# Patient Record
Sex: FEMALE | Race: WHITE | NOT HISPANIC OR LATINO
[De-identification: names, ages, dates, MRNs, and addresses within clinical notes are randomized per-mention and may not be internally consistent; named-entity substitution may affect disease eponyms.]

---

## 2017-03-08 ENCOUNTER — APPOINTMENT (OUTPATIENT)
Dept: INTERNAL MEDICINE | Facility: CLINIC | Age: 82
End: 2017-03-08

## 2017-03-08 VITALS
HEART RATE: 65 BPM | BODY MASS INDEX: 29.41 KG/M2 | HEIGHT: 63 IN | WEIGHT: 166 LBS | SYSTOLIC BLOOD PRESSURE: 120 MMHG | DIASTOLIC BLOOD PRESSURE: 80 MMHG

## 2017-03-09 ENCOUNTER — RESULT REVIEW (OUTPATIENT)
Age: 82
End: 2017-03-09

## 2017-03-09 LAB
ALBUMIN SERPL ELPH-MCNC: 4.1 G/DL
ALP BLD-CCNC: 65 U/L
ALT SERPL-CCNC: 12 U/L
ANION GAP SERPL CALC-SCNC: 13 MMOL/L
AST SERPL-CCNC: 16 U/L
BASOPHILS # BLD AUTO: 0.05 K/UL
BASOPHILS NFR BLD AUTO: 0.7 %
BILIRUB SERPL-MCNC: 0.2 MG/DL
BUN SERPL-MCNC: 16 MG/DL
CALCIUM SERPL-MCNC: 9.8 MG/DL
CHLORIDE SERPL-SCNC: 101 MMOL/L
CHOLEST SERPL-MCNC: 195 MG/DL
CHOLEST/HDLC SERPL: 2.6 RATIO
CO2 SERPL-SCNC: 22 MMOL/L
CREAT SERPL-MCNC: 0.64 MG/DL
EOSINOPHIL # BLD AUTO: 0.09 K/UL
EOSINOPHIL NFR BLD AUTO: 1.2 %
ERYTHROCYTE [SEDIMENTATION RATE] IN BLOOD BY WESTERGREN METHOD: 15 MM/HR
GLUCOSE SERPL-MCNC: 96 MG/DL
HBA1C MFR BLD HPLC: 5.6 %
HCT VFR BLD CALC: 41.3 %
HDLC SERPL-MCNC: 75 MG/DL
HGB BLD-MCNC: 14 G/DL
IMM GRANULOCYTES NFR BLD AUTO: 0.9 %
LDLC SERPL CALC-MCNC: 103 MG/DL
LYMPHOCYTES # BLD AUTO: 1.89 K/UL
LYMPHOCYTES NFR BLD AUTO: 25.3 %
MAN DIFF?: NORMAL
MCHC RBC-ENTMCNC: 30.2 PG
MCHC RBC-ENTMCNC: 33.9 GM/DL
MCV RBC AUTO: 89 FL
MONOCYTES # BLD AUTO: 0.54 K/UL
MONOCYTES NFR BLD AUTO: 7.2 %
NEUTROPHILS # BLD AUTO: 4.83 K/UL
NEUTROPHILS NFR BLD AUTO: 64.7 %
PLATELET # BLD AUTO: 346 K/UL
POTASSIUM SERPL-SCNC: 4.7 MMOL/L
PROT SERPL-MCNC: 7 G/DL
RBC # BLD: 4.64 M/UL
RBC # FLD: 13.6 %
SODIUM SERPL-SCNC: 136 MMOL/L
TRIGL SERPL-MCNC: 85 MG/DL
URATE SERPL-MCNC: 4 MG/DL
WBC # FLD AUTO: 7.47 K/UL

## 2017-04-11 ENCOUNTER — RX RENEWAL (OUTPATIENT)
Age: 82
End: 2017-04-11

## 2017-05-18 ENCOUNTER — RX RENEWAL (OUTPATIENT)
Age: 82
End: 2017-05-18

## 2017-07-21 ENCOUNTER — APPOINTMENT (OUTPATIENT)
Dept: INTERNAL MEDICINE | Facility: CLINIC | Age: 82
End: 2017-07-21

## 2017-07-21 ENCOUNTER — OUTPATIENT (OUTPATIENT)
Dept: OUTPATIENT SERVICES | Facility: HOSPITAL | Age: 82
LOS: 1 days | End: 2017-07-21
Payer: MEDICARE

## 2017-07-21 ENCOUNTER — NON-APPOINTMENT (OUTPATIENT)
Age: 82
End: 2017-07-21

## 2017-07-21 VITALS
HEIGHT: 63 IN | SYSTOLIC BLOOD PRESSURE: 126 MMHG | BODY MASS INDEX: 29.59 KG/M2 | DIASTOLIC BLOOD PRESSURE: 78 MMHG | WEIGHT: 167 LBS | HEART RATE: 68 BPM | RESPIRATION RATE: 14 BRPM

## 2017-07-21 DIAGNOSIS — Z01.818 ENCOUNTER FOR OTHER PREPROCEDURAL EXAMINATION: ICD-10-CM

## 2017-07-21 DIAGNOSIS — C44.90 UNSPECIFIED MALIGNANT NEOPLASM OF SKIN, UNSPECIFIED: ICD-10-CM

## 2017-07-21 PROCEDURE — G0463: CPT

## 2017-07-21 PROCEDURE — 71046 X-RAY EXAM CHEST 2 VIEWS: CPT

## 2017-07-21 PROCEDURE — 71020: CPT | Mod: 26

## 2017-07-23 LAB
ALBUMIN SERPL ELPH-MCNC: 4.3 G/DL
ALP BLD-CCNC: 64 U/L
ALT SERPL-CCNC: 18 U/L
ANION GAP SERPL CALC-SCNC: 15 MMOL/L
APTT BLD: 27.7 SEC
AST SERPL-CCNC: 16 U/L
BASOPHILS # BLD AUTO: 0.02 K/UL
BASOPHILS NFR BLD AUTO: 0.4 %
BILIRUB SERPL-MCNC: 0.3 MG/DL
BUN SERPL-MCNC: 20 MG/DL
CALCIUM SERPL-MCNC: 9.3 MG/DL
CHLORIDE SERPL-SCNC: 100 MMOL/L
CHOLEST SERPL-MCNC: 203 MG/DL
CHOLEST/HDLC SERPL: 2.8 RATIO
CO2 SERPL-SCNC: 24 MMOL/L
CREAT SERPL-MCNC: 0.63 MG/DL
EOSINOPHIL # BLD AUTO: 0.09 K/UL
EOSINOPHIL NFR BLD AUTO: 1.9 %
GLUCOSE SERPL-MCNC: 97 MG/DL
HBA1C MFR BLD HPLC: 5.5 %
HCT VFR BLD CALC: 41.9 %
HDLC SERPL-MCNC: 72 MG/DL
HGB BLD-MCNC: 13.7 G/DL
IMM GRANULOCYTES NFR BLD AUTO: 0.6 %
INR PPP: 0.85 RATIO
LDLC SERPL CALC-MCNC: 112 MG/DL
LYMPHOCYTES # BLD AUTO: 1.33 K/UL
LYMPHOCYTES NFR BLD AUTO: 27.5 %
MAN DIFF?: NORMAL
MCHC RBC-ENTMCNC: 29.8 PG
MCHC RBC-ENTMCNC: 32.7 GM/DL
MCV RBC AUTO: 91.1 FL
MONOCYTES # BLD AUTO: 0.3 K/UL
MONOCYTES NFR BLD AUTO: 6.2 %
NEUTROPHILS # BLD AUTO: 3.06 K/UL
NEUTROPHILS NFR BLD AUTO: 63.4 %
PLATELET # BLD AUTO: 308 K/UL
POTASSIUM SERPL-SCNC: 5 MMOL/L
PROT SERPL-MCNC: 7 G/DL
PT BLD: 9.6 SEC
RBC # BLD: 4.6 M/UL
RBC # FLD: 13.9 %
SODIUM SERPL-SCNC: 139 MMOL/L
TRIGL SERPL-MCNC: 93 MG/DL
WBC # FLD AUTO: 4.83 K/UL

## 2017-07-24 ENCOUNTER — APPOINTMENT (OUTPATIENT)
Dept: PULMONOLOGY | Facility: CLINIC | Age: 82
End: 2017-07-24

## 2017-07-24 VITALS
RESPIRATION RATE: 14 BRPM | SYSTOLIC BLOOD PRESSURE: 116 MMHG | DIASTOLIC BLOOD PRESSURE: 80 MMHG | OXYGEN SATURATION: 96 % | HEIGHT: 63 IN | HEART RATE: 66 BPM | BODY MASS INDEX: 29.58 KG/M2

## 2017-07-24 VITALS — WEIGHT: 167 LBS | BODY MASS INDEX: 29.58 KG/M2

## 2017-07-26 RX ORDER — ALBUTEROL SULFATE 90 UG/1
108 (90 BASE) AEROSOL, METERED RESPIRATORY (INHALATION)
Qty: 3 | Refills: 1 | Status: DISCONTINUED | COMMUNITY
Start: 2017-07-24 | End: 2017-07-26

## 2017-10-10 ENCOUNTER — LABORATORY RESULT (OUTPATIENT)
Age: 82
End: 2017-10-10

## 2017-10-10 ENCOUNTER — APPOINTMENT (OUTPATIENT)
Dept: INTERNAL MEDICINE | Facility: CLINIC | Age: 82
End: 2017-10-10
Payer: MEDICARE

## 2017-10-10 VITALS — DIASTOLIC BLOOD PRESSURE: 80 MMHG | HEART RATE: 69 BPM | SYSTOLIC BLOOD PRESSURE: 130 MMHG | TEMPERATURE: 98.7 F

## 2017-10-10 DIAGNOSIS — Z01.818 ENCOUNTER FOR OTHER PREPROCEDURAL EXAMINATION: ICD-10-CM

## 2017-10-10 PROCEDURE — 99214 OFFICE O/P EST MOD 30 MIN: CPT | Mod: 25

## 2017-10-10 PROCEDURE — 36415 COLL VENOUS BLD VENIPUNCTURE: CPT

## 2017-10-11 ENCOUNTER — RESULT REVIEW (OUTPATIENT)
Age: 82
End: 2017-10-11

## 2017-10-11 LAB
ALBUMIN SERPL ELPH-MCNC: 3.9 G/DL
ALP BLD-CCNC: 79 U/L
ALT SERPL-CCNC: 24 U/L
ANION GAP SERPL CALC-SCNC: 12 MMOL/L
APPEARANCE: CLEAR
AST SERPL-CCNC: 19 U/L
BACTERIA: NEGATIVE
BASOPHILS # BLD AUTO: 0.03 K/UL
BASOPHILS NFR BLD AUTO: 0.5 %
BILIRUB SERPL-MCNC: <0.2 MG/DL
BILIRUBIN URINE: NEGATIVE
BLOOD URINE: NEGATIVE
BUN SERPL-MCNC: 15 MG/DL
CALCIUM SERPL-MCNC: 9.8 MG/DL
CHLORIDE SERPL-SCNC: 101 MMOL/L
CO2 SERPL-SCNC: 24 MMOL/L
COLOR: YELLOW
CREAT SERPL-MCNC: 0.76 MG/DL
EOSINOPHIL # BLD AUTO: 0.08 K/UL
EOSINOPHIL NFR BLD AUTO: 1.2 %
GLUCOSE QUALITATIVE U: NEGATIVE MG/DL
GLUCOSE SERPL-MCNC: 92 MG/DL
HCT VFR BLD CALC: 38 %
HGB BLD-MCNC: 12.6 G/DL
HYALINE CASTS: 0 /LPF
IMM GRANULOCYTES NFR BLD AUTO: 0.5 %
KETONES URINE: NEGATIVE
LEUKOCYTE ESTERASE URINE: NEGATIVE
LYMPHOCYTES # BLD AUTO: 1.35 K/UL
LYMPHOCYTES NFR BLD AUTO: 21 %
MAN DIFF?: NORMAL
MCHC RBC-ENTMCNC: 29.6 PG
MCHC RBC-ENTMCNC: 33.2 GM/DL
MCV RBC AUTO: 89.2 FL
MICROSCOPIC-UA: NORMAL
MONOCYTES # BLD AUTO: 0.43 K/UL
MONOCYTES NFR BLD AUTO: 6.7 %
NEUTROPHILS # BLD AUTO: 4.51 K/UL
NEUTROPHILS NFR BLD AUTO: 70.1 %
NITRITE URINE: NEGATIVE
PH URINE: 7
PLATELET # BLD AUTO: 349 K/UL
POTASSIUM SERPL-SCNC: 4.6 MMOL/L
PROT SERPL-MCNC: 6.8 G/DL
PROTEIN URINE: NEGATIVE MG/DL
RBC # BLD: 4.26 M/UL
RBC # FLD: 13.3 %
RED BLOOD CELLS URINE: 1 /HPF
SODIUM SERPL-SCNC: 137 MMOL/L
SPECIFIC GRAVITY URINE: 1.02
SQUAMOUS EPITHELIAL CELLS: 0 /HPF
UROBILINOGEN URINE: NEGATIVE MG/DL
WBC # FLD AUTO: 6.43 K/UL
WHITE BLOOD CELLS URINE: 0 /HPF

## 2017-10-12 LAB — BACTERIA UR CULT: NORMAL

## 2017-10-27 ENCOUNTER — APPOINTMENT (OUTPATIENT)
Dept: INTERNAL MEDICINE | Facility: CLINIC | Age: 82
End: 2017-10-27
Payer: MEDICARE

## 2017-10-27 VITALS
OXYGEN SATURATION: 96 % | HEART RATE: 60 BPM | HEIGHT: 63 IN | BODY MASS INDEX: 30.48 KG/M2 | SYSTOLIC BLOOD PRESSURE: 125 MMHG | WEIGHT: 172 LBS | DIASTOLIC BLOOD PRESSURE: 82 MMHG

## 2017-10-27 PROCEDURE — 99214 OFFICE O/P EST MOD 30 MIN: CPT | Mod: 25

## 2017-10-27 PROCEDURE — 36415 COLL VENOUS BLD VENIPUNCTURE: CPT

## 2017-10-30 ENCOUNTER — RESULT REVIEW (OUTPATIENT)
Age: 82
End: 2017-10-30

## 2017-10-30 LAB
ALBUMIN SERPL ELPH-MCNC: 4.2 G/DL
ALP BLD-CCNC: 67 U/L
ALT SERPL-CCNC: 18 U/L
ANION GAP SERPL CALC-SCNC: 14 MMOL/L
AST SERPL-CCNC: 15 U/L
BASOPHILS # BLD AUTO: 0.03 K/UL
BASOPHILS NFR BLD AUTO: 0.5 %
BILIRUB SERPL-MCNC: 0.3 MG/DL
BUN SERPL-MCNC: 21 MG/DL
CALCIUM SERPL-MCNC: 9.6 MG/DL
CHLORIDE SERPL-SCNC: 98 MMOL/L
CHOLEST SERPL-MCNC: 195 MG/DL
CHOLEST/HDLC SERPL: 3.1 RATIO
CO2 SERPL-SCNC: 26 MMOL/L
CREAT SERPL-MCNC: 0.64 MG/DL
EOSINOPHIL # BLD AUTO: 0.17 K/UL
EOSINOPHIL NFR BLD AUTO: 3.1 %
GLUCOSE SERPL-MCNC: 102 MG/DL
HBA1C MFR BLD HPLC: 5.5 %
HCT VFR BLD CALC: 41.1 %
HDLC SERPL-MCNC: 62 MG/DL
HGB BLD-MCNC: 13.7 G/DL
IMM GRANULOCYTES NFR BLD AUTO: 0.7 %
LDLC SERPL CALC-MCNC: 111 MG/DL
LYMPHOCYTES # BLD AUTO: 1.51 K/UL
LYMPHOCYTES NFR BLD AUTO: 27.5 %
MAN DIFF?: NORMAL
MCHC RBC-ENTMCNC: 30.2 PG
MCHC RBC-ENTMCNC: 33.3 GM/DL
MCV RBC AUTO: 90.5 FL
MONOCYTES # BLD AUTO: 0.49 K/UL
MONOCYTES NFR BLD AUTO: 8.9 %
NEUTROPHILS # BLD AUTO: 3.26 K/UL
NEUTROPHILS NFR BLD AUTO: 59.3 %
PLATELET # BLD AUTO: 327 K/UL
POTASSIUM SERPL-SCNC: 4.6 MMOL/L
PROT SERPL-MCNC: 7 G/DL
RBC # BLD: 4.54 M/UL
RBC # FLD: 13.3 %
SODIUM SERPL-SCNC: 138 MMOL/L
TRIGL SERPL-MCNC: 108 MG/DL
WBC # FLD AUTO: 5.5 K/UL

## 2017-11-06 ENCOUNTER — APPOINTMENT (OUTPATIENT)
Dept: DERMATOLOGY | Facility: CLINIC | Age: 82
End: 2017-11-06
Payer: MEDICARE

## 2017-11-06 PROCEDURE — 99213 OFFICE O/P EST LOW 20 MIN: CPT

## 2018-01-18 ENCOUNTER — APPOINTMENT (OUTPATIENT)
Dept: INTERNAL MEDICINE | Facility: CLINIC | Age: 83
End: 2018-01-18
Payer: MEDICARE

## 2018-01-18 VITALS
HEART RATE: 80 BPM | OXYGEN SATURATION: 97 % | DIASTOLIC BLOOD PRESSURE: 80 MMHG | BODY MASS INDEX: 30.83 KG/M2 | SYSTOLIC BLOOD PRESSURE: 125 MMHG | WEIGHT: 174 LBS | HEIGHT: 63 IN

## 2018-01-18 PROCEDURE — 99214 OFFICE O/P EST MOD 30 MIN: CPT | Mod: 25

## 2018-01-18 PROCEDURE — 36415 COLL VENOUS BLD VENIPUNCTURE: CPT

## 2018-01-19 ENCOUNTER — RESULT REVIEW (OUTPATIENT)
Age: 83
End: 2018-01-19

## 2018-01-19 LAB
ALBUMIN SERPL ELPH-MCNC: 4.2 G/DL
ALP BLD-CCNC: 71 U/L
ALT SERPL-CCNC: 19 U/L
ANION GAP SERPL CALC-SCNC: 16 MMOL/L
AST SERPL-CCNC: 17 U/L
BASOPHILS # BLD AUTO: 0.06 K/UL
BASOPHILS NFR BLD AUTO: 1 %
BILIRUB SERPL-MCNC: 0.3 MG/DL
BUN SERPL-MCNC: 18 MG/DL
CALCIUM SERPL-MCNC: 9.3 MG/DL
CHLORIDE SERPL-SCNC: 95 MMOL/L
CHOLEST SERPL-MCNC: 191 MG/DL
CHOLEST/HDLC SERPL: 3 RATIO
CO2 SERPL-SCNC: 23 MMOL/L
CREAT SERPL-MCNC: 0.74 MG/DL
EOSINOPHIL # BLD AUTO: 0.09 K/UL
EOSINOPHIL NFR BLD AUTO: 1.5 %
GLUCOSE SERPL-MCNC: 97 MG/DL
HBA1C MFR BLD HPLC: 5.6 %
HCT VFR BLD CALC: 42.3 %
HDLC SERPL-MCNC: 63 MG/DL
HGB BLD-MCNC: 13.9 G/DL
IMM GRANULOCYTES NFR BLD AUTO: 0.5 %
LDLC SERPL CALC-MCNC: 102 MG/DL
LYMPHOCYTES # BLD AUTO: 1.48 K/UL
LYMPHOCYTES NFR BLD AUTO: 24.1 %
MAN DIFF?: NORMAL
MCHC RBC-ENTMCNC: 29.3 PG
MCHC RBC-ENTMCNC: 32.9 GM/DL
MCV RBC AUTO: 89.1 FL
MONOCYTES # BLD AUTO: 0.51 K/UL
MONOCYTES NFR BLD AUTO: 8.3 %
NEUTROPHILS # BLD AUTO: 3.97 K/UL
NEUTROPHILS NFR BLD AUTO: 64.6 %
PLATELET # BLD AUTO: 335 K/UL
POTASSIUM SERPL-SCNC: 4.5 MMOL/L
PROT SERPL-MCNC: 7.1 G/DL
RBC # BLD: 4.75 M/UL
RBC # FLD: 13.8 %
SODIUM SERPL-SCNC: 134 MMOL/L
TRIGL SERPL-MCNC: 129 MG/DL
WBC # FLD AUTO: 6.14 K/UL

## 2018-04-18 ENCOUNTER — APPOINTMENT (OUTPATIENT)
Dept: INTERNAL MEDICINE | Facility: CLINIC | Age: 83
End: 2018-04-18
Payer: MEDICARE

## 2018-04-18 VITALS
OXYGEN SATURATION: 97 % | HEART RATE: 62 BPM | HEIGHT: 63 IN | WEIGHT: 170 LBS | DIASTOLIC BLOOD PRESSURE: 80 MMHG | BODY MASS INDEX: 30.12 KG/M2 | SYSTOLIC BLOOD PRESSURE: 130 MMHG

## 2018-04-18 PROCEDURE — 36415 COLL VENOUS BLD VENIPUNCTURE: CPT

## 2018-04-18 PROCEDURE — 99214 OFFICE O/P EST MOD 30 MIN: CPT | Mod: 25

## 2018-04-19 LAB
ALBUMIN SERPL ELPH-MCNC: 4.2 G/DL
ALP BLD-CCNC: 76 U/L
ALT SERPL-CCNC: 18 U/L
ANION GAP SERPL CALC-SCNC: 16 MMOL/L
AST SERPL-CCNC: 16 U/L
BASOPHILS # BLD AUTO: 0.07 K/UL
BASOPHILS NFR BLD AUTO: 1.5 %
BILIRUB SERPL-MCNC: 0.3 MG/DL
BUN SERPL-MCNC: 21 MG/DL
CALCIUM SERPL-MCNC: 9.6 MG/DL
CHLORIDE SERPL-SCNC: 101 MMOL/L
CHOLEST SERPL-MCNC: 193 MG/DL
CHOLEST/HDLC SERPL: 3 RATIO
CO2 SERPL-SCNC: 22 MMOL/L
CREAT SERPL-MCNC: 0.68 MG/DL
EOSINOPHIL # BLD AUTO: 0.17 K/UL
EOSINOPHIL NFR BLD AUTO: 3.6 %
GLUCOSE SERPL-MCNC: 95 MG/DL
HBA1C MFR BLD HPLC: 5.5 %
HCT VFR BLD CALC: 40.6 %
HCV AB SER QL: NONREACTIVE
HCV S/CO RATIO: 0.19 S/CO
HDLC SERPL-MCNC: 65 MG/DL
HGB BLD-MCNC: 13.3 G/DL
IMM GRANULOCYTES NFR BLD AUTO: 0.4 %
LDLC SERPL CALC-MCNC: 110 MG/DL
LYMPHOCYTES # BLD AUTO: 1.54 K/UL
LYMPHOCYTES NFR BLD AUTO: 32.7 %
MAN DIFF?: NORMAL
MCHC RBC-ENTMCNC: 29.2 PG
MCHC RBC-ENTMCNC: 32.8 GM/DL
MCV RBC AUTO: 89.2 FL
MONOCYTES # BLD AUTO: 0.33 K/UL
MONOCYTES NFR BLD AUTO: 7 %
NEUTROPHILS # BLD AUTO: 2.58 K/UL
NEUTROPHILS NFR BLD AUTO: 54.8 %
PLATELET # BLD AUTO: 316 K/UL
POTASSIUM SERPL-SCNC: 4.6 MMOL/L
PROT SERPL-MCNC: 6.8 G/DL
RBC # BLD: 4.55 M/UL
RBC # FLD: 14.4 %
SODIUM SERPL-SCNC: 139 MMOL/L
TRIGL SERPL-MCNC: 88 MG/DL
WBC # FLD AUTO: 4.71 K/UL

## 2018-04-20 ENCOUNTER — RESULT REVIEW (OUTPATIENT)
Age: 83
End: 2018-04-20

## 2018-05-03 ENCOUNTER — RESULT REVIEW (OUTPATIENT)
Age: 83
End: 2018-05-03

## 2018-05-03 DIAGNOSIS — R92.8 OTHER ABNORMAL AND INCONCLUSIVE FINDINGS ON DIAGNOSTIC IMAGING OF BREAST: ICD-10-CM

## 2018-06-08 ENCOUNTER — RX RENEWAL (OUTPATIENT)
Age: 83
End: 2018-06-08

## 2018-07-12 ENCOUNTER — RESULT REVIEW (OUTPATIENT)
Age: 83
End: 2018-07-12

## 2018-07-25 ENCOUNTER — APPOINTMENT (OUTPATIENT)
Dept: PULMONOLOGY | Facility: CLINIC | Age: 83
End: 2018-07-25
Payer: MEDICARE

## 2018-07-25 VITALS
HEIGHT: 63 IN | DIASTOLIC BLOOD PRESSURE: 78 MMHG | SYSTOLIC BLOOD PRESSURE: 140 MMHG | HEART RATE: 68 BPM | BODY MASS INDEX: 29.95 KG/M2 | OXYGEN SATURATION: 95 % | WEIGHT: 169 LBS

## 2018-07-25 PROCEDURE — 94727 GAS DIL/WSHOT DETER LNG VOL: CPT

## 2018-07-25 PROCEDURE — 85018 HEMOGLOBIN: CPT | Mod: QW

## 2018-07-25 PROCEDURE — 99214 OFFICE O/P EST MOD 30 MIN: CPT | Mod: 25

## 2018-07-25 PROCEDURE — 94010 BREATHING CAPACITY TEST: CPT

## 2018-07-25 PROCEDURE — 94729 DIFFUSING CAPACITY: CPT

## 2018-08-02 ENCOUNTER — MEDICATION RENEWAL (OUTPATIENT)
Age: 83
End: 2018-08-02

## 2018-09-04 ENCOUNTER — APPOINTMENT (OUTPATIENT)
Dept: INTERNAL MEDICINE | Facility: CLINIC | Age: 83
End: 2018-09-04
Payer: MEDICARE

## 2018-09-04 VITALS
BODY MASS INDEX: 29.95 KG/M2 | DIASTOLIC BLOOD PRESSURE: 70 MMHG | HEART RATE: 65 BPM | SYSTOLIC BLOOD PRESSURE: 123 MMHG | HEIGHT: 63 IN | WEIGHT: 169 LBS | RESPIRATION RATE: 14 BRPM

## 2018-09-04 DIAGNOSIS — D25.9 LEIOMYOMA OF UTERUS, UNSPECIFIED: ICD-10-CM

## 2018-09-04 DIAGNOSIS — Z11.59 ENCOUNTER FOR SCREENING FOR OTHER VIRAL DISEASES: ICD-10-CM

## 2018-09-04 LAB — CYTOLOGY CVX/VAG DOC THIN PREP: NORMAL

## 2018-09-04 PROCEDURE — 36415 COLL VENOUS BLD VENIPUNCTURE: CPT

## 2018-09-04 PROCEDURE — G0439: CPT

## 2018-09-04 NOTE — HISTORY OF PRESENT ILLNESS
[FreeTextEntry1] : 82-year-old female with history of asthma, mild, nonobstructive CAD, hypertension, hyperlipidemia and osteopenia presents for her yearly physical.\par \par The patient is doing very well without  specific complaints including no chest pain, palpitations, shortness of breath or edema.\par Her asthma is well-controlled and has no use for rescue inhalers.\par The patient does complain of some dyspnea on exertion, and she feels is her asthma, but recently had a pulmonary evaluation who felt her asthma was under good control.\par The patient has a cardiology evaluation next week\par \par The patient was very active with regular walking and frequent traveling, but not as much, so with a chronic left knee issue and her dyspnea on exertion\par \par Patient's only issue is 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 The patient had a left breast biopsy, which was benign. July 2018.\par

## 2018-09-04 NOTE — HEALTH RISK ASSESSMENT
[Good] : ~his/her~  mood as  good [No falls in past year] : Patient reported no falls in the past year [0] : 2) Feeling down, depressed, or hopeless: Not at all (0) [None] : None [Alone] : lives alone [Retired] : retired [] :  [Sexually Active] : sexually active [Feels Safe at Home] : Feels safe at home [Fully functional (bathing, dressing, toileting, transferring, walking, feeding)] : Fully functional (bathing, dressing, toileting, transferring, walking, feeding) [Fully functional (using the telephone, shopping, preparing meals, housekeeping, doing laundry, using] : Fully functional and needs no help or supervision to perform IADLs (using the telephone, shopping, preparing meals, housekeeping, doing laundry, using transportation, managing medications and managing finances) [Smoke Detector] : smoke detector [Carbon Monoxide Detector] : carbon monoxide detector [Seat Belt] :  uses seat belt [Sunscreen] : uses sunscreen [Discussed at today's visit] : Advance Directives Discussed at today's visit [College] : College [# Of Children ___] : has [unfilled] children [Designated Healthcare Proxy] : Designated healthcare proxy [Name: ___] : Health Care Proxy's Name: [unfilled]  [] : No [de-identified] : Occasional [Change in mental status noted] : No change in mental status noted [Reports changes in hearing] : Reports no changes in hearing [Reports changes in vision] : Reports no changes in vision [Reports changes in dental health] : Reports no changes in dental health [Guns at Home] : no guns at home [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

## 2018-09-04 NOTE — PHYSICAL EXAM
[General Appearance - Alert] : alert [General Appearance - In No Acute Distress] : in no acute distress [Sclera] : the sclera and conjunctiva were normal [PERRL With Normal Accommodation] : pupils were equal in size, round, and reactive to light [Extraocular Movements] : extraocular movements were intact [Outer Ear] : the ears and nose were normal in appearance [Oropharynx] : the oropharynx was normal [Neck Appearance] : the appearance of the neck was normal [Neck Cervical Mass (___cm)] : no neck mass was observed [Jugular Venous Distention Increased] : there was no jugular-venous distention [Thyroid Diffuse Enlargement] : the thyroid was not enlarged [Thyroid Nodule] : there were no palpable thyroid nodules [Auscultation Breath Sounds / Voice Sounds] : lungs were clear to auscultation bilaterally [Heart Rate And Rhythm] : heart rate was normal and rhythm regular [Heart Sounds] : normal S1 and S2 [Heart Sounds Gallop] : no gallops [Murmurs] : no murmurs [Heart Sounds Pericardial Friction Rub] : no pericardial rub [Full Pulse] : the pedal pulses are present [Edema] : there was no peripheral edema [Bowel Sounds] : normal bowel sounds [Abdomen Soft] : soft [Abdomen Tenderness] : non-tender [] : no hepato-splenomegaly [Abdomen Mass (___ Cm)] : no abdominal mass palpated [Cervical Lymph Nodes Enlarged Posterior Bilaterally] : posterior cervical [Cervical Lymph Nodes Enlarged Anterior Bilaterally] : anterior cervical [Supraclavicular Lymph Nodes Enlarged Bilaterally] : supraclavicular [Axillary Lymph Nodes Enlarged Bilaterally] : axillary [Femoral Lymph Nodes Enlarged Bilaterally] : femoral [Inguinal Lymph Nodes Enlarged Bilaterally] : inguinal [No CVA Tenderness] : no ~M costovertebral angle tenderness [No Spinal Tenderness] : no spinal tenderness [Abnormal Walk] : normal gait [Nail Clubbing] : no clubbing  or cyanosis of the fingernails [Musculoskeletal - Swelling] : no joint swelling seen [Motor Tone] : muscle strength and tone were normal [Cranial Nerves] : cranial nerves 2-12 were intact [No Focal Deficits] : no focal deficits [Oriented To Time, Place, And Person] : oriented to person, place, and time [Impaired Insight] : insight and judgment were intact [Affect] : the affect was normal [FreeTextEntry1] : multiple nevi. follows with dermatology

## 2018-09-04 NOTE — REVIEW OF SYSTEMS
[Negative] : Heme/Lymph [Dyspnea on Exertion] : dyspnea on exertion [SOB on Exertion] : shortness of breath during exertion [As Noted in HPI] : as noted in HPI [Joint Pain] : joint pain

## 2018-09-04 NOTE — ASSESSMENT
[FreeTextEntry1] : 82-year-old female who is quite active and relatively stable with controlled hypertension, asymptomatic mild coronary artery disease and mild mitral regurgitation.\par \par  Asthma is controlled with her Advair and Singulair and her GERD is controlled without meds.. Patient is to continue present treatment.\par Patient with dyspnea on exertion with good pulmonary evaluation therefore, to followup with cardiology.\par \par Patient with asymptomatic unifocal PVCs with recent cardiac evaluation essentially negative.\par \par Cardiology followup \par Mammography May 2018\par Bone density May 2017\par Colonoscopy 2009 without followup needed unless any issue\par GYN \par \par Followup in 3 months and as needed\par \par

## 2018-09-05 ENCOUNTER — RESULT REVIEW (OUTPATIENT)
Age: 83
End: 2018-09-05

## 2018-09-05 LAB
ALBUMIN SERPL ELPH-MCNC: 4.5 G/DL
ALP BLD-CCNC: 77 U/L
ALT SERPL-CCNC: 15 U/L
ANION GAP SERPL CALC-SCNC: 14 MMOL/L
APPEARANCE: CLEAR
AST SERPL-CCNC: 18 U/L
BACTERIA: NEGATIVE
BASOPHILS # BLD AUTO: 0.07 K/UL
BASOPHILS NFR BLD AUTO: 1.3 %
BILIRUB SERPL-MCNC: 0.2 MG/DL
BILIRUBIN URINE: NEGATIVE
BLOOD URINE: NEGATIVE
BUN SERPL-MCNC: 17 MG/DL
CALCIUM SERPL-MCNC: 9.6 MG/DL
CANCER AG125 SERPL-ACNC: 13 U/ML
CHLORIDE SERPL-SCNC: 100 MMOL/L
CHOLEST SERPL-MCNC: 181 MG/DL
CHOLEST/HDLC SERPL: 3.3 RATIO
CO2 SERPL-SCNC: 24 MMOL/L
COLOR: YELLOW
CREAT SERPL-MCNC: 0.64 MG/DL
EOSINOPHIL # BLD AUTO: 0.15 K/UL
EOSINOPHIL NFR BLD AUTO: 2.8 %
GLUCOSE QUALITATIVE U: NEGATIVE MG/DL
GLUCOSE SERPL-MCNC: 100 MG/DL
HBA1C MFR BLD HPLC: 5.7 %
HCT VFR BLD CALC: 42.6 %
HDLC SERPL-MCNC: 55 MG/DL
HGB BLD-MCNC: 13.8 G/DL
HYALINE CASTS: 3 /LPF
IMM GRANULOCYTES NFR BLD AUTO: 0.6 %
KETONES URINE: NEGATIVE
LDLC SERPL CALC-MCNC: 99 MG/DL
LEUKOCYTE ESTERASE URINE: NEGATIVE
LYMPHOCYTES # BLD AUTO: 1.52 K/UL
LYMPHOCYTES NFR BLD AUTO: 28 %
MAN DIFF?: NORMAL
MCHC RBC-ENTMCNC: 29.4 PG
MCHC RBC-ENTMCNC: 32.4 GM/DL
MCV RBC AUTO: 90.6 FL
MICROSCOPIC-UA: NORMAL
MONOCYTES # BLD AUTO: 0.36 K/UL
MONOCYTES NFR BLD AUTO: 6.6 %
NEUTROPHILS # BLD AUTO: 3.3 K/UL
NEUTROPHILS NFR BLD AUTO: 60.7 %
NITRITE URINE: NEGATIVE
PH URINE: 5.5
PLATELET # BLD AUTO: 351 K/UL
POTASSIUM SERPL-SCNC: 4.6 MMOL/L
PROT SERPL-MCNC: 6.9 G/DL
PROTEIN URINE: NEGATIVE MG/DL
RBC # BLD: 4.7 M/UL
RBC # FLD: 13.7 %
RED BLOOD CELLS URINE: 1 /HPF
SODIUM SERPL-SCNC: 138 MMOL/L
SPECIFIC GRAVITY URINE: 1.02
SQUAMOUS EPITHELIAL CELLS: 2 /HPF
TRIGL SERPL-MCNC: 134 MG/DL
UROBILINOGEN URINE: NEGATIVE MG/DL
WBC # FLD AUTO: 5.43 K/UL
WHITE BLOOD CELLS URINE: 1 /HPF

## 2018-10-03 ENCOUNTER — LABORATORY RESULT (OUTPATIENT)
Age: 83
End: 2018-10-03

## 2018-10-03 ENCOUNTER — APPOINTMENT (OUTPATIENT)
Dept: GASTROENTEROLOGY | Facility: CLINIC | Age: 83
End: 2018-10-03
Payer: MEDICARE

## 2018-10-03 VITALS
RESPIRATION RATE: 16 BRPM | WEIGHT: 169 LBS | HEIGHT: 63 IN | DIASTOLIC BLOOD PRESSURE: 78 MMHG | SYSTOLIC BLOOD PRESSURE: 128 MMHG | HEART RATE: 68 BPM | BODY MASS INDEX: 29.95 KG/M2

## 2018-10-03 PROCEDURE — 99204 OFFICE O/P NEW MOD 45 MIN: CPT

## 2018-10-04 LAB
ALBUMIN SERPL ELPH-MCNC: 4.3 G/DL
ALP BLD-CCNC: 68 U/L
ALT SERPL-CCNC: 14 U/L
ANION GAP SERPL CALC-SCNC: 14 MMOL/L
AST SERPL-CCNC: 15 U/L
BASOPHILS # BLD AUTO: 0.04 K/UL
BASOPHILS NFR BLD AUTO: 0.6 %
BILIRUB SERPL-MCNC: <0.2 MG/DL
BUN SERPL-MCNC: 18 MG/DL
CALCIUM SERPL-MCNC: 9.4 MG/DL
CHLORIDE SERPL-SCNC: 104 MMOL/L
CO2 SERPL-SCNC: 22 MMOL/L
CREAT SERPL-MCNC: 0.58 MG/DL
EOSINOPHIL # BLD AUTO: 0.12 K/UL
EOSINOPHIL NFR BLD AUTO: 1.7 %
GLUCOSE SERPL-MCNC: 100 MG/DL
HCT VFR BLD CALC: 39.6 %
HGB BLD-MCNC: 12.6 G/DL
IMM GRANULOCYTES NFR BLD AUTO: 0.4 %
LYMPHOCYTES # BLD AUTO: 1.65 K/UL
LYMPHOCYTES NFR BLD AUTO: 23.8 %
MAN DIFF?: NORMAL
MCHC RBC-ENTMCNC: 28.5 PG
MCHC RBC-ENTMCNC: 31.8 GM/DL
MCV RBC AUTO: 89.6 FL
MONOCYTES # BLD AUTO: 0.63 K/UL
MONOCYTES NFR BLD AUTO: 9.1 %
NEUTROPHILS # BLD AUTO: 4.45 K/UL
NEUTROPHILS NFR BLD AUTO: 64.4 %
PLATELET # BLD AUTO: 298 K/UL
POTASSIUM SERPL-SCNC: 4.6 MMOL/L
PROT SERPL-MCNC: 6.8 G/DL
RBC # BLD: 4.42 M/UL
RBC # FLD: 14 %
SODIUM SERPL-SCNC: 140 MMOL/L
WBC # FLD AUTO: 6.92 K/UL

## 2018-10-05 LAB
C DIFF TOX GENS STL QL NAA+PROBE: NORMAL
CDIFF BY PCR: NOT DETECTED
GI PCR PANEL, STOOL: NORMAL

## 2018-10-07 LAB — BACTERIA STL CULT: NORMAL

## 2018-10-09 LAB — DEPRECATED O AND P PREP STL: NORMAL

## 2018-10-31 ENCOUNTER — OUTPATIENT (OUTPATIENT)
Dept: OUTPATIENT SERVICES | Facility: HOSPITAL | Age: 83
LOS: 1 days | End: 2018-10-31
Payer: MEDICARE

## 2018-10-31 ENCOUNTER — APPOINTMENT (OUTPATIENT)
Dept: CT IMAGING | Facility: CLINIC | Age: 83
End: 2018-10-31
Payer: MEDICARE

## 2018-10-31 DIAGNOSIS — K52.9 NONINFECTIVE GASTROENTERITIS AND COLITIS, UNSPECIFIED: ICD-10-CM

## 2018-10-31 DIAGNOSIS — R10.9 UNSPECIFIED ABDOMINAL PAIN: ICD-10-CM

## 2018-10-31 PROCEDURE — 74177 CT ABD & PELVIS W/CONTRAST: CPT

## 2018-10-31 PROCEDURE — 74177 CT ABD & PELVIS W/CONTRAST: CPT | Mod: 26

## 2018-10-31 PROCEDURE — 82565 ASSAY OF CREATININE: CPT

## 2018-11-01 ENCOUNTER — RESULT REVIEW (OUTPATIENT)
Age: 83
End: 2018-11-01

## 2018-11-06 ENCOUNTER — RESULT REVIEW (OUTPATIENT)
Age: 83
End: 2018-11-06

## 2018-11-06 ENCOUNTER — APPOINTMENT (OUTPATIENT)
Dept: GASTROENTEROLOGY | Facility: GI CENTER | Age: 83
End: 2018-11-06
Payer: MEDICARE

## 2018-11-06 ENCOUNTER — OUTPATIENT (OUTPATIENT)
Dept: OUTPATIENT SERVICES | Facility: HOSPITAL | Age: 83
LOS: 1 days | End: 2018-11-06
Payer: MEDICARE

## 2018-11-06 VITALS
TEMPERATURE: 98.7 F | DIASTOLIC BLOOD PRESSURE: 85 MMHG | BODY MASS INDEX: 29.95 KG/M2 | OXYGEN SATURATION: 95 % | WEIGHT: 169 LBS | RESPIRATION RATE: 16 BRPM | HEART RATE: 62 BPM | HEIGHT: 63 IN | SYSTOLIC BLOOD PRESSURE: 152 MMHG

## 2018-11-06 DIAGNOSIS — K52.9 NONINFECTIVE GASTROENTERITIS AND COLITIS, UNSPECIFIED: ICD-10-CM

## 2018-11-06 DIAGNOSIS — R10.12 LEFT UPPER QUADRANT PAIN: ICD-10-CM

## 2018-11-06 DIAGNOSIS — K62.1 RECTAL POLYP: ICD-10-CM

## 2018-11-06 PROCEDURE — 45331 SIGMOIDOSCOPY AND BIOPSY: CPT

## 2018-11-06 PROCEDURE — 88305 TISSUE EXAM BY PATHOLOGIST: CPT | Mod: 26

## 2018-11-06 PROCEDURE — 45380 COLONOSCOPY AND BIOPSY: CPT

## 2018-11-08 LAB — SURGICAL PATHOLOGY FINAL REPORT - CH: SIGNIFICANT CHANGE UP

## 2018-11-19 ENCOUNTER — RESULT REVIEW (OUTPATIENT)
Age: 83
End: 2018-11-19

## 2018-11-19 ENCOUNTER — APPOINTMENT (OUTPATIENT)
Dept: DERMATOLOGY | Facility: CLINIC | Age: 83
End: 2018-11-19
Payer: MEDICARE

## 2018-11-19 PROCEDURE — 11100 BX SKIN SUBCUTANEOUS&/MUCOUS MEMBRANE 1 LESION: CPT

## 2018-11-19 PROCEDURE — 99214 OFFICE O/P EST MOD 30 MIN: CPT | Mod: 25

## 2018-12-03 ENCOUNTER — APPOINTMENT (OUTPATIENT)
Dept: INTERNAL MEDICINE | Facility: CLINIC | Age: 83
End: 2018-12-03
Payer: MEDICARE

## 2018-12-03 VITALS
SYSTOLIC BLOOD PRESSURE: 130 MMHG | OXYGEN SATURATION: 96 % | HEART RATE: 74 BPM | BODY MASS INDEX: 29.95 KG/M2 | WEIGHT: 169 LBS | DIASTOLIC BLOOD PRESSURE: 80 MMHG | HEIGHT: 63 IN

## 2018-12-03 PROCEDURE — 36415 COLL VENOUS BLD VENIPUNCTURE: CPT

## 2018-12-03 PROCEDURE — 99214 OFFICE O/P EST MOD 30 MIN: CPT | Mod: 25

## 2018-12-03 NOTE — ASSESSMENT
[FreeTextEntry1] : Labs will be sent out and further recommendations will be made based on lab results. Patient advised to continue present medications with diet/exercise and specialist followup. Patient will return to the office in 3-4 months\par \par \par Shingrix d/w pt\par Mammogram-4/2018\par bone density is up-to-date by GYN-to call for report=5/2017\par Colonoscopy was 2009, no need for follow up\par Sigmoidoscopy 11/2018\par Currently up to date with her specialists which include..\par 1. cardiology-Dr. Arana (stress test 10/2018)\par 2. pulmonary-Dr. Andrea\par 3. dermatology-Dr. Rosas\par 4. gynecology-Dr. Vaca\par 5. ophthalmology-\par 6..podiatry prn-Dr. Thomas\par 7. Orthopedic-Dr. Moreno\par 8.GI-Dr. Cobb\par HIV screening=declines\par hepatitis C screening=4/2018

## 2018-12-03 NOTE — HISTORY OF PRESENT ILLNESS
[FreeTextEntry1] : Pt presents for followup on hypertension/hyperlipidemia/coronary artery disease. Patient is currently fasting for today's labs/no complaints. Patient is currently on lisinopril/isradipine for her hypertension, on fish oil for her hyperlipidemia and metoprolol/ASA for coronary artery disease.\par -saw GI, new DX of Crohn's -now on budesonide\par -To have basal cell removed via Mohs procedure

## 2018-12-04 ENCOUNTER — RESULT REVIEW (OUTPATIENT)
Age: 83
End: 2018-12-04

## 2018-12-04 LAB
ALBUMIN SERPL ELPH-MCNC: 4 G/DL
ALP BLD-CCNC: 79 U/L
ALT SERPL-CCNC: 24 U/L
ANION GAP SERPL CALC-SCNC: 10 MMOL/L
AST SERPL-CCNC: 19 U/L
BASOPHILS # BLD AUTO: 0.03 K/UL
BASOPHILS NFR BLD AUTO: 0.4 %
BILIRUB SERPL-MCNC: 0.2 MG/DL
BUN SERPL-MCNC: 21 MG/DL
CALCIUM SERPL-MCNC: 9.3 MG/DL
CHLORIDE SERPL-SCNC: 103 MMOL/L
CHOLEST SERPL-MCNC: 181 MG/DL
CHOLEST/HDLC SERPL: 2.8 RATIO
CO2 SERPL-SCNC: 24 MMOL/L
CREAT SERPL-MCNC: 0.65 MG/DL
EOSINOPHIL # BLD AUTO: 0.13 K/UL
EOSINOPHIL NFR BLD AUTO: 1.9 %
GLUCOSE SERPL-MCNC: 104 MG/DL
HBA1C MFR BLD HPLC: 5.4 %
HCT VFR BLD CALC: 41.1 %
HDLC SERPL-MCNC: 64 MG/DL
HGB BLD-MCNC: 13.1 G/DL
IMM GRANULOCYTES NFR BLD AUTO: 0.7 %
LDLC SERPL CALC-MCNC: 102 MG/DL
LYMPHOCYTES # BLD AUTO: 1.62 K/UL
LYMPHOCYTES NFR BLD AUTO: 24.2 %
MAN DIFF?: NORMAL
MCHC RBC-ENTMCNC: 29.2 PG
MCHC RBC-ENTMCNC: 31.9 GM/DL
MCV RBC AUTO: 91.7 FL
MONOCYTES # BLD AUTO: 0.53 K/UL
MONOCYTES NFR BLD AUTO: 7.9 %
NEUTROPHILS # BLD AUTO: 4.34 K/UL
NEUTROPHILS NFR BLD AUTO: 64.9 %
PLATELET # BLD AUTO: 355 K/UL
POTASSIUM SERPL-SCNC: 4.6 MMOL/L
PROT SERPL-MCNC: 6.5 G/DL
RBC # BLD: 4.48 M/UL
RBC # FLD: 14.2 %
SODIUM SERPL-SCNC: 137 MMOL/L
TRIGL SERPL-MCNC: 77 MG/DL
WBC # FLD AUTO: 6.7 K/UL

## 2018-12-07 ENCOUNTER — RX RENEWAL (OUTPATIENT)
Age: 83
End: 2018-12-07

## 2018-12-20 ENCOUNTER — APPOINTMENT (OUTPATIENT)
Dept: GASTROENTEROLOGY | Facility: CLINIC | Age: 83
End: 2018-12-20
Payer: MEDICARE

## 2018-12-20 VITALS
RESPIRATION RATE: 16 BRPM | BODY MASS INDEX: 29.23 KG/M2 | OXYGEN SATURATION: 98 % | HEART RATE: 76 BPM | DIASTOLIC BLOOD PRESSURE: 78 MMHG | HEIGHT: 63 IN | SYSTOLIC BLOOD PRESSURE: 130 MMHG | WEIGHT: 165 LBS

## 2018-12-20 PROCEDURE — 99214 OFFICE O/P EST MOD 30 MIN: CPT

## 2018-12-20 RX ORDER — HYOSCYAMINE SULFATE 0.38 MG/1
0.38 TABLET, EXTENDED RELEASE ORAL
Qty: 60 | Refills: 1 | Status: DISCONTINUED | COMMUNITY
Start: 2018-10-03 | End: 2018-12-20

## 2019-03-18 ENCOUNTER — APPOINTMENT (OUTPATIENT)
Dept: INTERNAL MEDICINE | Facility: CLINIC | Age: 84
End: 2019-03-18
Payer: MEDICARE

## 2019-03-18 VITALS
WEIGHT: 161 LBS | RESPIRATION RATE: 14 BRPM | BODY MASS INDEX: 28.53 KG/M2 | DIASTOLIC BLOOD PRESSURE: 80 MMHG | SYSTOLIC BLOOD PRESSURE: 125 MMHG | HEIGHT: 63 IN | HEART RATE: 70 BPM

## 2019-03-18 PROCEDURE — 99214 OFFICE O/P EST MOD 30 MIN: CPT | Mod: 25

## 2019-03-18 PROCEDURE — 36415 COLL VENOUS BLD VENIPUNCTURE: CPT

## 2019-03-18 NOTE — PHYSICAL EXAM
[General Appearance - In No Acute Distress] : in no acute distress [] : no respiratory distress [Respiration, Rhythm And Depth] : normal respiratory rhythm and effort [Auscultation Breath Sounds / Voice Sounds] : lungs were clear to auscultation bilaterally [Heart Rate And Rhythm] : heart rate was normal and rhythm regular [Affect] : the affect was normal [Mood] : the mood was normal [de-identified] : Healed laceration left nares and abrasion bridge of nose

## 2019-03-18 NOTE — HISTORY OF PRESENT ILLNESS
[FreeTextEntry1] : 83-year-old female presents for followup with history of hypertension, hyperlipidemia and mild CAD.\par \par She also was recently diagnosed with colitis likely Crohn's and is being treated with budesonide with benefit. 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

## 2019-03-18 NOTE — ASSESSMENT
[FreeTextEntry1] : 83-year-old female currently medically stable with controlled hypertension and no evidence of cardiac decompensation.\par \par Asthma is controlled.\par \par Blood work done to assessment embolic status.\par \par Shingrix d/w pt\par Mammogram-4/2018\par bone density is up-to-date by GYN-to call for report=5/2017\par Colonoscopy was 2009, no need for follow up\par Sigmoidoscopy 11/2018\par Currently up to date with her specialists which include..\par 1. cardiology-Dr. Arana (stress test 10/2018)\par 2. pulmonary-Dr. Andrea\par 3. dermatology-Dr. Rosas\par 4. gynecology-Dr. Vaca\par 5. ophthalmology-\par 6..podiatry prn-Dr. Thomas\par 7. Orthopedic-Dr. Moreno\par 8.GI-Dr. Cobb\par HIV screening=declines\par hepatitis C screening=4/2018\par \par THE PATIENT IS CURRENTLY MEDICALLY STABLE WITHOUT CONTRAINDICATION TO SCHEDULED LOW RISK PROCEDURE

## 2019-03-20 ENCOUNTER — RESULT REVIEW (OUTPATIENT)
Age: 84
End: 2019-03-20

## 2019-03-20 LAB
ALBUMIN SERPL ELPH-MCNC: 4.4 G/DL
ALP BLD-CCNC: 83 U/L
ALT SERPL-CCNC: 18 U/L
ANION GAP SERPL CALC-SCNC: 16 MMOL/L
AST SERPL-CCNC: 22 U/L
BASOPHILS # BLD AUTO: 0.06 K/UL
BASOPHILS NFR BLD AUTO: 1 %
BILIRUB SERPL-MCNC: <0.2 MG/DL
BUN SERPL-MCNC: 23 MG/DL
CALCIUM SERPL-MCNC: 9.5 MG/DL
CHLORIDE SERPL-SCNC: 99 MMOL/L
CHOLEST SERPL-MCNC: 200 MG/DL
CHOLEST/HDLC SERPL: 3.1 RATIO
CO2 SERPL-SCNC: 21 MMOL/L
CREAT SERPL-MCNC: 0.74 MG/DL
EOSINOPHIL # BLD AUTO: 0.16 K/UL
EOSINOPHIL NFR BLD AUTO: 2.7 %
GLUCOSE SERPL-MCNC: 100 MG/DL
HBA1C MFR BLD HPLC: 5.6 %
HCT VFR BLD CALC: 44.2 %
HDLC SERPL-MCNC: 64 MG/DL
HGB BLD-MCNC: 13.8 G/DL
IMM GRANULOCYTES NFR BLD AUTO: 0.5 %
LDLC SERPL CALC-MCNC: 111 MG/DL
LYMPHOCYTES # BLD AUTO: 1.37 K/UL
LYMPHOCYTES NFR BLD AUTO: 23.3 %
MAN DIFF?: NORMAL
MCHC RBC-ENTMCNC: 29.4 PG
MCHC RBC-ENTMCNC: 31.2 GM/DL
MCV RBC AUTO: 94 FL
MONOCYTES # BLD AUTO: 0.43 K/UL
MONOCYTES NFR BLD AUTO: 7.3 %
NEUTROPHILS # BLD AUTO: 3.83 K/UL
NEUTROPHILS NFR BLD AUTO: 65.2 %
PLATELET # BLD AUTO: 375 K/UL
POTASSIUM SERPL-SCNC: 5 MMOL/L
PROT SERPL-MCNC: 7.1 G/DL
RBC # BLD: 4.7 M/UL
RBC # FLD: 14.3 %
SODIUM SERPL-SCNC: 136 MMOL/L
TRIGL SERPL-MCNC: 124 MG/DL
WBC # FLD AUTO: 5.88 K/UL

## 2019-04-01 ENCOUNTER — APPOINTMENT (OUTPATIENT)
Dept: GASTROENTEROLOGY | Facility: CLINIC | Age: 84
End: 2019-04-01
Payer: MEDICARE

## 2019-04-01 VITALS
SYSTOLIC BLOOD PRESSURE: 146 MMHG | BODY MASS INDEX: 29.23 KG/M2 | RESPIRATION RATE: 15 BRPM | OXYGEN SATURATION: 98 % | HEIGHT: 63 IN | HEART RATE: 67 BPM | DIASTOLIC BLOOD PRESSURE: 94 MMHG | WEIGHT: 165 LBS

## 2019-04-01 PROCEDURE — 99214 OFFICE O/P EST MOD 30 MIN: CPT

## 2019-04-01 NOTE — ASSESSMENT
[FreeTextEntry1] : I recommended to taper off Budesonide. If she has any recurrence of the symptoms, we will start budesonide again and start her on mesalamine. We will follow up in 2 months.

## 2019-04-01 NOTE — HISTORY OF PRESENT ILLNESS
[de-identified] : The patient arrived for followup. She underwent sigmoidoscopy for intractable abdominal discomfort and diarrhea.Stool workup including stool studies, small intestinal bacterial overgrowth test was negative. 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 She underwent flexible sigmoidoscopy without sedation and the biopsies were suggestive of colitis. She had diverticulosis and Rectal polyp. The rectal polyp was inflammatory. The patient was started on budesonide.She was started on 3 mg budesonide 3 times a day. Her symptoms immediately improved. Now she is on tapering dose of budesonide. She has low grade symptoms of abdominal bloating, gassiness today.

## 2019-04-15 ENCOUNTER — RX RENEWAL (OUTPATIENT)
Age: 84
End: 2019-04-15

## 2019-05-21 ENCOUNTER — MEDICATION RENEWAL (OUTPATIENT)
Age: 84
End: 2019-05-21

## 2019-05-24 ENCOUNTER — RX RENEWAL (OUTPATIENT)
Age: 84
End: 2019-05-24

## 2019-05-28 ENCOUNTER — APPOINTMENT (OUTPATIENT)
Dept: GASTROENTEROLOGY | Facility: CLINIC | Age: 84
End: 2019-05-28
Payer: MEDICARE

## 2019-05-28 VITALS
DIASTOLIC BLOOD PRESSURE: 100 MMHG | SYSTOLIC BLOOD PRESSURE: 150 MMHG | WEIGHT: 164 LBS | OXYGEN SATURATION: 98 % | BODY MASS INDEX: 29.06 KG/M2 | HEIGHT: 63 IN | RESPIRATION RATE: 16 BRPM | HEART RATE: 65 BPM

## 2019-05-28 PROCEDURE — 99214 OFFICE O/P EST MOD 30 MIN: CPT

## 2019-05-28 NOTE — PHYSICAL EXAM
[General Appearance - Alert] : alert [Sclera] : the sclera and conjunctiva were normal [General Appearance - In No Acute Distress] : in no acute distress [PERRL With Normal Accommodation] : pupils were equal in size, round, and reactive to light [Extraocular Movements] : extraocular movements were intact [Outer Ear] : the ears and nose were normal in appearance [Neck Appearance] : the appearance of the neck was normal [Oropharynx] : the oropharynx was normal [Thyroid Diffuse Enlargement] : the thyroid was not enlarged [Neck Cervical Mass (___cm)] : no neck mass was observed [Jugular Venous Distention Increased] : there was no jugular-venous distention [Thyroid Nodule] : there were no palpable thyroid nodules [Auscultation Breath Sounds / Voice Sounds] : lungs were clear to auscultation bilaterally [Heart Sounds] : normal S1 and S2 [Heart Rate And Rhythm] : heart rate was normal and rhythm regular [Heart Sounds Gallop] : no gallops [Heart Sounds Pericardial Friction Rub] : no pericardial rub [Murmurs] : no murmurs [Edema] : there was no peripheral edema [Full Pulse] : the pedal pulses are present [Bowel Sounds] : normal bowel sounds [Abdomen Soft] : soft [Abdomen Mass (___ Cm)] : no abdominal mass palpated [Abdomen Tenderness] : non-tender [Cervical Lymph Nodes Enlarged Anterior Bilaterally] : anterior cervical [Cervical Lymph Nodes Enlarged Posterior Bilaterally] : posterior cervical [Axillary Lymph Nodes Enlarged Bilaterally] : axillary [Supraclavicular Lymph Nodes Enlarged Bilaterally] : supraclavicular [Femoral Lymph Nodes Enlarged Bilaterally] : femoral [No Spinal Tenderness] : no spinal tenderness [No CVA Tenderness] : no ~M costovertebral angle tenderness [Inguinal Lymph Nodes Enlarged Bilaterally] : inguinal [Musculoskeletal - Swelling] : no joint swelling seen [Abnormal Walk] : normal gait [Nail Clubbing] : no clubbing  or cyanosis of the fingernails [Motor Tone] : muscle strength and tone were normal [Skin Color & Pigmentation] : normal skin color and pigmentation [Skin Turgor] : normal skin turgor [] : no rash [No Focal Deficits] : no focal deficits [Impaired Insight] : insight and judgment were intact [Oriented To Time, Place, And Person] : oriented to person, place, and time [Affect] : the affect was normal

## 2019-05-28 NOTE — HISTORY OF PRESENT ILLNESS
[de-identified] : The patient arrived for followup. She underwent sigmoidoscopy for intractable abdominal discomfort and diarrhea.Stool workup including stool studies, small intestinal bacterial overgrowth test was negative. 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 She underwent flexible sigmoidoscopy without sedation and the biopsies were suggestive of colitis. She had diverticulosis and Rectal polyp. The rectal polyp was inflammatory. The patient was started on budesonide.She was started on 3 mg budesonide 3 times a day. Her symptoms immediately improved. \par \par Once she stopped budesonide, her symptoms of abdominal discomfort and diarrhea returned.She was placed back on budesonide 9 mg. Now she is doing better.No rectal bleeding\par

## 2019-05-28 NOTE — ASSESSMENT
[FreeTextEntry1] : At this time I am recommending to start mesalamine in combination to budesonide. We will slowly taper off budesonide. We will followup in 6 weeks

## 2019-06-21 ENCOUNTER — APPOINTMENT (OUTPATIENT)
Dept: INTERNAL MEDICINE | Facility: CLINIC | Age: 84
End: 2019-06-21
Payer: MEDICARE

## 2019-06-21 VITALS
SYSTOLIC BLOOD PRESSURE: 123 MMHG | HEART RATE: 71 BPM | RESPIRATION RATE: 14 BRPM | BODY MASS INDEX: 28.7 KG/M2 | HEIGHT: 63 IN | DIASTOLIC BLOOD PRESSURE: 80 MMHG | WEIGHT: 162 LBS

## 2019-06-21 PROCEDURE — 99213 OFFICE O/P EST LOW 20 MIN: CPT | Mod: 25

## 2019-06-21 PROCEDURE — 36415 COLL VENOUS BLD VENIPUNCTURE: CPT

## 2019-06-21 NOTE — PHYSICAL EXAM
[General Appearance - In No Acute Distress] : in no acute distress [] : no respiratory distress [Respiration, Rhythm And Depth] : normal respiratory rhythm and effort [Auscultation Breath Sounds / Voice Sounds] : lungs were clear to auscultation bilaterally [Heart Rate And Rhythm] : heart rate was normal and rhythm regular [Affect] : the affect was normal [Mood] : the mood was normal [de-identified] : Healed laceration left nares and abrasion bridge of nose

## 2019-06-21 NOTE — ASSESSMENT
[FreeTextEntry1] : 83-year-old female currently medically stable with controlled hypertension and no evidence of cardiac decompensation.\par \par Asthma is controlled.\par \par Patient with relatively new diagnosis of colitis being controlled with budesonide.\par Questionable side effects secondary to Lialda with patient Will discuss with GI.\par \par Blood work done to assessment metabolic status.  done on requests a GYN\par \par Shingrix d/w pt\par Mammogram-4/2018\par bone density is up-to-date by GYN-to call for report=5/2017\par Colonoscopy was 2009, no need for follow up\par Sigmoidoscopy 11/2018\par Currently up to date with her specialists which include..\par 1. cardiology-Dr. Arana (stress test 10/2018)\par 2. pulmonary-Dr. Andrea\par 3. dermatology-Dr. Rosas\par 4. gynecology-Dr. Vaca\par 5. ophthalmology-\par 6..podiatry prn-Dr. Thomas\par 7. Orthopedic-Dr. Moreno\par 8.GI-Dr. Cobb\par HIV screening=declines\par hepatitis C screening=4/2018\par \par

## 2019-06-21 NOTE — HISTORY OF PRESENT ILLNESS
[FreeTextEntry1] : 83-year-old female presents for followup with history of hypertension, hyperlipidemia and mild CAD.\par \par She also was recently diagnosed with colitis likely Crohn's and is being treated with budesonide with benefit. \par A trial of off he doesn't know was done with immediate return of symptoms. Therefore the patient continues on budesonide and Lialda was added. Since this admission the patient feels she's getting side effects therefore to discuss with GI.\par \par The patient also has asthma which has been controlledr\par \par Overall she is feeling well without complaints including no chest pain, palpitations, shortness of breath or edema.\par \par The patient did trip and fall while in [place] on March 4 with facial trauma including a nasal laceration and trauma to the bridge of her nose.No falls since\par

## 2019-06-22 LAB
ALBUMIN SERPL ELPH-MCNC: 4.3 G/DL
ALP BLD-CCNC: 81 U/L
ALT SERPL-CCNC: 15 U/L
ANION GAP SERPL CALC-SCNC: 15 MMOL/L
AST SERPL-CCNC: 16 U/L
BASOPHILS # BLD AUTO: 0.04 K/UL
BASOPHILS NFR BLD AUTO: 0.4 %
BILIRUB SERPL-MCNC: 0.4 MG/DL
BUN SERPL-MCNC: 23 MG/DL
CALCIUM SERPL-MCNC: 9.5 MG/DL
CANCER AG125 SERPL-ACNC: 12 U/ML
CHLORIDE SERPL-SCNC: 100 MMOL/L
CHOLEST SERPL-MCNC: 200 MG/DL
CHOLEST/HDLC SERPL: 2.8 RATIO
CO2 SERPL-SCNC: 20 MMOL/L
CREAT SERPL-MCNC: 0.64 MG/DL
EOSINOPHIL # BLD AUTO: 0.03 K/UL
EOSINOPHIL NFR BLD AUTO: 0.3 %
GLUCOSE SERPL-MCNC: 106 MG/DL
HCT VFR BLD CALC: 42.6 %
HDLC SERPL-MCNC: 72 MG/DL
HGB BLD-MCNC: 14 G/DL
IMM GRANULOCYTES NFR BLD AUTO: 0.6 %
LDLC SERPL CALC-MCNC: 111 MG/DL
LYMPHOCYTES # BLD AUTO: 1.22 K/UL
LYMPHOCYTES NFR BLD AUTO: 13.6 %
MAN DIFF?: NORMAL
MCHC RBC-ENTMCNC: 29.5 PG
MCHC RBC-ENTMCNC: 32.9 GM/DL
MCV RBC AUTO: 89.9 FL
MONOCYTES # BLD AUTO: 0.71 K/UL
MONOCYTES NFR BLD AUTO: 7.9 %
NEUTROPHILS # BLD AUTO: 6.89 K/UL
NEUTROPHILS NFR BLD AUTO: 77.2 %
PLATELET # BLD AUTO: 310 K/UL
POTASSIUM SERPL-SCNC: 5.2 MMOL/L
PROT SERPL-MCNC: 7.1 G/DL
RBC # BLD: 4.74 M/UL
RBC # FLD: 13.7 %
SODIUM SERPL-SCNC: 135 MMOL/L
TRIGL SERPL-MCNC: 85 MG/DL
WBC # FLD AUTO: 8.94 K/UL

## 2019-06-24 ENCOUNTER — RESULT REVIEW (OUTPATIENT)
Age: 84
End: 2019-06-24

## 2019-07-09 ENCOUNTER — APPOINTMENT (OUTPATIENT)
Dept: GASTROENTEROLOGY | Facility: CLINIC | Age: 84
End: 2019-07-09
Payer: MEDICARE

## 2019-07-09 VITALS
HEART RATE: 68 BPM | RESPIRATION RATE: 18 BRPM | DIASTOLIC BLOOD PRESSURE: 70 MMHG | BODY MASS INDEX: 28.35 KG/M2 | OXYGEN SATURATION: 99 % | SYSTOLIC BLOOD PRESSURE: 128 MMHG | WEIGHT: 160 LBS | HEIGHT: 63 IN

## 2019-07-09 PROCEDURE — 99214 OFFICE O/P EST MOD 30 MIN: CPT

## 2019-07-09 NOTE — PHYSICAL EXAM
[General Appearance - In No Acute Distress] : in no acute distress [General Appearance - Alert] : alert [PERRL With Normal Accommodation] : pupils were equal in size, round, and reactive to light [Sclera] : the sclera and conjunctiva were normal [Outer Ear] : the ears and nose were normal in appearance [Extraocular Movements] : extraocular movements were intact [Oropharynx] : the oropharynx was normal [Neck Cervical Mass (___cm)] : no neck mass was observed [Neck Appearance] : the appearance of the neck was normal [Jugular Venous Distention Increased] : there was no jugular-venous distention [Thyroid Diffuse Enlargement] : the thyroid was not enlarged [Auscultation Breath Sounds / Voice Sounds] : lungs were clear to auscultation bilaterally [Thyroid Nodule] : there were no palpable thyroid nodules [Heart Rate And Rhythm] : heart rate was normal and rhythm regular [Heart Sounds] : normal S1 and S2 [Heart Sounds Gallop] : no gallops [Murmurs] : no murmurs [Heart Sounds Pericardial Friction Rub] : no pericardial rub [Full Pulse] : the pedal pulses are present [Edema] : there was no peripheral edema [Abdomen Soft] : soft [Bowel Sounds] : normal bowel sounds [Abdomen Tenderness] : non-tender [Cervical Lymph Nodes Enlarged Posterior Bilaterally] : posterior cervical [Cervical Lymph Nodes Enlarged Anterior Bilaterally] : anterior cervical [Abdomen Mass (___ Cm)] : no abdominal mass palpated [Supraclavicular Lymph Nodes Enlarged Bilaterally] : supraclavicular [Axillary Lymph Nodes Enlarged Bilaterally] : axillary [Femoral Lymph Nodes Enlarged Bilaterally] : femoral [Inguinal Lymph Nodes Enlarged Bilaterally] : inguinal [No CVA Tenderness] : no ~M costovertebral angle tenderness [No Spinal Tenderness] : no spinal tenderness [Nail Clubbing] : no clubbing  or cyanosis of the fingernails [Abnormal Walk] : normal gait [Musculoskeletal - Swelling] : no joint swelling seen [Motor Tone] : muscle strength and tone were normal [Skin Color & Pigmentation] : normal skin color and pigmentation [No Focal Deficits] : no focal deficits [Skin Turgor] : normal skin turgor [] : no rash [Impaired Insight] : insight and judgment were intact [Oriented To Time, Place, And Person] : oriented to person, place, and time [Affect] : the affect was normal

## 2019-07-09 NOTE — ASSESSMENT
[FreeTextEntry1] : I am recommending  to continue budesonide and continue lialda. In 2 weeks, we will decrease budesonide to 2 pills per day and then continue it for 3 weeks and then taper. If there is any flare up, she will call me. FU in 2 months

## 2019-07-09 NOTE — HISTORY OF PRESENT ILLNESS
[de-identified] : The patient arrived for a followup. The patient has left-sided colitis and has tried budesonide. After the tapering off budesonide her symptoms reoccurred. She was restarted on budesonide and lialda was added. She recently had UTI. She is feeling better. She is passing one bowel movement on a regular basis. She is denying any other complaints or rectal bleeding. Her recent labs are okay.

## 2019-07-22 ENCOUNTER — RESULT REVIEW (OUTPATIENT)
Age: 84
End: 2019-07-22

## 2019-07-22 LAB
APPEARANCE: CLEAR
BACTERIA UR CULT: NORMAL
BACTERIA: NEGATIVE
BILIRUBIN URINE: NEGATIVE
BLOOD URINE: NEGATIVE
COLOR: NORMAL
GLUCOSE QUALITATIVE U: NEGATIVE
HYALINE CASTS: 3 /LPF
KETONES URINE: NEGATIVE
LEUKOCYTE ESTERASE URINE: ABNORMAL
MICROSCOPIC-UA: NORMAL
NITRITE URINE: NEGATIVE
PH URINE: 5.5
PROTEIN URINE: NEGATIVE
RED BLOOD CELLS URINE: 1 /HPF
SPECIFIC GRAVITY URINE: 1.01
SQUAMOUS EPITHELIAL CELLS: 5 /HPF
UROBILINOGEN URINE: NORMAL
WHITE BLOOD CELLS URINE: 5 /HPF

## 2019-08-26 ENCOUNTER — MEDICATION RENEWAL (OUTPATIENT)
Age: 84
End: 2019-08-26

## 2019-08-27 ENCOUNTER — APPOINTMENT (OUTPATIENT)
Dept: GASTROENTEROLOGY | Facility: CLINIC | Age: 84
End: 2019-08-27
Payer: MEDICARE

## 2019-08-27 VITALS
WEIGHT: 167 LBS | RESPIRATION RATE: 15 BRPM | DIASTOLIC BLOOD PRESSURE: 86 MMHG | OXYGEN SATURATION: 98 % | HEART RATE: 74 BPM | HEIGHT: 63 IN | BODY MASS INDEX: 29.59 KG/M2 | SYSTOLIC BLOOD PRESSURE: 149 MMHG

## 2019-08-27 PROCEDURE — 99214 OFFICE O/P EST MOD 30 MIN: CPT

## 2019-08-27 NOTE — PHYSICAL EXAM
[General Appearance - Alert] : alert [Sclera] : the sclera and conjunctiva were normal [General Appearance - In No Acute Distress] : in no acute distress [Extraocular Movements] : extraocular movements were intact [PERRL With Normal Accommodation] : pupils were equal in size, round, and reactive to light [Outer Ear] : the ears and nose were normal in appearance [Oropharynx] : the oropharynx was normal [Neck Cervical Mass (___cm)] : no neck mass was observed [Neck Appearance] : the appearance of the neck was normal [Jugular Venous Distention Increased] : there was no jugular-venous distention [Thyroid Nodule] : there were no palpable thyroid nodules [Thyroid Diffuse Enlargement] : the thyroid was not enlarged [Auscultation Breath Sounds / Voice Sounds] : lungs were clear to auscultation bilaterally [Heart Sounds] : normal S1 and S2 [Heart Rate And Rhythm] : heart rate was normal and rhythm regular [Murmurs] : no murmurs [Heart Sounds Gallop] : no gallops [Heart Sounds Pericardial Friction Rub] : no pericardial rub [Full Pulse] : the pedal pulses are present [Edema] : there was no peripheral edema [Bowel Sounds] : normal bowel sounds [Abdomen Tenderness] : non-tender [Abdomen Soft] : soft [Cervical Lymph Nodes Enlarged Posterior Bilaterally] : posterior cervical [Abdomen Mass (___ Cm)] : no abdominal mass palpated [Supraclavicular Lymph Nodes Enlarged Bilaterally] : supraclavicular [Cervical Lymph Nodes Enlarged Anterior Bilaterally] : anterior cervical [Femoral Lymph Nodes Enlarged Bilaterally] : femoral [Axillary Lymph Nodes Enlarged Bilaterally] : axillary [No Spinal Tenderness] : no spinal tenderness [No CVA Tenderness] : no ~M costovertebral angle tenderness [Inguinal Lymph Nodes Enlarged Bilaterally] : inguinal [Nail Clubbing] : no clubbing  or cyanosis of the fingernails [Abnormal Walk] : normal gait [Musculoskeletal - Swelling] : no joint swelling seen [Motor Tone] : muscle strength and tone were normal [Skin Color & Pigmentation] : normal skin color and pigmentation [Skin Turgor] : normal skin turgor [No Focal Deficits] : no focal deficits [] : no rash [Oriented To Time, Place, And Person] : oriented to person, place, and time [Impaired Insight] : insight and judgment were intact [Affect] : the affect was normal

## 2019-08-27 NOTE — ASSESSMENT
[FreeTextEntry1] : I am recommending to add fiber to her diet. I also recommended to decrease the budesonide to one tablet per day and continue with mesalamine on the current dose. I recommended to stop the budesonide in 3 weeks after going to one tablet per day. She knows how to contact me if there are any questions. Followup in 2 months.\par \par Elan Cobb MD\par Gastroenterology \par \par

## 2019-08-27 NOTE — HISTORY OF PRESENT ILLNESS
[de-identified] : The patient arrived for followup. She has colitis. She is on budesonide and mesalamine. She is still on 2 tablets of 3 mg budesonide.She is complaining of frequent loose stools. No abdominal pain or any new GI symptoms.

## 2019-09-12 ENCOUNTER — APPOINTMENT (OUTPATIENT)
Dept: PULMONOLOGY | Facility: CLINIC | Age: 84
End: 2019-09-12
Payer: MEDICARE

## 2019-09-12 VITALS
HEART RATE: 84 BPM | WEIGHT: 163 LBS | OXYGEN SATURATION: 96 % | HEIGHT: 62.5 IN | SYSTOLIC BLOOD PRESSURE: 112 MMHG | DIASTOLIC BLOOD PRESSURE: 76 MMHG | BODY MASS INDEX: 29.25 KG/M2

## 2019-09-12 PROCEDURE — 94727 GAS DIL/WSHOT DETER LNG VOL: CPT

## 2019-09-12 PROCEDURE — 85018 HEMOGLOBIN: CPT | Mod: QW

## 2019-09-12 PROCEDURE — 94729 DIFFUSING CAPACITY: CPT

## 2019-09-12 PROCEDURE — 99214 OFFICE O/P EST MOD 30 MIN: CPT | Mod: 25

## 2019-09-12 PROCEDURE — 94010 BREATHING CAPACITY TEST: CPT

## 2019-09-12 NOTE — REVIEW OF SYSTEMS
[Fever] : no fever [Chills] : no chills [Dry Eyes] : no dryness of the eyes [Eye Irritation] : no ~T irritation of the eyes [Nasal Congestion] : no nasal congestion [Epistaxis] : no nosebleeds [Sinus Problems] : no sinus problems [Postnasal Drip] : no postnasal drip [Sputum] : not coughing up ~M sputum [Cough] : no cough [Hemoptysis] : no hemoptysis [Chest Tightness] : no chest tightness [Dyspnea] : no dyspnea [Pleuritic Pain] : no pleuritic pain [Wheezing] : no wheezing [Hypertension] : ~T hypertension [Chest Discomfort] : no chest discomfort [Dysrhythmia] : no dysrhythmia [Murmurs] : no murmurs were heard [Palpitations] : no palpitations [Hay Fever] : no hay fever [Edema] : ~T edema was not present [Itchy Eyes] : no itching of ~T the eyes [Reflux] : no reflux [Nausea] : no nausea [Vomiting] : no vomiting [Diarrhea] : no diarrhea [Constipation] : no constipation [Abdominal Pain] : no abdominal pain [Dysuria] : no dysuria [Trauma] : no ~T physical trauma [Fracture] : no fracture [Anemia] : no anemia [Headache] : no headache [Dizziness] : no dizziness [Syncope] : no fainting [Numbness] : no numbness [Paralysis] : no paralysis was seen [Seizures] : no seizures [Depression] : no depression [Anxiety] : no anxiety [Diabetes] : no diabetes mellitus [Thyroid Problem] : no thyroid problem [Snoring] : no snoring [Witnessed Apneas] : demonstrated no ~M apnea [Nonrestorative Sleep] : restorative sleep

## 2019-09-12 NOTE — PROCEDURE
[FreeTextEntry1] : PFTs 7/25/18 - essentially normal and were near baseline.\par PFTs 9/12/19 - essentially normal with a mildly reduced diffusion capacity which corrected for alveolar volume; overall her lung function was without change

## 2019-09-12 NOTE — PHYSICAL EXAM
[General Appearance - Well Developed] : well developed [Normal Appearance] : normal appearance [General Appearance - In No Acute Distress] : no acute distress [Normal Conjunctiva] : the conjunctiva exhibited no abnormalities [Low Lying Soft Palate] : low lying soft palate [Enlarged Base of the Tongue] : enlargement of the base of the tongue [III] : III [Neck Appearance] : the appearance of the neck was normal [Heart Rate And Rhythm] : heart rate and rhythm were normal [Heart Sounds] : normal S1 and S2 [Murmurs] : no murmurs present [Edema] : no peripheral edema present [] : no respiratory distress [Respiration, Rhythm And Depth] : normal respiratory rhythm and effort [FreeTextEntry1] : No abnormalities [Abdomen Soft] : soft [Abdomen Tenderness] : non-tender [Abnormal Walk] : normal gait [Nail Clubbing] : no clubbing of the fingernails [Cyanosis, Localized] : no localized cyanosis [No Focal Deficits] : no focal deficits [Oriented To Time, Place, And Person] : oriented to person, place, and time

## 2019-09-12 NOTE — DISCUSSION/SUMMARY
[FreeTextEntry1] : \par #1. Continue Advair 250/50 for mild asthma; attempts at lowering the dose resulted in increased symptoms so will continue current dose\par #2. Montelukast nightly\par #3. Ventolin as needed\par #4. Diet and exercise for weight loss\par #5. SOBOE is likely related to weight and deconditioning\par #6. F/u 6 months\par #7. There is no pulmonary contraindication for the patient's upcoming cardiac w/u, including catheterization if needed\par

## 2019-09-12 NOTE — CONSULT LETTER
[Dear  ___] : Dear  [unfilled], [Consult Letter:] : I had the pleasure of evaluating your patient, [unfilled]. [Please see my note below.] : Please see my note below. [Consult Closing:] : Thank you very much for allowing me to participate in the care of this patient.  If you have any questions, please do not hesitate to contact me. [Sincerely,] : Sincerely, [FreeTextEntry3] : Nikos Andrea MD, FCCP, D. ABSM\par Pulmonary and Sleep Medicine\par [facility] [place]\par  [DrValeria  ___] : Dr. RODRÍGUEZ [DrValeira ___] : Dr. RODRÍGUEZ [Nikos Andrea MD] : Nikos Andrea MD

## 2019-09-12 NOTE — HISTORY OF PRESENT ILLNESS
[FreeTextEntry1] : The patient presents for evaluation for her upcoming catheterization.\par She c/o increased SOBOE despite no change in her lung function so will be undergoing cardiac w/u. [Mild Persistent] : mild persistent [Doing Well] : doing well [Well Controlled] : Well controlled [None] : None [Adherent] : the patient is adherent with ~his/her~ medication regimen [Goals--Doing Well] : the patient is doing well with ~his/her~ asthma goals [PFTs] : pulmonary function tests

## 2019-09-17 ENCOUNTER — APPOINTMENT (OUTPATIENT)
Dept: INTERNAL MEDICINE | Facility: CLINIC | Age: 84
End: 2019-09-17
Payer: MEDICARE

## 2019-09-17 VITALS
RESPIRATION RATE: 16 BRPM | BODY MASS INDEX: 30.5 KG/M2 | DIASTOLIC BLOOD PRESSURE: 70 MMHG | HEIGHT: 62.5 IN | WEIGHT: 170 LBS | HEART RATE: 70 BPM | SYSTOLIC BLOOD PRESSURE: 115 MMHG

## 2019-09-17 DIAGNOSIS — R10.12 LEFT UPPER QUADRANT PAIN: ICD-10-CM

## 2019-09-17 DIAGNOSIS — H81.90 UNSPECIFIED DISORDER OF VESTIBULAR FUNCTION, UNSPECIFIED EAR: ICD-10-CM

## 2019-09-17 DIAGNOSIS — Z87.440 PERSONAL HISTORY OF URINARY (TRACT) INFECTIONS: ICD-10-CM

## 2019-09-17 DIAGNOSIS — Z01.818 ENCOUNTER FOR OTHER PREPROCEDURAL EXAMINATION: ICD-10-CM

## 2019-09-17 DIAGNOSIS — G89.29 LEFT UPPER QUADRANT PAIN: ICD-10-CM

## 2019-09-17 PROCEDURE — 36415 COLL VENOUS BLD VENIPUNCTURE: CPT

## 2019-09-17 PROCEDURE — G0442 ANNUAL ALCOHOL SCREEN 15 MIN: CPT | Mod: 59

## 2019-09-17 PROCEDURE — G0444 DEPRESSION SCREEN ANNUAL: CPT | Mod: 59

## 2019-09-17 PROCEDURE — G0439: CPT

## 2019-09-17 NOTE — COUNSELING
[Healthy eating counseling provided] : healthy eating [Walking] : Walking [None] : None [Needs reinforcement, provided] : Patient needs reinforcement on understanding lifestyle changes and  the steps needed to achieve self management goals and reinforcement was provided [Decrease Portions] : Decrease food portions

## 2019-09-17 NOTE — REVIEW OF SYSTEMS
[Dyspnea on Exertion] : dyspnea on exertion [SOB on Exertion] : shortness of breath during exertion [Joint Pain] : joint pain [Negative] : Endocrine [As Noted in HPI] : as noted in HPI

## 2019-09-17 NOTE — HISTORY OF PRESENT ILLNESS
[FreeTextEntry1] : 83-year-old female with history of asthma, mild, nonobstructive CAD, hypertension, hyperlipidemia and osteopenia presents for her yearly physical.\par history also includes prediabetes and hypertension.\par \par The patient has 2 significant chronic medical issues.\par -Patient with progressive dyspnea on exertion worsening over the past 2-3 years with recent pulmonary evaluation with controlled asthma with pulmonary feeling it is not related to her asthma.\par Patient is in progress with cardiology doing a workup with echo scheduled and likely to have cardiac catheterization.\par The dyspnea on exertion papers for lifestyle to wear she gets dyspneic even with normal daily activities.\par The patient was an avid walker but cannot do this any longer therefore weight gain.\par \par -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 The patient had a left breast biopsy, which was benign. July 2018.\par

## 2019-09-17 NOTE — HEALTH RISK ASSESSMENT
[Good] : ~his/her~ current health as good [No falls in past year] : Patient reported no falls in the past year [0] : 2) Feeling down, depressed, or hopeless: Not at all (0) [None] : None [Alone] : lives alone [College] : College [Retired] : retired [# Of Children ___] : has [unfilled] children [] :  [Sexually Active] : sexually active [Feels Safe at Home] : Feels safe at home [Fully functional (bathing, dressing, toileting, transferring, walking, feeding)] : Fully functional (bathing, dressing, toileting, transferring, walking, feeding) [Fully functional (using the telephone, shopping, preparing meals, housekeeping, doing laundry, using] : Fully functional and needs no help or supervision to perform IADLs (using the telephone, shopping, preparing meals, housekeeping, doing laundry, using transportation, managing medications and managing finances) [Carbon Monoxide Detector] : carbon monoxide detector [Smoke Detector] : smoke detector [Seat Belt] :  uses seat belt [Sunscreen] : uses sunscreen [Discussed at today's visit] : Advance Directives Discussed at today's visit [Designated Healthcare Proxy] : Designated healthcare proxy [Name: ___] : Health Care Proxy's Name: [unfilled]  [Very Good] : ~his/her~  mood as very good [Yes] : Yes [Monthly or less (1 pt)] : Monthly or less (1 point) [Never (0 pts)] : Never (0 points) [1 or 2 (0 pts)] : 1 or 2 (0 points) [No] : In the past 12 months have you used drugs other than those required for medical reasons? 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

## 2019-09-17 NOTE — ASSESSMENT
[FreeTextEntry1] : 83-year-old female who is quite active and relatively stable with controlled hypertension and asthma\par \par The patient with persistent, progressive dyspnea on exertion which has changed her lifestyle which he cannot walk for exercise any longer. Known mild nonobstructive CAD but concern for cardiac etiology therefore cardiac evaluation being done and patient likely will have cardiac catheterization.\par \par Colitis diagnosed one year ago not controlled on mesalamine only.\par \par  Asthma is controlled with her Advair and Singulair and her GERD is controlled without meds.. Patient is to continue present treatment.\par \par Cardiology followup as scheduled\par Mammography May 2019\par Bone density May 2017\par Colonoscopy 2009 without followup needed unless any issue. Sigmoidoscopy May 2018\par GYN \par \par Vaccines up to date including patient already having received influenza vaccine\par Shingrix discussed\par \par Followup in 3 months and as needed\par \par

## 2019-09-17 NOTE — PHYSICAL EXAM
[General Appearance - Alert] : alert [General Appearance - In No Acute Distress] : in no acute distress [PERRL With Normal Accommodation] : pupils were equal in size, round, and reactive to light [Extraocular Movements] : extraocular movements were intact [Sclera] : the sclera and conjunctiva were normal [Oropharynx] : the oropharynx was normal [Outer Ear] : the ears and nose were normal in appearance [Neck Appearance] : the appearance of the neck was normal [Neck Cervical Mass (___cm)] : no neck mass was observed [Thyroid Diffuse Enlargement] : the thyroid was not enlarged [Jugular Venous Distention Increased] : there was no jugular-venous distention [Thyroid Nodule] : there were no palpable thyroid nodules [Auscultation Breath Sounds / Voice Sounds] : lungs were clear to auscultation bilaterally [Heart Rate And Rhythm] : heart rate was normal and rhythm regular [Heart Sounds Gallop] : no gallops [Heart Sounds] : normal S1 and S2 [Heart Sounds Pericardial Friction Rub] : no pericardial rub [Murmurs] : no murmurs [Edema] : there was no peripheral edema [Full Pulse] : the pedal pulses are present [Bowel Sounds] : normal bowel sounds [Abdomen Tenderness] : non-tender [Abdomen Soft] : soft [] : no hepato-splenomegaly [Abdomen Mass (___ Cm)] : no abdominal mass palpated [Cervical Lymph Nodes Enlarged Posterior Bilaterally] : posterior cervical [Cervical Lymph Nodes Enlarged Anterior Bilaterally] : anterior cervical [Femoral Lymph Nodes Enlarged Bilaterally] : femoral [Axillary Lymph Nodes Enlarged Bilaterally] : axillary [Supraclavicular Lymph Nodes Enlarged Bilaterally] : supraclavicular [Inguinal Lymph Nodes Enlarged Bilaterally] : inguinal [No CVA Tenderness] : no ~M costovertebral angle tenderness [No Spinal Tenderness] : no spinal tenderness [Abnormal Walk] : normal gait [Nail Clubbing] : no clubbing  or cyanosis of the fingernails [Musculoskeletal - Swelling] : no joint swelling seen [Motor Tone] : muscle strength and tone were normal [Cranial Nerves] : cranial nerves 2-12 were intact [No Focal Deficits] : no focal deficits [Impaired Insight] : insight and judgment were intact [Oriented To Time, Place, And Person] : oriented to person, place, and time [Affect] : the affect was normal [FreeTextEntry1] : multiple nevi. follows with dermatology

## 2019-09-18 ENCOUNTER — RESULT REVIEW (OUTPATIENT)
Age: 84
End: 2019-09-18

## 2019-09-18 LAB
25(OH)D3 SERPL-MCNC: 56.2 NG/ML
ALBUMIN SERPL ELPH-MCNC: 4.5 G/DL
ALP BLD-CCNC: 71 U/L
ALT SERPL-CCNC: 21 U/L
ANION GAP SERPL CALC-SCNC: 14 MMOL/L
APPEARANCE: CLEAR
AST SERPL-CCNC: 17 U/L
BACTERIA: NEGATIVE
BASOPHILS # BLD AUTO: 0.06 K/UL
BASOPHILS NFR BLD AUTO: 1.1 %
BILIRUB SERPL-MCNC: 0.3 MG/DL
BILIRUBIN URINE: NEGATIVE
BLOOD URINE: NEGATIVE
BUN SERPL-MCNC: 16 MG/DL
CALCIUM SERPL-MCNC: 9.9 MG/DL
CHLORIDE SERPL-SCNC: 101 MMOL/L
CHOLEST SERPL-MCNC: 191 MG/DL
CHOLEST/HDLC SERPL: 3.3 RATIO
CO2 SERPL-SCNC: 22 MMOL/L
COLOR: NORMAL
CREAT SERPL-MCNC: 0.56 MG/DL
EOSINOPHIL # BLD AUTO: 0.16 K/UL
EOSINOPHIL NFR BLD AUTO: 2.8 %
ESTIMATED AVERAGE GLUCOSE: 114 MG/DL
GLUCOSE QUALITATIVE U: NEGATIVE
GLUCOSE SERPL-MCNC: 99 MG/DL
HBA1C MFR BLD HPLC: 5.6 %
HCT VFR BLD CALC: 43.8 %
HDLC SERPL-MCNC: 58 MG/DL
HGB BLD-MCNC: 14 G/DL
HYALINE CASTS: 0 /LPF
IMM GRANULOCYTES NFR BLD AUTO: 0.9 %
KETONES URINE: NEGATIVE
LDLC SERPL CALC-MCNC: 109 MG/DL
LEUKOCYTE ESTERASE URINE: NEGATIVE
LYMPHOCYTES # BLD AUTO: 1.54 K/UL
LYMPHOCYTES NFR BLD AUTO: 27.3 %
MAN DIFF?: NORMAL
MCHC RBC-ENTMCNC: 29.5 PG
MCHC RBC-ENTMCNC: 32 GM/DL
MCV RBC AUTO: 92.2 FL
MICROSCOPIC-UA: NORMAL
MONOCYTES # BLD AUTO: 0.49 K/UL
MONOCYTES NFR BLD AUTO: 8.7 %
NEUTROPHILS # BLD AUTO: 3.35 K/UL
NEUTROPHILS NFR BLD AUTO: 59.2 %
NITRITE URINE: NEGATIVE
PH URINE: 7
PLATELET # BLD AUTO: 333 K/UL
POTASSIUM SERPL-SCNC: 4.9 MMOL/L
PROT SERPL-MCNC: 7 G/DL
PROTEIN URINE: NEGATIVE
RBC # BLD: 4.75 M/UL
RBC # FLD: 14.2 %
RED BLOOD CELLS URINE: 0 /HPF
SODIUM SERPL-SCNC: 137 MMOL/L
SPECIFIC GRAVITY URINE: 1.01
SQUAMOUS EPITHELIAL CELLS: 1 /HPF
TRIGL SERPL-MCNC: 118 MG/DL
UROBILINOGEN URINE: NORMAL
WBC # FLD AUTO: 5.65 K/UL
WHITE BLOOD CELLS URINE: 1 /HPF

## 2019-09-27 ENCOUNTER — TRANSCRIPTION ENCOUNTER (OUTPATIENT)
Age: 84
End: 2019-09-27

## 2019-09-27 ENCOUNTER — OUTPATIENT (OUTPATIENT)
Dept: OUTPATIENT SERVICES | Facility: HOSPITAL | Age: 84
LOS: 1 days | End: 2019-09-27
Payer: MEDICARE

## 2019-09-27 VITALS
WEIGHT: 160.94 LBS | RESPIRATION RATE: 16 BRPM | TEMPERATURE: 98 F | HEIGHT: 63 IN | HEART RATE: 67 BPM | OXYGEN SATURATION: 97 %

## 2019-09-27 VITALS
DIASTOLIC BLOOD PRESSURE: 98 MMHG | RESPIRATION RATE: 16 BRPM | OXYGEN SATURATION: 98 % | SYSTOLIC BLOOD PRESSURE: 141 MMHG | HEART RATE: 75 BPM

## 2019-09-27 DIAGNOSIS — I25.10 ATHEROSCLEROTIC HEART DISEASE OF NATIVE CORONARY ARTERY WITHOUT ANGINA PECTORIS: ICD-10-CM

## 2019-09-27 LAB
ANION GAP SERPL CALC-SCNC: 11 MMOL/L — SIGNIFICANT CHANGE UP (ref 5–17)
APTT BLD: 28.4 SEC — SIGNIFICANT CHANGE UP (ref 27.5–36.3)
BUN SERPL-MCNC: 17 MG/DL — SIGNIFICANT CHANGE UP (ref 8–20)
CALCIUM SERPL-MCNC: 9.7 MG/DL — SIGNIFICANT CHANGE UP (ref 8.6–10.2)
CHLORIDE SERPL-SCNC: 99 MMOL/L — SIGNIFICANT CHANGE UP (ref 98–107)
CO2 SERPL-SCNC: 26 MMOL/L — SIGNIFICANT CHANGE UP (ref 22–29)
CREAT SERPL-MCNC: 0.5 MG/DL — SIGNIFICANT CHANGE UP (ref 0.5–1.3)
GLUCOSE SERPL-MCNC: 106 MG/DL — SIGNIFICANT CHANGE UP (ref 70–115)
HCT VFR BLD CALC: 41.4 % — SIGNIFICANT CHANGE UP (ref 34.5–45)
HGB BLD-MCNC: 13.3 G/DL — SIGNIFICANT CHANGE UP (ref 11.5–15.5)
INR BLD: 0.88 RATIO — SIGNIFICANT CHANGE UP (ref 0.88–1.16)
MAGNESIUM SERPL-MCNC: 2.3 MG/DL — SIGNIFICANT CHANGE UP (ref 1.6–2.6)
MCHC RBC-ENTMCNC: 29 PG — SIGNIFICANT CHANGE UP (ref 27–34)
MCHC RBC-ENTMCNC: 32.1 GM/DL — SIGNIFICANT CHANGE UP (ref 32–36)
MCV RBC AUTO: 90.4 FL — SIGNIFICANT CHANGE UP (ref 80–100)
PLATELET # BLD AUTO: 310 K/UL — SIGNIFICANT CHANGE UP (ref 150–400)
POTASSIUM SERPL-MCNC: 5.2 MMOL/L — SIGNIFICANT CHANGE UP (ref 3.5–5.3)
POTASSIUM SERPL-SCNC: 5.2 MMOL/L — SIGNIFICANT CHANGE UP (ref 3.5–5.3)
PROTHROM AB SERPL-ACNC: 10.1 SEC — SIGNIFICANT CHANGE UP (ref 10–12.9)
RBC # BLD: 4.58 M/UL — SIGNIFICANT CHANGE UP (ref 3.8–5.2)
RBC # FLD: 13.9 % — SIGNIFICANT CHANGE UP (ref 10.3–14.5)
SODIUM SERPL-SCNC: 136 MMOL/L — SIGNIFICANT CHANGE UP (ref 135–145)
WBC # BLD: 6.65 K/UL — SIGNIFICANT CHANGE UP (ref 3.8–10.5)
WBC # FLD AUTO: 6.65 K/UL — SIGNIFICANT CHANGE UP (ref 3.8–10.5)

## 2019-09-27 PROCEDURE — 85730 THROMBOPLASTIN TIME PARTIAL: CPT

## 2019-09-27 PROCEDURE — C1769: CPT

## 2019-09-27 PROCEDURE — 99152 MOD SED SAME PHYS/QHP 5/>YRS: CPT

## 2019-09-27 PROCEDURE — 93458 L HRT ARTERY/VENTRICLE ANGIO: CPT

## 2019-09-27 PROCEDURE — C1887: CPT

## 2019-09-27 PROCEDURE — 93010 ELECTROCARDIOGRAM REPORT: CPT

## 2019-09-27 PROCEDURE — 93005 ELECTROCARDIOGRAM TRACING: CPT

## 2019-09-27 PROCEDURE — 36415 COLL VENOUS BLD VENIPUNCTURE: CPT

## 2019-09-27 PROCEDURE — 80048 BASIC METABOLIC PNL TOTAL CA: CPT

## 2019-09-27 PROCEDURE — 85027 COMPLETE CBC AUTOMATED: CPT

## 2019-09-27 PROCEDURE — 85610 PROTHROMBIN TIME: CPT

## 2019-09-27 PROCEDURE — 83735 ASSAY OF MAGNESIUM: CPT

## 2019-09-27 PROCEDURE — C1760: CPT

## 2019-09-27 RX ORDER — ISRADIPINE 10 MG
1 TABLET, EXTENDED RELEASE 24 HR ORAL
Qty: 0 | Refills: 0 | DISCHARGE

## 2019-09-27 RX ORDER — LISINOPRIL 2.5 MG/1
1 TABLET ORAL
Qty: 0 | Refills: 0 | DISCHARGE

## 2019-09-27 RX ORDER — CALCIUM ACETATE 667 MG
0 TABLET ORAL
Qty: 0 | Refills: 0 | DISCHARGE

## 2019-09-27 RX ORDER — MESALAMINE 400 MG
0 TABLET, DELAYED RELEASE (ENTERIC COATED) ORAL
Qty: 0 | Refills: 0 | DISCHARGE

## 2019-09-27 RX ORDER — FLUTICASONE PROPIONATE AND SALMETEROL 50; 250 UG/1; UG/1
0 POWDER ORAL; RESPIRATORY (INHALATION)
Qty: 0 | Refills: 0 | DISCHARGE

## 2019-09-27 RX ORDER — ALBUTEROL 90 UG/1
2 AEROSOL, METERED ORAL
Qty: 0 | Refills: 0 | DISCHARGE

## 2019-09-27 RX ORDER — OMEGA-3 ACID ETHYL ESTERS 1 G
0 CAPSULE ORAL
Qty: 0 | Refills: 0 | DISCHARGE

## 2019-09-27 RX ORDER — MONTELUKAST 4 MG/1
1 TABLET, CHEWABLE ORAL
Qty: 0 | Refills: 0 | DISCHARGE

## 2019-09-27 RX ORDER — BUDESONIDE, MICRONIZED 100 %
6 POWDER (GRAM) MISCELLANEOUS
Qty: 0 | Refills: 0 | DISCHARGE

## 2019-09-27 RX ORDER — ASPIRIN/CALCIUM CARB/MAGNESIUM 324 MG
1 TABLET ORAL
Qty: 0 | Refills: 0 | DISCHARGE

## 2019-09-27 RX ORDER — RALOXIFENE HYDROCHLORIDE 60 MG/1
0 TABLET, COATED ORAL
Qty: 0 | Refills: 0 | DISCHARGE

## 2019-09-27 RX ORDER — GLUCOSAMINE/CHONDROITIN/C/MANG 500-400 MG
0 CAPSULE ORAL
Qty: 0 | Refills: 0 | DISCHARGE

## 2019-09-27 RX ORDER — METOPROLOL TARTRATE 50 MG
1 TABLET ORAL
Qty: 0 | Refills: 0 | DISCHARGE

## 2019-09-27 RX ORDER — ERGOCALCIFEROL 1.25 MG/1
0 CAPSULE ORAL
Qty: 0 | Refills: 0 | DISCHARGE

## 2019-09-27 NOTE — DISCHARGE NOTE PROVIDER - NSDCFUSCHEDAPPT_GEN_ALL_CORE_FT
KELLI STEVENS ; 11/19/2019 ; [facility] [address]  KELLI STEVENS ; 11/26/2019 ; [facility] [address]  KELLI STEVENS ; 12/18/2019 ; [facility] [address] KELLI STEVENS ; 11/19/2019 ; [facility] [address]  KELLI STEVENS ; 11/26/2019 ; [facility] [address]  KELLI STEVENS ; 12/18/2019 ; [facility] [address]

## 2019-09-27 NOTE — H&P PST ADULT - NSICDXPASTMEDICALHX_GEN_ALL_CORE_FT
PAST MEDICAL HISTORY:  Asthma     Colitis     HTN (hypertension)     Hyperlipidemia     Osteoarthritis

## 2019-09-27 NOTE — H&P PST ADULT - DOES THIS PATIENT HAVE A HISTORY OF OR HAS BEEN DX WITH HEART FAILURE?
Patient called stating the last time he spoke with Dr Bailey he was going to increase his sildenafil to 100 MG.    Pharmacy needs new scripted sent over.     no

## 2019-09-27 NOTE — DISCHARGE NOTE NURSING/CASE MANAGEMENT/SOCIAL WORK - PATIENT PORTAL LINK FT
You can access the FollowMyHealth Patient Portal offered by [facility] by registering at the following website: [email or web address]. By joining [facility]’s FollowMyHealth portal, you will also be able to view your health information using other applications (apps) compatible with our system.

## 2019-09-27 NOTE — DISCHARGE NOTE PROVIDER - CARE PROVIDER_API CALL
Milan Franz)  Cardiovascular Disease; Interventional Cardiology  [address] [place] [address]  Phone: [phone]  Fax: [phone]  Follow Up Time:

## 2019-09-27 NOTE — DISCHARGE NOTE PROVIDER - NSDCCPCAREPLAN_GEN_ALL_CORE_FT
PRINCIPAL DISCHARGE DIAGNOSIS  Diagnosis: S/P coronary angiogram  Assessment and Plan of Treatment: Groin precautions   continue current medications   Follow up with Cardiologist 2 weeks

## 2019-09-27 NOTE — PROGRESS NOTE ADULT - SUBJECTIVE AND OBJECTIVE BOX
s/p LHC revealing non obstructive CAD    Patient feels well.  Denies chest pain, shortness of breath, dizziness or palpitations at this time    Right groin procedure site CDI.  no bleeding, no hematoma, site soft, non tender, positive pedal pulses bilaterally  hemostasis with Angio-seal         HPI:  This a 83 year old female with HTN HL and asthma . Pt reports progressive limiting SOB with exertion. She denies palpitations of syncope no recent hospitalizations . She was evaluated by her pulmonologist for her long standing asthma and was referred to cardiologist  Pt presented to Cardiologist for evaluation   A EKG and echo were completed.10/4/18 NST noted for  small area of ischemia on apical anterolateral wall EF70%   She was recommended to have a LHC   Pt had recent bout of colitis with episodes of diarrhea being treated  with mesalamine and antibiotic with improvement   s/pLHC via RFA tolerated well nonobstructive disease     EKG NSR with PVC LAE no acute ST or T wave changes   Symptoms: SOB and fatigue   Angina (Class): Class 2-3       Heart Failure: None :        Assessment of LVEF (Must be within 6 months):       EF: 60       Assessed by: cath        Date:     now s/p LHC revealing non obstructive CAD via RFA tolerated well     -continue tele till discharge     -vital signs, labs, diet and activity per post procedure orders  ambulate ad izabela post bedrest  -iv hydration  -encourage PO fluids  -plan of care discussed with patient, family and MD   -post procedure and d/c instructions reviewed  -follow up with MD in 1-2 weeks  -Discussed therapeutic lifestyle changes to reduce risk factors such as following a cardiac diet, weight loss, maintaining a healthy weight, exercise, smoking cessation, medication compliance, and regular follow-up  with MD to know your numbers (BP, cholesterol, weight, and glucose)

## 2019-09-27 NOTE — H&P PST ADULT - ASSESSMENT
This is a 83 year  old female with HTN HL asthma with progressive SOB and abnormal cardiac testing   Presents for LHC   Currently appears well however  hypertensive   PRE-PROCEDURE ASSESSMENT  LHC with Dr Franz   -Patient seen and examined  -Labs reviewed  -Pre-procedure teaching completed with patient   -Questions answered  - ASA 81 mg po today

## 2019-09-27 NOTE — H&P PST ADULT - HISTORY OF PRESENT ILLNESS
This a 83 year old female with HTN HL and asthma . Pt reports progressive limiting SOB with exertion. She denies palpitations of syncope no recent hospitalizations . She was evaluated by her pulmonologist for her long standing asthma and was referred to cardiologist  Pt presented to Cardiologist for evaluation   A EKG and echo were completed.10/4/18 NST noted for  small area of ischemia on apical anterolateral wall EF70%   She was recommended to have a C   Pt had recent bout of colitis with episodes of diarrhea being treated  with mesalamine and antibiotic with improvement   Mallampati 2   ASA 2   BRA   GFR     EKG NSR with PVC LAE no acute ST or T wave changes   Symptoms: SOB and fatigue   Angina (Class): Class 2-3       Heart Failure: None :        Assessment of LVEF (Must be within 6 months):       EF: 60       Assessed by: cath        Date:     Prior Cardiac Interventions (LHC, stents, CABG):None   Cath 2009 non-obstructive disease Mild LAD, LCX disease EF 60%          Noninvasive Testing: noted for  small area of ischemia on apical anterolateral wall EF70%     Stress Test: Date:        Protocol:        Duration of Exercise:        Symptoms: sob        EKG Changes:        Myocardial Imaging: noted for  small area of ischemia on apical anterolateral wall EF70%        Risk Assessment (Low, Medium, High):     Echo (Date, Findings):     Antianginal Therapies:        Beta Blockers:         Calcium Channel Blockers:        Long Acting Nitrates:        Ranexa:     Associated Risk Factors:        Cerebrovascular Disease: N/A       Chronic Lung Disease: N/A       Peripheral Arterial Disease: N/A       Chronic Kidney Disease (if yes, what is GFR): N/A       Uncontrolled Diabetes (if yes, what is HgbA1C or FBS): N/A       Poorly Controlled Hypertension (if yes, what is SBP): N/A       Morbid Obesity (if yes, what is BMI): N/A       History of Recent Ventricular Arrhythmia: N/A       Inability to Ambulate Safely: N/A       Need for Therapeutic Anticoagulation: N/A       Antiplatelet or Contrast Allergy: N/A This a 83 year old female with HTN HL and asthma . Pt reports progressive limiting SOB with exertion. She denies palpitations of syncope no recent hospitalizations . She was evaluated by her pulmonologist for her long standing asthma and was referred to cardiologist  Pt presented to Cardiologist for evaluation   A EKG and echo were completed.10/4/18 NST noted for  small area of ischemia on apical anterolateral wall EF70%   She was recommended to have a C   Pt had recent bout of colitis with episodes of diarrhea being treated  with mesalamine and antibiotic with improvement   Mallampati 2   ASA 2   BRA 2.1      EKG NSR with PVC LAE no acute ST or T wave changes   Symptoms: SOB and fatigue   Angina (Class): Class 2-3       Heart Failure: None :        Assessment of LVEF (Must be within 6 months):       EF: 60       Assessed by: cath        Date:     Prior Cardiac Interventions (LHC, stents, CABG):None   Cath 2009 non-obstructive disease Mild LAD, LCX disease EF 60%          Noninvasive Testing: noted for  small area of ischemia on apical anterolateral wall EF70%     Stress Test: Date:        Protocol:        Duration of Exercise:        Symptoms: sob        EKG Changes:        Myocardial Imaging: noted for  small area of ischemia on apical anterolateral wall EF70%        Risk Assessment (Low, Medium, High):     Echo (Date, Findings):     Antianginal Therapies:        Beta Blockers:         Calcium Channel Blockers:        Long Acting Nitrates:        Ranexa:     Associated Risk Factors:        Cerebrovascular Disease: N/A       Chronic Lung Disease: N/A       Peripheral Arterial Disease: N/A       Chronic Kidney Disease (if yes, what is GFR): N/A       Uncontrolled Diabetes (if yes, what is HgbA1C or FBS): N/A       Poorly Controlled Hypertension (if yes, what is SBP): N/A       Morbid Obesity (if yes, what is BMI): N/A       History of Recent Ventricular Arrhythmia: N/A       Inability to Ambulate Safely: N/A       Need for Therapeutic Anticoagulation: N/A       Antiplatelet or Contrast Allergy: N/A

## 2019-09-27 NOTE — DISCHARGE NOTE PROVIDER - HOSPITAL COURSE
This a 83 year old female with HTN HL and asthma . Pt reports progressive limiting SOB with exertion. She denies palpitations of syncope no recent hospitalizations . She was evaluated by her pulmonologist for her long standing asthma and was referred to cardiologist  Pt presented to Cardiologist for evaluation     A EKG and echo were completed.10/4/18 NST noted for  small area of ischemia on apical anterolateral wall EF70%     She was recommended to have a LHC     Pt had recent bout of colitis with episodes of diarrhea being treated  with mesalamine and antibiotic with improvement     s/pLHC via RFA tolerated well nonobstructive disease     VSS     Groin precautions     continue current medications     decrease salt intake     follow up with cardiologist

## 2019-10-18 PROBLEM — K52.9 NONINFECTIVE GASTROENTERITIS AND COLITIS, UNSPECIFIED: Chronic | Status: ACTIVE | Noted: 2019-09-27

## 2019-10-18 PROBLEM — J45.909 UNSPECIFIED ASTHMA, UNCOMPLICATED: Chronic | Status: ACTIVE | Noted: 2019-09-27

## 2019-10-18 PROBLEM — I10 ESSENTIAL (PRIMARY) HYPERTENSION: Chronic | Status: ACTIVE | Noted: 2019-09-27

## 2019-10-18 PROBLEM — M19.90 UNSPECIFIED OSTEOARTHRITIS, UNSPECIFIED SITE: Chronic | Status: ACTIVE | Noted: 2019-09-27

## 2019-10-18 PROBLEM — E78.5 HYPERLIPIDEMIA, UNSPECIFIED: Chronic | Status: ACTIVE | Noted: 2019-09-27

## 2019-10-21 ENCOUNTER — APPOINTMENT (OUTPATIENT)
Dept: PULMONOLOGY | Facility: CLINIC | Age: 84
End: 2019-10-21
Payer: MEDICARE

## 2019-10-21 VITALS — HEART RATE: 76 BPM | OXYGEN SATURATION: 98 % | DIASTOLIC BLOOD PRESSURE: 80 MMHG | SYSTOLIC BLOOD PRESSURE: 120 MMHG

## 2019-10-21 VITALS — HEIGHT: 62 IN | WEIGHT: 170 LBS | BODY MASS INDEX: 31.28 KG/M2

## 2019-10-21 PROCEDURE — 99214 OFFICE O/P EST MOD 30 MIN: CPT | Mod: 25

## 2019-10-21 PROCEDURE — 94010 BREATHING CAPACITY TEST: CPT

## 2019-10-21 NOTE — DISCUSSION/SUMMARY
[FreeTextEntry1] : \par #1. Continue Advair 250/50 for mild asthma; attempts at lowering the dose resulted in increased symptoms so will continue current dose\par #2. Montelukast nightly\par #3. Ventolin as needed\par #4. Diet and exercise for weight loss\par #5. SOBOE is likely related to weight and deconditioning\par #6. F/u 6 months\par #7. Had flu vaccine 9/2019\par \par D/w Dr. Arana

## 2019-10-21 NOTE — PROCEDURE
[FreeTextEntry1] : PFTs 7/25/18 - essentially normal and were near baseline.\par PFTs 9/12/19 - essentially normal with a mildly reduced diffusion capacity which corrected for alveolar volume; overall her lung function was without change\par Spirometry 10/21/19 - essentially normal

## 2019-10-21 NOTE — CONSULT LETTER
[Dear  ___] : Dear  [unfilled], [Consult Letter:] : I had the pleasure of evaluating your patient, [unfilled]. [Please see my note below.] : Please see my note below. [Consult Closing:] : Thank you very much for allowing me to participate in the care of this patient.  If you have any questions, please do not hesitate to contact me. [Sincerely,] : Sincerely, [DrValeria  ___] : Dr. RODRÍGUEZ [DrValeria ___] : Dr. RODRÍGUEZ [Nikos Andrea MD] : Nikos Andrea MD [FreeTextEntry3] : Nikos Andrea MD, FCCP, D. ABSM\par Pulmonary and Sleep Medicine\par [facility] [place]\par

## 2019-10-21 NOTE — HISTORY OF PRESENT ILLNESS
[Mild Persistent] : mild persistent [Doing Well] : doing well [Well Controlled] : Well controlled [None] : None [Adherent] : the patient is adherent with ~his/her~ medication regimen [Goals--Doing Well] : the patient is doing well with ~his/her~ asthma goals [PFTs] : pulmonary function tests [FreeTextEntry1] : The patient presents for evaluation for her upcoming catheterization.\par She c/o increased SOBOE despite no change in her lung function and had cardiac w/u which was not diagnostic.

## 2019-10-21 NOTE — PHYSICAL EXAM
[General Appearance - Well Developed] : well developed [Normal Appearance] : normal appearance [General Appearance - In No Acute Distress] : no acute distress [Normal Conjunctiva] : the conjunctiva exhibited no abnormalities [Low Lying Soft Palate] : low lying soft palate [Enlarged Base of the Tongue] : enlargement of the base of the tongue [III] : III [Neck Appearance] : the appearance of the neck was normal [Heart Rate And Rhythm] : heart rate and rhythm were normal [Heart Sounds] : normal S1 and S2 [Murmurs] : no murmurs present [Edema] : no peripheral edema present [] : no respiratory distress [Respiration, Rhythm And Depth] : normal respiratory rhythm and effort [Abdomen Soft] : soft [Abdomen Tenderness] : non-tender [Abnormal Walk] : normal gait [Nail Clubbing] : no clubbing of the fingernails [Cyanosis, Localized] : no localized cyanosis [No Focal Deficits] : no focal deficits [Oriented To Time, Place, And Person] : oriented to person, place, and time [FreeTextEntry1] : No abnormalities

## 2019-10-21 NOTE — REVIEW OF SYSTEMS
[Hypertension] : ~T hypertension [Fever] : no fever [Dry Eyes] : no dryness of the eyes [Chills] : no chills [Nasal Congestion] : no nasal congestion [Eye Irritation] : no ~T irritation of the eyes [Epistaxis] : no nosebleeds [Postnasal Drip] : no postnasal drip [Sinus Problems] : no sinus problems [Cough] : no cough [Sputum] : not coughing up ~M sputum [Hemoptysis] : no hemoptysis [Chest Tightness] : no chest tightness [Dyspnea] : no dyspnea [Pleuritic Pain] : no pleuritic pain [Wheezing] : no wheezing [Chest Discomfort] : no chest discomfort [Dysrhythmia] : no dysrhythmia [Palpitations] : no palpitations [Edema] : ~T edema was not present [Murmurs] : no murmurs were heard [Hay Fever] : no hay fever [Nausea] : no nausea [Reflux] : no reflux [Itchy Eyes] : no itching of ~T the eyes [Diarrhea] : no diarrhea [Vomiting] : no vomiting [Constipation] : no constipation [Dysuria] : no dysuria [Abdominal Pain] : no abdominal pain [Fracture] : no fracture [Trauma] : no ~T physical trauma [Anemia] : no anemia [Headache] : no headache [Dizziness] : no dizziness [Paralysis] : no paralysis was seen [Syncope] : no fainting [Numbness] : no numbness [Depression] : no depression [Seizures] : no seizures [Anxiety] : no anxiety [Diabetes] : no diabetes mellitus [Thyroid Problem] : no thyroid problem [Snoring] : no snoring [Nonrestorative Sleep] : restorative sleep [Witnessed Apneas] : demonstrated no ~M apnea

## 2019-10-24 ENCOUNTER — RX RENEWAL (OUTPATIENT)
Age: 84
End: 2019-10-24

## 2019-10-25 ENCOUNTER — RX RENEWAL (OUTPATIENT)
Age: 84
End: 2019-10-25

## 2019-11-19 ENCOUNTER — APPOINTMENT (OUTPATIENT)
Dept: DERMATOLOGY | Facility: CLINIC | Age: 84
End: 2019-11-19

## 2019-11-25 ENCOUNTER — FORM ENCOUNTER (OUTPATIENT)
Age: 84
End: 2019-11-25

## 2019-11-26 ENCOUNTER — OUTPATIENT (OUTPATIENT)
Dept: OUTPATIENT SERVICES | Facility: HOSPITAL | Age: 84
LOS: 1 days | End: 2019-11-26

## 2019-11-26 ENCOUNTER — APPOINTMENT (OUTPATIENT)
Dept: GASTROENTEROLOGY | Facility: CLINIC | Age: 84
End: 2019-11-26
Payer: MEDICARE

## 2019-11-26 ENCOUNTER — APPOINTMENT (OUTPATIENT)
Dept: CT IMAGING | Facility: CLINIC | Age: 84
End: 2019-11-26
Payer: MEDICARE

## 2019-11-26 VITALS
HEART RATE: 76 BPM | BODY MASS INDEX: 30.18 KG/M2 | RESPIRATION RATE: 16 BRPM | OXYGEN SATURATION: 99 % | SYSTOLIC BLOOD PRESSURE: 150 MMHG | WEIGHT: 165 LBS | DIASTOLIC BLOOD PRESSURE: 88 MMHG

## 2019-11-26 DIAGNOSIS — K52.9 NONINFECTIVE GASTROENTERITIS AND COLITIS, UNSPECIFIED: ICD-10-CM

## 2019-11-26 PROCEDURE — 74177 CT ABD & PELVIS W/CONTRAST: CPT | Mod: 26

## 2019-11-26 PROCEDURE — 99214 OFFICE O/P EST MOD 30 MIN: CPT

## 2019-11-26 NOTE — PHYSICAL EXAM
[General Appearance - Alert] : alert [General Appearance - In No Acute Distress] : in no acute distress [Sclera] : the sclera and conjunctiva were normal [PERRL With Normal Accommodation] : pupils were equal in size, round, and reactive to light [Extraocular Movements] : extraocular movements were intact [Outer Ear] : the ears and nose were normal in appearance [Oropharynx] : the oropharynx was normal [Neck Appearance] : the appearance of the neck was normal [Neck Cervical Mass (___cm)] : no neck mass was observed [Jugular Venous Distention Increased] : there was no jugular-venous distention [Thyroid Diffuse Enlargement] : the thyroid was not enlarged [Thyroid Nodule] : there were no palpable thyroid nodules [Auscultation Breath Sounds / Voice Sounds] : lungs were clear to auscultation bilaterally [Heart Rate And Rhythm] : heart rate was normal and rhythm regular [Heart Sounds] : normal S1 and S2 [Heart Sounds Gallop] : no gallops [Murmurs] : no murmurs [Heart Sounds Pericardial Friction Rub] : no pericardial rub [Edema] : there was no peripheral edema [Full Pulse] : the pedal pulses are present [Abdomen Tenderness] : non-tender [Abdomen Soft] : soft [Bowel Sounds] : normal bowel sounds [Abdomen Mass (___ Cm)] : no abdominal mass palpated [Cervical Lymph Nodes Enlarged Posterior Bilaterally] : posterior cervical [Axillary Lymph Nodes Enlarged Bilaterally] : axillary [Cervical Lymph Nodes Enlarged Anterior Bilaterally] : anterior cervical [Supraclavicular Lymph Nodes Enlarged Bilaterally] : supraclavicular [Femoral Lymph Nodes Enlarged Bilaterally] : femoral [No CVA Tenderness] : no ~M costovertebral angle tenderness [Inguinal Lymph Nodes Enlarged Bilaterally] : inguinal [No Spinal Tenderness] : no spinal tenderness [Nail Clubbing] : no clubbing  or cyanosis of the fingernails [Abnormal Walk] : normal gait [Skin Color & Pigmentation] : normal skin color and pigmentation [Musculoskeletal - Swelling] : no joint swelling seen [Motor Tone] : muscle strength and tone were normal [Skin Turgor] : normal skin turgor [] : no rash [No Focal Deficits] : no focal deficits [Oriented To Time, Place, And Person] : oriented to person, place, and time [Impaired Insight] : insight and judgment were intact [Affect] : the affect was normal

## 2019-11-28 LAB
ANION GAP SERPL CALC-SCNC: 14 MMOL/L
BUN SERPL-MCNC: 22 MG/DL
CALCIUM SERPL-MCNC: 9.6 MG/DL
CHLORIDE SERPL-SCNC: 100 MMOL/L
CO2 SERPL-SCNC: 24 MMOL/L
CREAT SERPL-MCNC: 0.68 MG/DL
GLUCOSE SERPL-MCNC: 99 MG/DL
POTASSIUM SERPL-SCNC: 4.8 MMOL/L
SODIUM SERPL-SCNC: 138 MMOL/L

## 2019-12-18 ENCOUNTER — APPOINTMENT (OUTPATIENT)
Dept: INTERNAL MEDICINE | Facility: CLINIC | Age: 84
End: 2019-12-18
Payer: MEDICARE

## 2019-12-18 VITALS
HEIGHT: 62.5 IN | OXYGEN SATURATION: 95 % | DIASTOLIC BLOOD PRESSURE: 80 MMHG | WEIGHT: 159 LBS | SYSTOLIC BLOOD PRESSURE: 130 MMHG | BODY MASS INDEX: 28.53 KG/M2 | HEART RATE: 66 BPM

## 2019-12-18 PROCEDURE — 99213 OFFICE O/P EST LOW 20 MIN: CPT | Mod: 25

## 2019-12-18 PROCEDURE — 36415 COLL VENOUS BLD VENIPUNCTURE: CPT

## 2019-12-18 RX ORDER — METRONIDAZOLE 500 MG/1
500 TABLET ORAL
Qty: 30 | Refills: 0 | Status: DISCONTINUED | COMMUNITY
Start: 2019-11-28 | End: 2019-12-18

## 2019-12-18 RX ORDER — CIPROFLOXACIN HYDROCHLORIDE 500 MG/1
500 TABLET, FILM COATED ORAL
Qty: 20 | Refills: 0 | Status: DISCONTINUED | COMMUNITY
Start: 2019-11-28 | End: 2019-12-18

## 2019-12-18 NOTE — HISTORY OF PRESENT ILLNESS
[FreeTextEntry1] : Pt presents for followup on hypertension/hyperlipidemia/coronary artery disease. Patient is currently fasting for today's labs/no complaints. Patient is currently on lisinopril/isradipine for her hypertension, on fish oil for her hyperlipidemia and metoprolol/ASA for coronary artery disease.\par \par

## 2019-12-18 NOTE — ASSESSMENT
[FreeTextEntry1] : Venipuncture done in our office, Labs sent out and further recommendations will be made based on lab results. Patient advised to continue present medications with diet/exercise and specialist followup. Patient will return to the office in 3-4 months\par \par \par Shingrix d/w pt\par Mammogram-4/2019\par bone density is up-to-date by GYN-to call for report=\par Colonoscopy was 2009, no need for follow up\par Sigmoidoscopy 11/2018\par  specialists which include..\par 1. cardiology-Dr. Arana \par 2. pulmonary-Dr. Andrea\par 3. dermatology-Dr. Del Valle\par 4. gynecology-Dr. Vaca\par 5. ophthalmology-\par 6..podiatry prn-Dr. Thomas\par 7. Orthopedic-Dr. Moreno\par 8.GI-Dr. Cobb\par 9.Chiro+\par HIV screening=declines\par hepatitis C screening=4/2018\par cath 9/2019=nonobstructive CAD\par echo 9/2019

## 2019-12-19 ENCOUNTER — RESULT REVIEW (OUTPATIENT)
Age: 84
End: 2019-12-19

## 2019-12-19 LAB
ALBUMIN SERPL ELPH-MCNC: 4.3 G/DL
ALP BLD-CCNC: 68 U/L
ALT SERPL-CCNC: 27 U/L
ANION GAP SERPL CALC-SCNC: 11 MMOL/L
AST SERPL-CCNC: 22 U/L
BASOPHILS # BLD AUTO: 0.08 K/UL
BASOPHILS NFR BLD AUTO: 1.5 %
BILIRUB SERPL-MCNC: 0.2 MG/DL
BUN SERPL-MCNC: 16 MG/DL
CALCIUM SERPL-MCNC: 9.6 MG/DL
CHLORIDE SERPL-SCNC: 101 MMOL/L
CHOLEST SERPL-MCNC: 212 MG/DL
CHOLEST/HDLC SERPL: 3.1 RATIO
CO2 SERPL-SCNC: 26 MMOL/L
CREAT SERPL-MCNC: 0.52 MG/DL
EOSINOPHIL # BLD AUTO: 0.13 K/UL
EOSINOPHIL NFR BLD AUTO: 2.4 %
ESTIMATED AVERAGE GLUCOSE: 114 MG/DL
GLUCOSE SERPL-MCNC: 97 MG/DL
HBA1C MFR BLD HPLC: 5.6 %
HCT VFR BLD CALC: 42 %
HDLC SERPL-MCNC: 69 MG/DL
HGB BLD-MCNC: 13.6 G/DL
IMM GRANULOCYTES NFR BLD AUTO: 1.1 %
LDLC SERPL CALC-MCNC: 124 MG/DL
LYMPHOCYTES # BLD AUTO: 1.3 K/UL
LYMPHOCYTES NFR BLD AUTO: 24.5 %
MAN DIFF?: NORMAL
MCHC RBC-ENTMCNC: 28.9 PG
MCHC RBC-ENTMCNC: 32.4 GM/DL
MCV RBC AUTO: 89.4 FL
MONOCYTES # BLD AUTO: 0.55 K/UL
MONOCYTES NFR BLD AUTO: 10.4 %
NEUTROPHILS # BLD AUTO: 3.19 K/UL
NEUTROPHILS NFR BLD AUTO: 60.1 %
PLATELET # BLD AUTO: 349 K/UL
POTASSIUM SERPL-SCNC: 4.6 MMOL/L
PROT SERPL-MCNC: 6.8 G/DL
RBC # BLD: 4.7 M/UL
RBC # FLD: 13.8 %
SODIUM SERPL-SCNC: 138 MMOL/L
TRIGL SERPL-MCNC: 96 MG/DL
WBC # FLD AUTO: 5.31 K/UL

## 2019-12-23 ENCOUNTER — RX RENEWAL (OUTPATIENT)
Age: 84
End: 2019-12-23

## 2019-12-23 NOTE — ASSESSMENT
[FreeTextEntry1] : I have recommended to Obtain CT abdomen pelvis. If a CT abdomen pelvis is unremarkable, and she has no symptoms we will follow her clinically. If the diarrhea recurs, she will need a full colonoscopy. I am also recommending to increase the dose of mesalamine therapy.\par \par Elan Cobb MD\par Gastroenterology \par \par

## 2019-12-23 NOTE — HISTORY OF PRESENT ILLNESS
[de-identified] : The patient arrived for followup. She has segmental colitis. She is on budesonide and mesalamine.She underwent cardiac catheterization in August. After that one she was off budesonide, there was a recurrence for diarrhea. She was having discomfort in the right lower quadrant area.Then she called me and we started her on budesonide 9 mg and which has been tapered.She is only on mesalamine 2 tablets in the evening. She is having one to 2 bowel movements per day. She is not bleeding. However she is concerned with her condition.

## 2020-01-02 ENCOUNTER — FORM ENCOUNTER (OUTPATIENT)
Age: 85
End: 2020-01-02

## 2020-01-03 ENCOUNTER — APPOINTMENT (OUTPATIENT)
Dept: RADIOLOGY | Facility: CLINIC | Age: 85
End: 2020-01-03
Payer: MEDICARE

## 2020-01-03 ENCOUNTER — OUTPATIENT (OUTPATIENT)
Dept: OUTPATIENT SERVICES | Facility: HOSPITAL | Age: 85
LOS: 1 days | End: 2020-01-03
Payer: MEDICARE

## 2020-01-03 DIAGNOSIS — M25.551 PAIN IN RIGHT HIP: ICD-10-CM

## 2020-01-03 DIAGNOSIS — M25.552 PAIN IN RIGHT HIP: ICD-10-CM

## 2020-01-03 DIAGNOSIS — M51.36 OTHER INTERVERTEBRAL DISC DEGENERATION, LUMBAR REGION: ICD-10-CM

## 2020-01-03 PROCEDURE — 73521 X-RAY EXAM HIPS BI 2 VIEWS: CPT | Mod: 26

## 2020-01-03 PROCEDURE — 73521 X-RAY EXAM HIPS BI 2 VIEWS: CPT

## 2020-01-05 ENCOUNTER — FORM ENCOUNTER (OUTPATIENT)
Age: 85
End: 2020-01-05

## 2020-01-06 ENCOUNTER — OUTPATIENT (OUTPATIENT)
Dept: OUTPATIENT SERVICES | Facility: HOSPITAL | Age: 85
LOS: 1 days | End: 2020-01-06
Payer: MEDICARE

## 2020-01-06 ENCOUNTER — RESULT REVIEW (OUTPATIENT)
Age: 85
End: 2020-01-06

## 2020-01-06 ENCOUNTER — APPOINTMENT (OUTPATIENT)
Dept: RADIOLOGY | Facility: CLINIC | Age: 85
End: 2020-01-06
Payer: MEDICARE

## 2020-01-06 DIAGNOSIS — M51.36 OTHER INTERVERTEBRAL DISC DEGENERATION, LUMBAR REGION: ICD-10-CM

## 2020-01-06 PROCEDURE — 72100 X-RAY EXAM L-S SPINE 2/3 VWS: CPT

## 2020-01-06 PROCEDURE — 72100 X-RAY EXAM L-S SPINE 2/3 VWS: CPT | Mod: 26

## 2020-03-23 ENCOUNTER — RX RENEWAL (OUTPATIENT)
Age: 85
End: 2020-03-23

## 2020-06-02 ENCOUNTER — RX RENEWAL (OUTPATIENT)
Age: 85
End: 2020-06-02

## 2020-06-18 ENCOUNTER — APPOINTMENT (OUTPATIENT)
Dept: PULMONOLOGY | Facility: CLINIC | Age: 85
End: 2020-06-18
Payer: MEDICARE

## 2020-06-18 VITALS
SYSTOLIC BLOOD PRESSURE: 128 MMHG | WEIGHT: 170 LBS | DIASTOLIC BLOOD PRESSURE: 76 MMHG | OXYGEN SATURATION: 97 % | BODY MASS INDEX: 30.6 KG/M2 | HEART RATE: 93 BPM

## 2020-06-18 PROCEDURE — 99214 OFFICE O/P EST MOD 30 MIN: CPT

## 2020-06-18 RX ORDER — MESALAMINE 1.2 G/1
1.2 TABLET, DELAYED RELEASE ORAL DAILY
Qty: 120 | Refills: 2 | Status: DISCONTINUED | COMMUNITY
Start: 2019-05-28 | End: 2020-06-18

## 2020-06-18 NOTE — DISCUSSION/SUMMARY
[FreeTextEntry1] : \par #1. Continue Advair 250/50 for mild asthma; attempts at lowering the dose resulted in increased symptoms so will continue current dose\par #2. Montelukast nightly\par #3. Ventolin as needed\par #4. Diet and exercise for weight loss\par #5. SOBOE is likely related to weight and deconditioning\par #6. F/u 6 months with PFTs\par #7. Had flu vaccine 9/2019\par #8. Reviewed risks of exposure and symptoms of Covid-19 virus, including how the virus is spread.\par \par Discussed the following risk-reducing strategies:\par -Wash hands with soap and water (proper technique reviewed) \par -Use alcohol based  when hand-washing is not an option\par -Maintain a social distance of at least 6 feet whenever possible\par -Avoid contact, especially hand shaking\par -Avoid touching eyes, nose, and mouth\par -Cover face/mouth when coughing or sneezing\par -Avoid close contact with sick people\par -Seek medical help if you develop a fever and/or respiratory symptoms (e.g. cough, chest pain, SOB)\par \par Patient's questions were answered and patient appears to understand these recommendations\par \par D/w Dr. Arana in the past

## 2020-06-18 NOTE — HISTORY OF PRESENT ILLNESS
[Mild Persistent] : mild persistent [Doing Well] : doing well [Well Controlled] : Well controlled [None] : None [Goals--Doing Well] : the patient is doing well with ~his/her~ asthma goals [Adherent] : the patient is adherent with ~his/her~ medication regimen [PFTs] : pulmonary function tests [FreeTextEntry1] : The patient presents for evaluation for her upcoming catheterization.\par She c/o increased SOBOE despite no change in her lung function and had cardiac w/u which was not diagnostic.

## 2020-06-18 NOTE — REVIEW OF SYSTEMS
[Hypertension] : ~T hypertension [Fever] : no fever [Chills] : no chills [Dry Eyes] : no dryness of the eyes [Eye Irritation] : no ~T irritation of the eyes [Nasal Congestion] : no nasal congestion [Postnasal Drip] : no postnasal drip [Sinus Problems] : no sinus problems [Epistaxis] : no nosebleeds [Cough] : no cough [Sputum] : not coughing up ~M sputum [Hemoptysis] : no hemoptysis [Pleuritic Pain] : no pleuritic pain [Dyspnea] : no dyspnea [Chest Tightness] : no chest tightness [Wheezing] : no wheezing [Chest Discomfort] : no chest discomfort [Murmurs] : no murmurs were heard [Dysrhythmia] : no dysrhythmia [Palpitations] : no palpitations [Edema] : ~T edema was not present [Itchy Eyes] : no itching of ~T the eyes [Hay Fever] : no hay fever [Reflux] : no reflux [Nausea] : no nausea [Vomiting] : no vomiting [Constipation] : no constipation [Diarrhea] : no diarrhea [Abdominal Pain] : no abdominal pain [Dysuria] : no dysuria [Fracture] : no fracture [Anemia] : no anemia [Trauma] : no ~T physical trauma [Dizziness] : no dizziness [Headache] : no headache [Syncope] : no fainting [Numbness] : no numbness [Paralysis] : no paralysis was seen [Seizures] : no seizures [Diabetes] : no diabetes mellitus [Depression] : no depression [Anxiety] : no anxiety [Snoring] : no snoring [Thyroid Problem] : no thyroid problem [Witnessed Apneas] : demonstrated no ~M apnea [Nonrestorative Sleep] : restorative sleep

## 2020-06-18 NOTE — CONSULT LETTER
[Consult Letter:] : I had the pleasure of evaluating your patient, [unfilled]. [Dear  ___] : Dear  [unfilled], [Please see my note below.] : Please see my note below. [Consult Closing:] : Thank you very much for allowing me to participate in the care of this patient.  If you have any questions, please do not hesitate to contact me. [Sincerely,] : Sincerely, [DrValeria  ___] : Dr. RODRÍGUEZ [DrValeria ___] : Dr. RODRÍGUEZ [Nikos Andrea MD] : Nikos Andrea MD [FreeTextEntry3] : Nikos Andrea MD, FCCP, D. ABSM\par Pulmonary and Sleep Medicine\par [facility] [place]\par

## 2020-06-18 NOTE — PHYSICAL EXAM
[Normal Appearance] : normal appearance [General Appearance - In No Acute Distress] : no acute distress [General Appearance - Well Developed] : well developed [Normal Conjunctiva] : the conjunctiva exhibited no abnormalities [Neck Appearance] : the appearance of the neck was normal [Murmurs] : no murmurs present [Heart Sounds] : normal S1 and S2 [Heart Rate And Rhythm] : heart rate and rhythm were normal [Edema] : no peripheral edema present [] : no respiratory distress [Respiration, Rhythm And Depth] : normal respiratory rhythm and effort [Abdomen Soft] : soft [Abdomen Tenderness] : non-tender [Abnormal Walk] : normal gait [Nail Clubbing] : no clubbing of the fingernails [Cyanosis, Localized] : no localized cyanosis [Oriented To Time, Place, And Person] : oriented to person, place, and time [No Focal Deficits] : no focal deficits [FreeTextEntry1] : No abnormalities

## 2020-06-22 ENCOUNTER — APPOINTMENT (OUTPATIENT)
Dept: INTERNAL MEDICINE | Facility: CLINIC | Age: 85
End: 2020-06-22
Payer: MEDICARE

## 2020-06-22 VITALS
HEIGHT: 62.5 IN | DIASTOLIC BLOOD PRESSURE: 80 MMHG | BODY MASS INDEX: 29.96 KG/M2 | OXYGEN SATURATION: 96 % | WEIGHT: 167 LBS | HEART RATE: 65 BPM | SYSTOLIC BLOOD PRESSURE: 125 MMHG

## 2020-06-22 DIAGNOSIS — R10.9 UNSPECIFIED ABDOMINAL PAIN: ICD-10-CM

## 2020-06-22 DIAGNOSIS — R19.7 DIARRHEA, UNSPECIFIED: ICD-10-CM

## 2020-06-22 DIAGNOSIS — K57.92 DIVERTICULITIS OF INTESTINE, PART UNSPECIFIED, W/OUT PERFORATION OR ABSCESS W/OUT BLEEDING: ICD-10-CM

## 2020-06-22 PROCEDURE — 99214 OFFICE O/P EST MOD 30 MIN: CPT | Mod: 25

## 2020-06-22 PROCEDURE — 36415 COLL VENOUS BLD VENIPUNCTURE: CPT

## 2020-06-23 LAB
ALBUMIN SERPL ELPH-MCNC: 4.4 G/DL
ALP BLD-CCNC: 66 U/L
ALT SERPL-CCNC: 19 U/L
ANION GAP SERPL CALC-SCNC: 12 MMOL/L
AST SERPL-CCNC: 20 U/L
BASOPHILS # BLD AUTO: 0.05 K/UL
BASOPHILS NFR BLD AUTO: 0.9 %
BILIRUB SERPL-MCNC: 0.2 MG/DL
BUN SERPL-MCNC: 21 MG/DL
CALCIUM SERPL-MCNC: 9.6 MG/DL
CHLORIDE SERPL-SCNC: 104 MMOL/L
CHOLEST SERPL-MCNC: 199 MG/DL
CHOLEST/HDLC SERPL: 3.3 RATIO
CO2 SERPL-SCNC: 22 MMOL/L
CREAT SERPL-MCNC: 0.62 MG/DL
EOSINOPHIL # BLD AUTO: 0.16 K/UL
EOSINOPHIL NFR BLD AUTO: 2.8 %
ESTIMATED AVERAGE GLUCOSE: 108 MG/DL
GLUCOSE SERPL-MCNC: 105 MG/DL
HBA1C MFR BLD HPLC: 5.4 %
HCT VFR BLD CALC: 43.1 %
HDLC SERPL-MCNC: 60 MG/DL
HGB BLD-MCNC: 13.6 G/DL
IMM GRANULOCYTES NFR BLD AUTO: 0.9 %
LDLC SERPL CALC-MCNC: 113 MG/DL
LYMPHOCYTES # BLD AUTO: 1.6 K/UL
LYMPHOCYTES NFR BLD AUTO: 27.7 %
MAN DIFF?: NORMAL
MCHC RBC-ENTMCNC: 29.2 PG
MCHC RBC-ENTMCNC: 31.6 GM/DL
MCV RBC AUTO: 92.5 FL
MONOCYTES # BLD AUTO: 0.55 K/UL
MONOCYTES NFR BLD AUTO: 9.5 %
NEUTROPHILS # BLD AUTO: 3.37 K/UL
NEUTROPHILS NFR BLD AUTO: 58.2 %
PLATELET # BLD AUTO: 342 K/UL
POTASSIUM SERPL-SCNC: 4.6 MMOL/L
PROT SERPL-MCNC: 6.9 G/DL
RBC # BLD: 4.66 M/UL
RBC # FLD: 13.5 %
SODIUM SERPL-SCNC: 138 MMOL/L
TRIGL SERPL-MCNC: 130 MG/DL
WBC # FLD AUTO: 5.78 K/UL

## 2020-06-25 NOTE — HISTORY OF PRESENT ILLNESS
[FreeTextEntry1] : Pt presents for followup on hypertension/hyperlipidemia/coronary artery disease. Patient is currently fasting for today's labs. Patient is currently on lisinopril/isradipine for her hypertension, on fish oil for her hyperlipidemia and metoprolol/ASA for coronary artery disease.\par -Patient complaining of chronic low back pain with radiation down both legs. Patient states chiropractor has always been able to help her in the past but lately it is not helping. Patient is taking Tylenol arthritis daily. Patient had x-rays done 1/2020 showing DDD, chiropractor recommended possibly MRI\par -Declines COVID ab testing\par

## 2020-06-25 NOTE — ASSESSMENT
[FreeTextEntry1] : Venipuncture done in our office, Labs sent out and further recommendations will be made based on lab results.MRI of the lumbar spine ordered, consider evaluation with Dr. Camarena/ pain management pending MRI results. Patient advised to continue present medications with diet/exercise and specialist followup. Patient will return to the office in 3-4 months for CP\par \par Dr. Calero was present in office building while I examined patient\par \par Shingrix d/w pt\par Mammogram-4/2019-Rx given for followup\par bone density is up-to-date by GYN-8/2019\par Colonoscopy was 2009, no need for follow up\par Sigmoidoscopy 11/2018\par  specialists which include..\par 1. cardiology-Dr. Arana \par 2. pulmonary-Dr. Andrea\par 3. dermatology-Dr. Del Valle\par 4. gynecology-Dr. Vaca\par 5. ophthalmology-\par 6..podiatry prn-Dr. Thomas\par 7. Orthopedic-Dr. Moreno\par 8.GI-Dr. Cobb\par 9.Chiro+\par HIV screening=declines\par hepatitis C screening=4/2018\par cath 9/2019=nonobstructive CAD\par echo 9/2019

## 2020-06-25 NOTE — PHYSICAL EXAM
[Respiration, Rhythm And Depth] : normal respiratory rhythm and effort [General Appearance - In No Acute Distress] : in no acute distress [] : no respiratory distress [Heart Rate And Rhythm] : heart rate was normal and rhythm regular [Auscultation Breath Sounds / Voice Sounds] : lungs were clear to auscultation bilaterally [Mood] : the mood was normal [Affect] : the affect was normal [de-identified] : +Reproducible low back pain with SLR +B/L

## 2020-06-26 LAB — CYTOLOGY CVX/VAG DOC THIN PREP: NORMAL

## 2020-06-28 ENCOUNTER — OUTPATIENT (OUTPATIENT)
Dept: OUTPATIENT SERVICES | Facility: HOSPITAL | Age: 85
LOS: 1 days | End: 2020-06-28
Payer: MEDICARE

## 2020-06-28 ENCOUNTER — RESULT REVIEW (OUTPATIENT)
Age: 85
End: 2020-06-28

## 2020-06-28 ENCOUNTER — APPOINTMENT (OUTPATIENT)
Dept: MRI IMAGING | Facility: CLINIC | Age: 85
End: 2020-06-28
Payer: MEDICARE

## 2020-06-28 DIAGNOSIS — M54.5 LOW BACK PAIN: ICD-10-CM

## 2020-06-28 PROCEDURE — 72148 MRI LUMBAR SPINE W/O DYE: CPT | Mod: 26

## 2020-06-28 PROCEDURE — 72148 MRI LUMBAR SPINE W/O DYE: CPT

## 2020-09-04 ENCOUNTER — APPOINTMENT (OUTPATIENT)
Dept: ORTHOPEDIC SURGERY | Facility: CLINIC | Age: 85
End: 2020-09-04
Payer: MEDICARE

## 2020-09-04 VITALS
HEIGHT: 63 IN | BODY MASS INDEX: 29.59 KG/M2 | DIASTOLIC BLOOD PRESSURE: 85 MMHG | HEART RATE: 64 BPM | SYSTOLIC BLOOD PRESSURE: 152 MMHG | WEIGHT: 167 LBS

## 2020-09-04 DIAGNOSIS — M51.36 OTHER INTERVERTEBRAL DISC DEGENERATION, LUMBAR REGION: ICD-10-CM
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
## 2020-09-04 DIAGNOSIS — M43.10 SPONDYLOLISTHESIS, SITE UNSPECIFIED: ICD-10-CM

## 2020-09-04 DIAGNOSIS — Z13.31 ENCOUNTER FOR SCREENING FOR DEPRESSION: ICD-10-CM

## 2020-09-04 PROCEDURE — 72100 X-RAY EXAM L-S SPINE 2/3 VWS: CPT

## 2020-09-04 PROCEDURE — 99204 OFFICE O/P NEW MOD 45 MIN: CPT

## 2020-09-04 NOTE — HISTORY OF PRESENT ILLNESS
[de-identified] : Very pleasant 84-year-old female presents for a nonoperative consultation/discussion with regard to chronic lumbar degenerative disc disease patient has a success for approximately 40 years with use of chiropractic care. Patient has no acute radiculopathy her ANDRIA questionnaire is negative. [Stable] : stable [5] : a maximum pain level of 5/10 [Intermit.] : ~He/She~ states the symptoms seem to be intermittent [Bending] : worsened by bending [Lifting] : worsened by lifting [Chiropractic] : relieved by chiropractic manipulation [Rest] : relieved by rest [Ataxia] : no ataxia [Incontinence] : no incontinence [Loss of Dexterity] : good dexterity [Urinary Ret.] : no urinary retention

## 2020-09-04 NOTE — PHYSICAL EXAM
[de-identified] : CONSTITUTIONAL: The patient is a very pleasant individual who is well-nourished and who appears stated age.\par PSYCHIATRIC: The patient is alert and oriented X 3 and in no apparent distress, and participates with orthopedic evaluation well.\par HEAD: Atraumatic and is nonsyndromic in appearance.\par EENT: No visible thyromegaly, EOMI.\par RESPIRATORY: Respiratory rate is regular, not dyspneic on examination.\par LYMPHATICS: There is no inguinal lymphadenopathy\par INTEGUMENTARY: Skin is clean, dry, and intact about the bilateral lower extremities and lumbar spine.\par VASCULAR: There is brisk capillary refill about the bilateral lower extremities.\par NEUROLOGIC: There are no pathologic reflexes. There is no decrease in sensation of the bilateral lower extremities on Wartenberg pinwheel/manual examination. Deep tendon reflexes are well maintained at 2+/4 of the bilateral lower extremities and are symmetric..\par MUSCULOSKELETAL: There is no visible muscular atrophy. Manual motor strength is well maintained in the bilateral lower extremities. Range of motion of lumbar spine is well maintained. The patient ambulates in a non-myelopathic manner. Negative tension sign and straight leg raise bilaterally. Quad extension, ankle dorsiflexion, EHL, plantar flexion, and ankle eversion are well preserved. Normal secondary orthopaedic exam of bilateral hips, greater trochanteric area, knees and ankles\par Very mild/minimal mechanically orientated low back pain [de-identified] : MRIs of a reviewed of the lumbar spine that shows age-appropriate lumbar degenerative disease at L4-5 and L5-S1. Is also a minimal grade 1 spondylolisthesis at L4-L5. X-rays taken also from [facility] demonstrates similar findings. No acute compression fractures.\par \par X-rays have been repeated on today's date the lumbar spine it shows again a grade 1 spondylolisthesis at L4-L5, no acute compression fractures

## 2020-09-04 NOTE — DISCUSSION/SUMMARY
[de-identified] : Patient presents for a consultation with regard to a long-standing history of lumbar degenerative disc disease spondylolisthesis and back pain patient's had approximately 40 year history of successful chiropractic care. She is also recently tried topical modalities as well as oral over-the-counter medications. She is currently asymptomatic and wishes to pursue this mode of treatment I think this is very reasonable for woman at the age of 84 with no red flags an MRI that shows degenerative disc disease and x-rays that show no compression fractures. Patient is to continue with conservative care chiropractic care etc. patient in followup on a p.r.n. basis

## 2020-10-05 ENCOUNTER — NON-APPOINTMENT (OUTPATIENT)
Age: 85
End: 2020-10-05

## 2020-10-05 ENCOUNTER — APPOINTMENT (OUTPATIENT)
Dept: INTERNAL MEDICINE | Facility: CLINIC | Age: 85
End: 2020-10-05
Payer: MEDICARE

## 2020-10-05 VITALS
HEART RATE: 78 BPM | RESPIRATION RATE: 16 BRPM | TEMPERATURE: 97 F | BODY MASS INDEX: 29.41 KG/M2 | HEIGHT: 63 IN | SYSTOLIC BLOOD PRESSURE: 122 MMHG | DIASTOLIC BLOOD PRESSURE: 70 MMHG | WEIGHT: 166 LBS

## 2020-10-05 DIAGNOSIS — Z11.59 ENCOUNTER FOR SCREENING FOR OTHER VIRAL DISEASES: ICD-10-CM

## 2020-10-05 DIAGNOSIS — D25.9 LEIOMYOMA OF UTERUS, UNSPECIFIED: ICD-10-CM

## 2020-10-05 PROCEDURE — G0442 ANNUAL ALCOHOL SCREEN 15 MIN: CPT | Mod: 59

## 2020-10-05 PROCEDURE — G0439: CPT | Mod: CS

## 2020-10-05 PROCEDURE — 36415 COLL VENOUS BLD VENIPUNCTURE: CPT

## 2020-10-05 PROCEDURE — 93000 ELECTROCARDIOGRAM COMPLETE: CPT | Mod: 59

## 2020-10-05 NOTE — COUNSELING
[Healthy eating counseling provided] : healthy eating [Decrease Portions] : Decrease food portions [Walking] : Walking [None] : None [Needs reinforcement, provided] : Patient needs reinforcement on understanding lifestyle changes and  the steps needed to achieve self management goals and reinforcement was provided [Potential consequences of obesity discussed] : Potential consequences of obesity discussed [Benefits of weight loss discussed] : Benefits of weight loss discussed [Encouraged to increase physical activity] : Encouraged to increase physical activity

## 2020-10-06 LAB
25(OH)D3 SERPL-MCNC: 62 NG/ML
ALBUMIN SERPL ELPH-MCNC: 4.4 G/DL
ALP BLD-CCNC: 82 U/L
ALT SERPL-CCNC: 18 U/L
ANION GAP SERPL CALC-SCNC: 12 MMOL/L
APPEARANCE: CLEAR
AST SERPL-CCNC: 21 U/L
BACTERIA UR CULT: NORMAL
BACTERIA: NEGATIVE
BASOPHILS # BLD AUTO: 0.09 K/UL
BASOPHILS NFR BLD AUTO: 1.6 %
BILIRUB SERPL-MCNC: 0.2 MG/DL
BILIRUBIN URINE: NEGATIVE
BLOOD URINE: NEGATIVE
BUN SERPL-MCNC: 18 MG/DL
CALCIUM SERPL-MCNC: 9.8 MG/DL
CANCER AG125 SERPL-ACNC: 13 U/ML
CHLORIDE SERPL-SCNC: 100 MMOL/L
CHOLEST SERPL-MCNC: 197 MG/DL
CHOLEST/HDLC SERPL: 3.4 RATIO
CO2 SERPL-SCNC: 25 MMOL/L
COLOR: YELLOW
CREAT SERPL-MCNC: 0.59 MG/DL
EOSINOPHIL # BLD AUTO: 0.98 K/UL
EOSINOPHIL NFR BLD AUTO: 16.9 %
ESTIMATED AVERAGE GLUCOSE: 111 MG/DL
GLUCOSE QUALITATIVE U: NEGATIVE
GLUCOSE SERPL-MCNC: 99 MG/DL
HBA1C MFR BLD HPLC: 5.5 %
HCT VFR BLD CALC: 45.7 %
HDLC SERPL-MCNC: 59 MG/DL
HGB BLD-MCNC: 14.4 G/DL
HYALINE CASTS: 1 /LPF
IMM GRANULOCYTES NFR BLD AUTO: 1 %
KETONES URINE: NEGATIVE
LDLC SERPL CALC-MCNC: 114 MG/DL
LEUKOCYTE ESTERASE URINE: NEGATIVE
LYMPHOCYTES # BLD AUTO: 1.38 K/UL
LYMPHOCYTES NFR BLD AUTO: 23.8 %
MAN DIFF?: NORMAL
MCHC RBC-ENTMCNC: 29.4 PG
MCHC RBC-ENTMCNC: 31.5 GM/DL
MCV RBC AUTO: 93.3 FL
MICROSCOPIC-UA: NORMAL
MONOCYTES # BLD AUTO: 0.52 K/UL
MONOCYTES NFR BLD AUTO: 9 %
NEUTROPHILS # BLD AUTO: 2.77 K/UL
NEUTROPHILS NFR BLD AUTO: 47.7 %
NITRITE URINE: NEGATIVE
PH URINE: 6
PLATELET # BLD AUTO: 388 K/UL
POTASSIUM SERPL-SCNC: 5.3 MMOL/L
PROT SERPL-MCNC: 7.1 G/DL
PROTEIN URINE: NEGATIVE
RBC # BLD: 4.9 M/UL
RBC # FLD: 13.3 %
RED BLOOD CELLS URINE: 0 /HPF
SARS-COV-2 IGG SERPL IA-ACNC: <3.8 AU/ML
SARS-COV-2 IGG SERPL QL IA: NEGATIVE
SODIUM SERPL-SCNC: 138 MMOL/L
SPECIFIC GRAVITY URINE: 1.01
SQUAMOUS EPITHELIAL CELLS: 1 /HPF
TRIGL SERPL-MCNC: 120 MG/DL
URATE SERPL-MCNC: 4.3 MG/DL
UROBILINOGEN URINE: NORMAL
WBC # FLD AUTO: 5.8 K/UL
WHITE BLOOD CELLS URINE: 1 /HPF

## 2020-10-13 NOTE — HISTORY OF PRESENT ILLNESS
[FreeTextEntry1] : 84-year-old female with history of asthma, mild, nonobstructive CAD, hypertension, hyperlipidemia and osteopenia presents for her yearly physical.\par history also includes prediabetes and hypertension.\par \par The patient has lumbar degenerative disc disease with recent MRI showing similar findings and back surgery recommending conservative treatment her\par \par The patient has 2 significant chronic medical issues.\par -Patient with chronic dyspnea on exertion for which she has had pulmonary evaluation which was fine with controlled asthma and thought it was secondary to cardiac issue.\par the patient saw cardiology with extensive workup September 2019 including normal echo and cardiac catheterization showing nonobstructive CAD with cardiology thinking was pulmonary.\par Patient symptoms are stable without any worsening and likely secondary to deconditioning.\par \par -Patient diagnosed with segmental colitis 2018 improved on mesalamine.\par Current on no treatment and symptoms are stable.\par The patient did have an episode of acute diverticulitis November 2019 treated by GI with resolution\par \par Patient's only issue is 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 The patient had a left breast biopsy, which was benign. July 2018.\par \par The patient had 2 recent issues;\par -recent evaluation with gYN with vaginal ultrasound stating post procedure she developed UTI and treated via urgent care Center with Ceftin air with resolution of symptoms.\par \par -issue with right first toenail for which the patient saw podiatry with an ingrown toenail.\par A few days later the patient developed acute pain at the right first toe joint and diagnosed with gout.\par she currently is on colchicine 0.6 mg b.i.d. for 3 days with improvement but not resolution.patient reevaluated with podiatry who was concerned for infection therefore prescribed omnicef which the patient has not yet taken.\par

## 2020-10-13 NOTE — HEALTH RISK ASSESSMENT
[Good] : ~his/her~ current health as good [Very Good] : ~his/her~  mood as very good [Yes] : Yes [Monthly or less (1 pt)] : Monthly or less (1 point) [1 or 2 (0 pts)] : 1 or 2 (0 points) [Never (0 pts)] : Never (0 points) [No] : In the past 12 months have you used drugs other than those required for medical reasons? No [No falls in past year] : Patient reported no falls in the past year [0] : 2) Feeling down, depressed, or hopeless: Not at all (0) [None] : None [Alone] : lives alone [Retired] : retired [College] : College [] :  [# Of Children ___] : has [unfilled] children [Sexually Active] : sexually active [Feels Safe at Home] : Feels safe at home [Fully functional (bathing, dressing, toileting, transferring, walking, feeding)] : Fully functional (bathing, dressing, toileting, transferring, walking, feeding) [Fully functional (using the telephone, shopping, preparing meals, housekeeping, doing laundry, using] : Fully functional and needs no help or supervision to perform IADLs (using the telephone, shopping, preparing meals, housekeeping, doing laundry, using transportation, managing medications and managing finances) [Smoke Detector] : smoke detector [Carbon Monoxide Detector] : carbon monoxide detector [Seat Belt] :  uses seat belt [Sunscreen] : uses sunscreen [Reviewed no changes] : Reviewed no changes [Discussed at today's visit] : Advance Directives Discussed at today's visit [Designated Healthcare Proxy] : Designated healthcare proxy [Name: ___] : Health Care Proxy's Name: [unfilled]  [] : No [Audit-CScore] : 1 [de-identified] : occ walking [de-identified] : done [Change in mental status noted] : No change in mental status noted [Reports changes in hearing] : Reports no changes in hearing [Reports changes in vision] : Reports no changes in vision [Reports changes in dental health] : Reports no changes in dental health [Guns at Home] : no guns at home [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

## 2020-10-13 NOTE — ASSESSMENT
[FreeTextEntry1] : 84-year-old female who is quite active and relatively stable with controlled hypertension and asthma\par \par Patient with chronic dyspnea on exertion which is relatively mild and not worsening.\par Negative cardiac including cardiac catheterization and pulmonary evaluations.\par Likely secondary to deconditioning.\par \par Colitis diagnosed 2018 currently stable without medication.\par Status post acute diverticulitis November 2019 with resolution.\par \par  Asthma is controlled with her Advair and Singulair and her GERD is controlled without meds.. \par \par acute inflammation right first toe MTPJ highly likely gallop with colchicine only b.i.d.\par Doubt infection.\par Recommend increasing colchicine to 0.6 mg t.i.d. for about 4 days.\par Patient told to call hilary 3-4 ways of her status.\par \par Patient is to continue present treatment.\par \par Cardiology followup as scheduled\par Mammography May 2019. Followup scheduled\par Bone density august 2019\par Colonoscopy 2009 without followup needed unless any issue. Sigmoidoscopy May 2018\par GYN \par \par Vaccines up to date including patient already having received influenza vaccine\par Shingrix discussed\par Venipuncture done today in the office\par Followup in 3 months and as needed\par \par

## 2020-11-30 ENCOUNTER — RX RENEWAL (OUTPATIENT)
Age: 85
End: 2020-11-30

## 2020-12-03 DIAGNOSIS — Z01.818 ENCOUNTER FOR OTHER PREPROCEDURAL EXAMINATION: ICD-10-CM

## 2020-12-05 ENCOUNTER — APPOINTMENT (OUTPATIENT)
Dept: DISASTER EMERGENCY | Facility: CLINIC | Age: 85
End: 2020-12-05

## 2020-12-06 LAB — SARS-COV-2 N GENE NPH QL NAA+PROBE: NOT DETECTED

## 2020-12-08 ENCOUNTER — APPOINTMENT (OUTPATIENT)
Dept: PULMONOLOGY | Facility: CLINIC | Age: 85
End: 2020-12-08
Payer: MEDICARE

## 2020-12-08 VITALS — BODY MASS INDEX: 29.96 KG/M2 | WEIGHT: 167 LBS | HEIGHT: 62.75 IN

## 2020-12-08 VITALS
SYSTOLIC BLOOD PRESSURE: 134 MMHG | RESPIRATION RATE: 16 BRPM | DIASTOLIC BLOOD PRESSURE: 80 MMHG | HEART RATE: 74 BPM | OXYGEN SATURATION: 96 %

## 2020-12-08 PROCEDURE — 94727 GAS DIL/WSHOT DETER LNG VOL: CPT

## 2020-12-08 PROCEDURE — 85018 HEMOGLOBIN: CPT | Mod: QW

## 2020-12-08 PROCEDURE — 99214 OFFICE O/P EST MOD 30 MIN: CPT | Mod: 25

## 2020-12-08 PROCEDURE — 94729 DIFFUSING CAPACITY: CPT

## 2020-12-08 PROCEDURE — 94010 BREATHING CAPACITY TEST: CPT

## 2020-12-08 NOTE — PHYSICAL EXAM
[General Appearance - Well Developed] : well developed [Normal Appearance] : normal appearance [General Appearance - In No Acute Distress] : no acute distress [Normal Conjunctiva] : the conjunctiva exhibited no abnormalities [Neck Appearance] : the appearance of the neck was normal [Heart Rate And Rhythm] : heart rate and rhythm were normal [Heart Sounds] : normal S1 and S2 [Murmurs] : no murmurs present [Edema] : no peripheral edema present [] : no respiratory distress [Respiration, Rhythm And Depth] : normal respiratory rhythm and effort [Abdomen Soft] : soft [Abdomen Tenderness] : non-tender [Abnormal Walk] : normal gait [Nail Clubbing] : no clubbing of the fingernails [Cyanosis, Localized] : no localized cyanosis [No Focal Deficits] : no focal deficits [Oriented To Time, Place, And Person] : oriented to person, place, and time [FreeTextEntry1] : No abnormalities

## 2020-12-08 NOTE — PROCEDURE
[FreeTextEntry1] : PFTs 7/25/18 - essentially normal and were near baseline.\par PFTs 9/12/19 - essentially normal with a mildly reduced diffusion capacity which corrected for alveolar volume; overall her lung function was without change\par Spirometry 10/21/19 - essentially normal\par PFTs 12/8/20 - essentially normal

## 2020-12-08 NOTE — REVIEW OF SYSTEMS
[Hypertension] : ~T hypertension [Fever] : no fever [Chills] : no chills [Dry Eyes] : no dryness of the eyes [Eye Irritation] : no ~T irritation of the eyes [Nasal Congestion] : no nasal congestion [Epistaxis] : no nosebleeds [Postnasal Drip] : no postnasal drip [Sinus Problems] : no sinus problems [Cough] : no cough [Sputum] : not coughing up ~M sputum [Hemoptysis] : no hemoptysis [Dyspnea] : no dyspnea [Chest Tightness] : no chest tightness [Pleuritic Pain] : no pleuritic pain [Wheezing] : no wheezing [Chest Discomfort] : no chest discomfort [Dysrhythmia] : no dysrhythmia [Murmurs] : no murmurs were heard [Palpitations] : no palpitations [Edema] : ~T edema was not present [Hay Fever] : no hay fever [Itchy Eyes] : no itching of ~T the eyes [Reflux] : no reflux [Nausea] : no nausea [Vomiting] : no vomiting [Constipation] : no constipation [Diarrhea] : no diarrhea [Abdominal Pain] : no abdominal pain [Dysuria] : no dysuria [Trauma] : no ~T physical trauma [Fracture] : no fracture [Anemia] : no anemia [Headache] : no headache [Dizziness] : no dizziness [Syncope] : no fainting [Numbness] : no numbness [Paralysis] : no paralysis was seen [Seizures] : no seizures [Depression] : no depression [Anxiety] : no anxiety [Diabetes] : no diabetes mellitus [Thyroid Problem] : no thyroid problem [Snoring] : no snoring [Witnessed Apneas] : demonstrated no ~M apnea [Nonrestorative Sleep] : restorative sleep

## 2020-12-08 NOTE — DISCUSSION/SUMMARY
[FreeTextEntry1] : \par #1. Continue Advair 250/50 for mild asthma; attempts at lowering the dose resulted in increased symptoms so will continue current dose\par #2. Montelukast nightly\par #3. Ventolin as needed\par #4. Diet and exercise for weight loss\par #5. SOBOE is likely related to weight and deconditioning\par #6. F/u 6 months with CXR for routine f/u\par #7. Had flu vaccine 8/2020\par #8. Reviewed risks of exposure and symptoms of Covid-19 virus, including how the virus is spread and precautions to avoid jesús virus.\par \par Patient's questions were answered and patient appears to understand these recommendations

## 2020-12-08 NOTE — CONSULT LETTER
[Dear  ___] : Dear  [unfilled], [Consult Letter:] : I had the pleasure of evaluating your patient, [unfilled]. [Please see my note below.] : Please see my note below. [Consult Closing:] : Thank you very much for allowing me to participate in the care of this patient.  If you have any questions, please do not hesitate to contact me. [Sincerely,] : Sincerely, [DrValeria  ___] : Dr. RODRÍGUEZ [DrValeria ___] : Dr. RODRÍGUEZ [Nikos Andrea MD] : Nikos Andrea MD [FreeTextEntry3] : Nikos Andrea MD, FCCP, D. ABSM\par Pulmonary and Sleep Medicine\par [facility] [place]\par

## 2021-01-11 ENCOUNTER — APPOINTMENT (OUTPATIENT)
Dept: INTERNAL MEDICINE | Facility: CLINIC | Age: 86
End: 2021-01-11
Payer: MEDICARE

## 2021-01-11 ENCOUNTER — NON-APPOINTMENT (OUTPATIENT)
Age: 86
End: 2021-01-11

## 2021-01-11 VITALS
TEMPERATURE: 97.4 F | OXYGEN SATURATION: 97 % | RESPIRATION RATE: 14 BRPM | SYSTOLIC BLOOD PRESSURE: 125 MMHG | HEIGHT: 62.75 IN | DIASTOLIC BLOOD PRESSURE: 75 MMHG | WEIGHT: 170 LBS | BODY MASS INDEX: 30.5 KG/M2 | HEART RATE: 74 BPM

## 2021-01-11 DIAGNOSIS — R42 DIZZINESS AND GIDDINESS: ICD-10-CM

## 2021-01-11 PROCEDURE — 36415 COLL VENOUS BLD VENIPUNCTURE: CPT

## 2021-01-11 PROCEDURE — 99214 OFFICE O/P EST MOD 30 MIN: CPT | Mod: 25

## 2021-01-11 NOTE — PHYSICAL EXAM
[General Appearance - In No Acute Distress] : in no acute distress [] : no respiratory distress [Respiration, Rhythm And Depth] : normal respiratory rhythm and effort [Auscultation Breath Sounds / Voice Sounds] : lungs were clear to auscultation bilaterally [Heart Rate And Rhythm] : heart rate was normal and rhythm regular [Affect] : the affect was normal [Mood] : the mood was normal [Normal Sclera/Conjunctiva] : normal sclera/conjunctiva [PERRL] : pupils equal round and reactive to light [EOMI] : extraocular movements intact [Normal Outer Ear/Nose] : the outer ears and nose were normal in appearance [Normal Oropharynx] : the oropharynx was normal [Normal TMs] : both tympanic membranes were normal [Normal Nasal Mucosa] : the nasal mucosa was normal [Supple] : supple [No Focal Deficits] : no focal deficits [de-identified] : +reproducible sx with lying pt down and moving head laterally

## 2021-01-11 NOTE — HISTORY OF PRESENT ILLNESS
[FreeTextEntry1] : Pt presents for followup on hypertension/hyperlipidemia/coronary artery disease. Patient is currently fasting for today's labs. Patient is currently on lisinopril/isradipine for her hypertension, managing her hyperlipidemia dietarily and on labetolo/ASA for coronary artery disease.\par -pt states for a few weeks "she has dizzy/spinny" and checked BP and it was high, saw cardio last week who changed metoprolol to labetolol and has pt sched for monitor, reports EKG as fine.Denies CP/palps/SOB/vision changes/vomitting, "maybe worse with head mvt"\par \par

## 2021-01-11 NOTE — ASSESSMENT
[FreeTextEntry1] : SX seem more vertigo in nature and not related to HTN, brain MRI ordered and referral for vestibular PT given, to followup with cardio as planned.Venipuncture done in our office, Labs sent out and further recommendations will be made based on lab results. Patient advised to continue present medications with diet/exercise and specialist followup. Patient will return to the office in 3-4 months\par \par \par Dr. Calero was present in office building while I examined patient\par \par \par Shingrix d/w pt\par Mammogram-12/2020\par bone density is up-to-date by GYN-8/2019\par Colonoscopy was 2009, no need for follow up\par Sigmoidoscopy 11/2018\par  specialists which include..\par 1. cardiology-Dr. Arana \par 2. pulmonary-Dr. Andrea\par 3. dermatology-Dr. Del Valle\par 4. gynecology-Dr. Vaca\par 5. ophthalmology-\par 6..podiatry prn-Dr. Thomas\par 7. Orthopedic-Dr. Moreno/Dr. Stein\par 8.GI-Dr. Cobb\par 9.Chiro+\par HIV screening=declines\par hepatitis C screening=4/2018\par cath 9/2019=nonobstructive CAD\par echo 9/2019

## 2021-01-12 ENCOUNTER — NON-APPOINTMENT (OUTPATIENT)
Age: 86
End: 2021-01-12

## 2021-01-12 LAB
ALBUMIN SERPL ELPH-MCNC: 4.4 G/DL
ALP BLD-CCNC: 69 U/L
ALT SERPL-CCNC: 18 U/L
ANION GAP SERPL CALC-SCNC: 11 MMOL/L
AST SERPL-CCNC: 16 U/L
BASOPHILS # BLD AUTO: 0.07 K/UL
BASOPHILS NFR BLD AUTO: 1.4 %
BILIRUB SERPL-MCNC: 0.2 MG/DL
BUN SERPL-MCNC: 16 MG/DL
CALCIUM SERPL-MCNC: 9.6 MG/DL
CHLORIDE SERPL-SCNC: 103 MMOL/L
CHOLEST SERPL-MCNC: 190 MG/DL
CO2 SERPL-SCNC: 24 MMOL/L
CREAT SERPL-MCNC: 0.7 MG/DL
EOSINOPHIL # BLD AUTO: 0.11 K/UL
EOSINOPHIL NFR BLD AUTO: 2.2 %
ESTIMATED AVERAGE GLUCOSE: 108 MG/DL
GLUCOSE SERPL-MCNC: 104 MG/DL
HBA1C MFR BLD HPLC: 5.4 %
HCT VFR BLD CALC: 39.5 %
HDLC SERPL-MCNC: 58 MG/DL
HGB BLD-MCNC: 12.7 G/DL
IMM GRANULOCYTES NFR BLD AUTO: 0.4 %
LDLC SERPL CALC-MCNC: 111 MG/DL
LYMPHOCYTES # BLD AUTO: 1.15 K/UL
LYMPHOCYTES NFR BLD AUTO: 22.6 %
MAN DIFF?: NORMAL
MCHC RBC-ENTMCNC: 29.5 PG
MCHC RBC-ENTMCNC: 32.2 GM/DL
MCV RBC AUTO: 91.9 FL
MONOCYTES # BLD AUTO: 0.43 K/UL
MONOCYTES NFR BLD AUTO: 8.5 %
NEUTROPHILS # BLD AUTO: 3.3 K/UL
NEUTROPHILS NFR BLD AUTO: 64.9 %
NONHDLC SERPL-MCNC: 132 MG/DL
PLATELET # BLD AUTO: 323 K/UL
POTASSIUM SERPL-SCNC: 4.8 MMOL/L
PROT SERPL-MCNC: 6.6 G/DL
RBC # BLD: 4.3 M/UL
RBC # FLD: 13.3 %
SODIUM SERPL-SCNC: 138 MMOL/L
TRIGL SERPL-MCNC: 107 MG/DL
TSH SERPL-ACNC: 3.33 UIU/ML
URATE SERPL-MCNC: 5 MG/DL
WBC # FLD AUTO: 5.08 K/UL

## 2021-01-15 ENCOUNTER — OUTPATIENT (OUTPATIENT)
Dept: OUTPATIENT SERVICES | Facility: HOSPITAL | Age: 86
LOS: 1 days | End: 2021-01-15
Payer: MEDICARE

## 2021-01-15 ENCOUNTER — RESULT REVIEW (OUTPATIENT)
Age: 86
End: 2021-01-15

## 2021-01-15 ENCOUNTER — APPOINTMENT (OUTPATIENT)
Dept: MRI IMAGING | Facility: CLINIC | Age: 86
End: 2021-01-15
Payer: MEDICARE

## 2021-01-15 ENCOUNTER — NON-APPOINTMENT (OUTPATIENT)
Age: 86
End: 2021-01-15

## 2021-01-15 DIAGNOSIS — R42 DIZZINESS AND GIDDINESS: ICD-10-CM

## 2021-01-15 PROCEDURE — 70551 MRI BRAIN STEM W/O DYE: CPT | Mod: 26,MH

## 2021-01-15 PROCEDURE — 70551 MRI BRAIN STEM W/O DYE: CPT

## 2021-01-19 ENCOUNTER — APPOINTMENT (OUTPATIENT)
Dept: NEUROSURGERY | Facility: CLINIC | Age: 86
End: 2021-01-19
Payer: MEDICARE

## 2021-01-19 VITALS
DIASTOLIC BLOOD PRESSURE: 98 MMHG | HEIGHT: 62 IN | HEART RATE: 66 BPM | TEMPERATURE: 98 F | OXYGEN SATURATION: 98 % | WEIGHT: 165 LBS | BODY MASS INDEX: 30.36 KG/M2 | SYSTOLIC BLOOD PRESSURE: 183 MMHG

## 2021-01-19 DIAGNOSIS — Q27.30 ARTERIOVENOUS MALFORMATION, SITE UNSPECIFIED: ICD-10-CM

## 2021-01-19 PROCEDURE — 99202 OFFICE O/P NEW SF 15 MIN: CPT

## 2021-01-19 NOTE — REVIEW OF SYSTEMS
[As Noted in HPI] : as noted in HPI [Dizziness] : dizziness [Tension Headache] : tension-type headaches [Difficulty Walking] : difficulty walking [Negative] : Heme/Lymph [Leg Weakness] : no leg weakness [Numbness] : no numbness [Tingling] : no tingling [Seizures] : no convulsions [Lightheadedness] : no lightheadedness

## 2021-01-19 NOTE — DATA REVIEWED
[de-identified] : EXAM: MR BRAIN\par \par \par PROCEDURE DATE: 01/15/2021\par \par \par \par INTERPRETATION: MR brain without gadolinium\par \par CLINICAL INFORMATION: Dizziness\par \par TECHNIQUE: Sagittal and axial T1-weighted images, axial FLAIR images, axial gradient echo and axial and high-resolution axial T2-weighted images and axial diffusion weighted images of the brain were obtained.\par \par FINDINGS: No prior similar studies are available for review.\par \par The brain demonstrates 7 mm lesion of decreased signal intensity on all pulse sequences within the posterior RIGHT sarah. On suite images, there is blooming artifact. This is suspicious for an occult cavernous angioma. A second smaller focus of hemosiderin is seen in the posterior RIGHT temporal subcortical white matter which may also reflect an occult cavernous angioma. Mild periventricular, moderate bifrontal and biparietal subcortical and pontine white matter ischemia is noted. No acute cerebral cortical infarct is found. No intracranial hemorrhage is recognized. No mass effect is found in the brain.\par \par The ventricles, sulci and basal cisterns appear unremarkable.\par \par The vertebral and internal carotid arteries demonstrate expected flow voids indicating their patency.\par \par The orbits are unremarkable. The paranasal sinuses are clear. The nasal cavity appears intact. The nasopharynx is symmetric. The central skull base and temporal bones are intact. The calvarium appears unremarkable.\par \par IMPRESSION: 7 mm lesion of decreased signal intensity on all pulse sequences within the posterior RIGHT sarah. On suite images, there is blooming artifact. This is suspicious for an occult cavernous angioma. A second smaller focus of hemosiderin is seen in the posterior RIGHT temporal subcortical white matter which may also reflect an occult cavernous angioma. Mild periventricular, moderate bifrontal and biparietal subcortical and pontine white matter ischemia is noted.\par \par \par \par

## 2021-01-19 NOTE — PHYSICAL EXAM
[General Appearance - Alert] : alert [General Appearance - In No Acute Distress] : in no acute distress [General Appearance - Well Nourished] : well nourished [General Appearance - Well Developed] : well developed [Oriented To Time, Place, And Person] : oriented to person, place, and time [Impaired Insight] : insight and judgment were intact [Affect] : the affect was normal [Mood] : the mood was normal [Person] : oriented to person [Place] : oriented to place [Time] : oriented to time [Short Term Intact] : short term memory intact [Concentration Intact] : normal concentrating ability [Fluency] : fluency intact [Comprehension] : comprehension intact [Cranial Nerves Optic (II)] : visual acuity intact bilaterally,  pupils equal round and reactive to light [Cranial Nerves Oculomotor (III)] : extraocular motion intact [Cranial Nerves Trigeminal (V)] : facial sensation intact symmetrically [Cranial Nerves Facial (VII)] : face symmetrical [Cranial Nerves Glossopharyngeal (IX)] : tongue and palate midline [Cranial Nerves Accessory (XI - Cranial And Spinal)] : head turning and shoulder shrug symmetric [Cranial Nerves Hypoglossal (XII)] : there was no tongue deviation with protrusion [Motor Tone] : muscle tone was normal in all four extremities [Motor Strength] : muscle strength was normal in all four extremities [Sensation Tactile Decrease] : light touch was intact [Sensation Pain / Temperature Decrease] : pain and temperature was intact [Abnormal Walk] : normal gait [Balance] : balance was intact [No Visual Abnormalities] : no visible abnormailities [Normal] : normal [Over the Past 2 Weeks, Have You Felt Down, Depressed, or Hopeless?] : 1.) Over the past 2 weeks, have you felt down, depressed, or hopeless? No [Over the Past 2 Weeks, Have You Felt Little Interest or Pleasure Doing Things?] : 2.) Over the past 2 weeks, have you felt little interest or pleasure doing things? No [Romberg's Sign] : Romberg's sign was negtive

## 2021-01-19 NOTE — REASON FOR VISIT
[New Patient Visit] : a new patient visit [Referred By: _________] : Patient was referred by ANNE [FreeTextEntry1] : brain lesion
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
## 2021-01-19 NOTE — ASSESSMENT
[FreeTextEntry1] : Ms. Rosa presents with above history and imaging.  She is neurologically intact. \par \par She will obtain an MRI brain w/wo contrast to further characterize the 7mm brain lesion.\par Vestibular therapy for dizziness.\par Cardiology followup \par  Continue BP medications as ordered to maintained BP <140/90. Continue statin therapy with an LDL goal of < 70 for secondary stroke prevention.\par f/u after imaging.\par Patient knows to call the office if there are any new or worsening symptoms.\par

## 2021-01-26 ENCOUNTER — APPOINTMENT (OUTPATIENT)
Dept: MRI IMAGING | Facility: CLINIC | Age: 86
End: 2021-01-26
Payer: MEDICARE

## 2021-01-26 ENCOUNTER — OUTPATIENT (OUTPATIENT)
Dept: OUTPATIENT SERVICES | Facility: HOSPITAL | Age: 86
LOS: 1 days | End: 2021-01-26
Payer: MEDICARE

## 2021-01-26 DIAGNOSIS — Q27.30 ARTERIOVENOUS MALFORMATION, SITE UNSPECIFIED: ICD-10-CM

## 2021-01-26 PROCEDURE — 70553 MRI BRAIN STEM W/O & W/DYE: CPT | Mod: 26,ME

## 2021-01-26 PROCEDURE — 70553 MRI BRAIN STEM W/O & W/DYE: CPT

## 2021-01-26 PROCEDURE — G1004: CPT

## 2021-01-26 PROCEDURE — A9585: CPT

## 2021-02-03 ENCOUNTER — APPOINTMENT (OUTPATIENT)
Dept: NEUROSURGERY | Facility: CLINIC | Age: 86
End: 2021-02-03
Payer: MEDICARE

## 2021-02-03 VITALS
WEIGHT: 165 LBS | BODY MASS INDEX: 30.36 KG/M2 | TEMPERATURE: 97.8 F | DIASTOLIC BLOOD PRESSURE: 78 MMHG | OXYGEN SATURATION: 95 % | SYSTOLIC BLOOD PRESSURE: 190 MMHG | HEIGHT: 62 IN | HEART RATE: 73 BPM

## 2021-02-03 PROCEDURE — 99213 OFFICE O/P EST LOW 20 MIN: CPT

## 2021-02-03 NOTE — PHYSICAL EXAM
[General Appearance - Alert] : alert [General Appearance - In No Acute Distress] : in no acute distress [General Appearance - Well Nourished] : well nourished [General Appearance - Well Developed] : well developed [Oriented To Time, Place, And Person] : oriented to person, place, and time [Impaired Insight] : insight and judgment were intact [Affect] : the affect was normal [Mood] : the mood was normal [Person] : oriented to person [Place] : oriented to place [Time] : oriented to time [Short Term Intact] : short term memory intact [Concentration Intact] : normal concentrating ability [Fluency] : fluency intact [Comprehension] : comprehension intact [Cranial Nerves Optic (II)] : visual acuity intact bilaterally,  pupils equal round and reactive to light [Cranial Nerves Oculomotor (III)] : extraocular motion intact [Cranial Nerves Trigeminal (V)] : facial sensation intact symmetrically [Cranial Nerves Facial (VII)] : face symmetrical [Cranial Nerves Glossopharyngeal (IX)] : tongue and palate midline [Cranial Nerves Accessory (XI - Cranial And Spinal)] : head turning and shoulder shrug symmetric [Cranial Nerves Hypoglossal (XII)] : there was no tongue deviation with protrusion [Motor Strength] : muscle strength was normal in all four extremities [Motor Tone] : muscle tone was normal in all four extremities [Sensation Tactile Decrease] : light touch was intact [Sensation Pain / Temperature Decrease] : pain and temperature was intact [Abnormal Walk] : normal gait [Balance] : balance was intact [No Visual Abnormalities] : no visible abnormailities [Normal] : normal [Over the Past 2 Weeks, Have You Felt Down, Depressed, or Hopeless?] : 1.) Over the past 2 weeks, have you felt down, depressed, or hopeless? No [Over the Past 2 Weeks, Have You Felt Little Interest or Pleasure Doing Things?] : 2.) Over the past 2 weeks, have you felt little interest or pleasure doing things? No [Romberg's Sign] : Romberg's sign was negtive

## 2021-02-03 NOTE — REVIEW OF SYSTEMS
[As Noted in HPI] : as noted in HPI [Dizziness] : dizziness [Tension Headache] : tension-type headaches [Negative] : Heme/Lymph

## 2021-02-03 NOTE — ASSESSMENT
[FreeTextEntry1] : Ms. Rosa presents with above history and imaging. She is neurologically intact. 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 4/2021\par Vestibular therapy for dizziness.\par Cardiology followup \par  Continue BP medications as ordered to maintained BP <140/90. Continue statin therapy with an LDL goal of < 70 for secondary stroke prevention.\par f/u after imaging.\par Patient knows to call the office if there are any new or worsening symptoms.\par  \par
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
## 2021-02-03 NOTE — DATA REVIEWED
[de-identified] : EXAM: MR BRAIN WAW IC\par \par \par PROCEDURE DATE: 01/26/2021\par \par \par \par INTERPRETATION: Exam Date: 1/26/2021 1:38 PM\par \par MR brain with and without gadolinium\par \par CLINICAL INFORMATION: LESION OF THE RIGHT AVRIL REGION\par \par TECHNIQUE: Multiplanar imaging of the brain was performed pre- and post-IV contrast. 7.5 cc of Gadavist was administered. 0 cc was discarded.\par \par COMPARISON: MRI brain 1/15/2021\par \par FINDINGS:\par \par The ventricles, sulci and basal cisterns appear unremarkable. There is a 10 x 8 mm focus of susceptibility signal within the right posterior avril without associated enhancement or surrounding vasogenic edema, differential includes a cavernoma, or a small chronic microhemorrhage secondary to hypertension, versus old hemorrhage from a capillary telangiectasia. There is an additional 4.5 mm focus of susceptibility signal within the right posterior parietal lobe without associated enhancement or abnormal T2/FLAIR hyperintensity, nonspecific, with similar differential. There is a 1.0 x 0.9 cm homogeneously enhancing lesion along the anterior falx, most suggestive of a very small meningioma. No surrounding vasogenic edema is identified. No acute infarct or hemorrhage is identified. There are numerous patchy and small foci of T2/FLAIR hyperintense signal in the periventricular and subcortical white matter of the bilateral cerebri, suggestive of mild to moderate chronic microvascular ischemic changes. There is no evidence of midline shift or herniation pattern. No mass effect is found in the brain.\par \par The vertebral and internal carotid arteries demonstrate expected flow voids indicating their patency.\par \par The orbits are unremarkable. The paranasal sinuses are clear. The nasal cavity appears intact. The nasopharynx is symmetric. The central skull base and petrous temporal bones are intact. The calvarium appears unremarkable.\par \par \par IMPRESSION:\par \par 10 x 8 mm focus of susceptibility signal within the right posterior avril without associated enhancement or surrounding vasogenic edema, differential includes a cavernoma, or a small chronic microhemorrhage secondary to hypertension, versus old hemorrhage from a capillary telangiectasia. There is an additional 4.5 mm focus of susceptibility signal within the right posterior parietal lobe without associated enhancement or abnormal T2/FLAIR hyperintensity, nonspecific, with similar differential.\par \par 1.0 x 0.9 cm homogeneously enhancing lesion along the anterior falx, most suggestive of a very small meningioma. No surrounding vasogenic edema is identified.\par \par Numerous patchy and small foci of T2/FLAIR hyperintense signal in the periventricular and subcortical white matter of the bilateral cerebri, suggestive of mild to moderate chronic microvascular ischemic changes.\par \par \par

## 2021-02-04 ENCOUNTER — NON-APPOINTMENT (OUTPATIENT)
Age: 86
End: 2021-02-04

## 2021-04-08 ENCOUNTER — OUTPATIENT (OUTPATIENT)
Dept: OUTPATIENT SERVICES | Facility: HOSPITAL | Age: 86
LOS: 1 days | End: 2021-04-08
Payer: MEDICARE

## 2021-04-08 ENCOUNTER — APPOINTMENT (OUTPATIENT)
Dept: MRI IMAGING | Facility: CLINIC | Age: 86
End: 2021-04-08
Payer: MEDICARE

## 2021-04-08 DIAGNOSIS — R42 DIZZINESS AND GIDDINESS: ICD-10-CM

## 2021-04-08 PROCEDURE — 70553 MRI BRAIN STEM W/O & W/DYE: CPT | Mod: 26,MG

## 2021-04-08 PROCEDURE — G1004: CPT

## 2021-04-08 PROCEDURE — A9585: CPT

## 2021-04-08 PROCEDURE — 70553 MRI BRAIN STEM W/O & W/DYE: CPT

## 2021-04-09 ENCOUNTER — NON-APPOINTMENT (OUTPATIENT)
Age: 86
End: 2021-04-09

## 2021-04-14 ENCOUNTER — APPOINTMENT (OUTPATIENT)
Dept: NEUROSURGERY | Facility: CLINIC | Age: 86
End: 2021-04-14
Payer: MEDICARE

## 2021-04-14 VITALS
DIASTOLIC BLOOD PRESSURE: 71 MMHG | HEART RATE: 62 BPM | HEIGHT: 62 IN | TEMPERATURE: 98.2 F | BODY MASS INDEX: 30.36 KG/M2 | OXYGEN SATURATION: 97 % | SYSTOLIC BLOOD PRESSURE: 132 MMHG | WEIGHT: 165 LBS

## 2021-04-14 PROCEDURE — 99213 OFFICE O/P EST LOW 20 MIN: CPT

## 2021-04-14 NOTE — PHYSICAL EXAM
[General Appearance - Alert] : alert [General Appearance - In No Acute Distress] : in no acute distress [General Appearance - Well Nourished] : well nourished [General Appearance - Well Developed] : well developed [Oriented To Time, Place, And Person] : oriented to person, place, and time [Impaired Insight] : insight and judgment were intact [Person] : oriented to person [Place] : oriented to place [Time] : oriented to time [Short Term Intact] : short term memory intact [Concentration Intact] : normal concentrating ability [Fluency] : fluency intact [Comprehension] : comprehension intact [Cranial Nerves Optic (II)] : visual acuity intact bilaterally,  pupils equal round and reactive to light [Cranial Nerves Oculomotor (III)] : extraocular motion intact [Cranial Nerves Trigeminal (V)] : facial sensation intact symmetrically [Cranial Nerves Facial (VII)] : face symmetrical [Cranial Nerves Glossopharyngeal (IX)] : tongue and palate midline [Cranial Nerves Accessory (XI - Cranial And Spinal)] : head turning and shoulder shrug symmetric [Cranial Nerves Hypoglossal (XII)] : there was no tongue deviation with protrusion [Motor Tone] : muscle tone was normal in all four extremities [Motor Strength] : muscle strength was normal in all four extremities [Sensation Tactile Decrease] : light touch was intact [Sensation Pain / Temperature Decrease] : pain and temperature was intact [Abnormal Walk] : normal gait [Balance] : balance was intact [No Visual Abnormalities] : no visible abnormailities [Normal] : normal [Over the Past 2 Weeks, Have You Felt Down, Depressed, or Hopeless?] : 1.) Over the past 2 weeks, have you felt down, depressed, or hopeless? No [Over the Past 2 Weeks, Have You Felt Little Interest or Pleasure Doing Things?] : 2.) Over the past 2 weeks, have you felt little interest or pleasure doing things? No [Romberg's Sign] : Romberg's sign was negtive

## 2021-04-14 NOTE — REVIEW OF SYSTEMS
[As Noted in HPI] : as noted in HPI [Dizziness] : dizziness [Negative] : Heme/Lymph [Feeling Tired] : not feeling tired [Numbness] : no numbness [Tingling] : no tingling [Seizures] : no convulsions [Tension Headache] : no tension-type headache

## 2021-04-14 NOTE — ASSESSMENT
[FreeTextEntry1] : Ms. oRsa presents with above history and imaging. She is neurologically intact. \par \par She will obtain an MRI brain w/wo contrast to further characterize and surveil  the 7mm brain lesion and incidental falx meningioma in 6 months. 10/2021\par Vestibular therapy for dizziness.\par Cardiology followup \par  Continue BP medications as ordered to maintained BP <140/90. Continue statin therapy with an LDL goal of < 70 for secondary stroke prevention.\par f/u after imaging.\par Patient knows to call the office if there are any new or worsening symptoms.\par  \par
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
## 2021-04-14 NOTE — DATA REVIEWED
[de-identified] : EXAM: MR BRAIN WAW IC\par \par \par PROCEDURE DATE: 04/08/2021\par \par \par \par INTERPRETATION: Clinical indication: Follow-up pontine lesion and meningioma.\par \par MRI of the brain was performed using sagittal T1, axial T1 T2 T2 FLAIR diffusion and susceptibility weighted sequence. The patient was injected with approximately 7 cc of Gadavist IV with 0.5 cc of contrast discarded. Sagittal coronal and axial T1-weighted sequences were performed.\par \par Parenchymal volume loss and chronic microvascular ischemic changes are again identified.\par \par Abnormal areas of susceptibility involving the right sarah as well as the right parietal subcortical region are again identified. Both these findings appear stable when compared with the prior exam and could be compatible with areas of old hemorrhage though the possibility of cavernous angiomas or areas of mineralization must be considered.\par \par Again seen is an extra-axial enhancing lesion involving the anterior hemispheric region. This finding measures approximately 1.0 x 0.9 cm and previously measured approximately 0.9 x 0.9 cm. This is likely compatible with a meningioma.\par \par The paranasal sinuses appear clear\par \par The patient is status post bilateral cataract surgery.\par \par IMPRESSION: Overall no significant change when allowing for differences in technique.\par \par \par \par

## 2021-04-20 ENCOUNTER — NON-APPOINTMENT (OUTPATIENT)
Age: 86
End: 2021-04-20

## 2021-04-21 ENCOUNTER — RX RENEWAL (OUTPATIENT)
Age: 86
End: 2021-04-21

## 2021-05-17 ENCOUNTER — APPOINTMENT (OUTPATIENT)
Dept: INTERNAL MEDICINE | Facility: CLINIC | Age: 86
End: 2021-05-17
Payer: MEDICARE

## 2021-05-17 VITALS
BODY MASS INDEX: 28.52 KG/M2 | RESPIRATION RATE: 11 BRPM | SYSTOLIC BLOOD PRESSURE: 134 MMHG | HEART RATE: 68 BPM | DIASTOLIC BLOOD PRESSURE: 70 MMHG | TEMPERATURE: 97.7 F | WEIGHT: 155 LBS | HEIGHT: 62 IN

## 2021-05-17 DIAGNOSIS — Z02.89 ENCOUNTER FOR OTHER ADMINISTRATIVE EXAMINATIONS: ICD-10-CM

## 2021-05-17 LAB
ALBUMIN SERPL ELPH-MCNC: 4.6 G/DL
ALP BLD-CCNC: 83 U/L
ALT SERPL-CCNC: 20 U/L
ANION GAP SERPL CALC-SCNC: 13 MMOL/L
AST SERPL-CCNC: 18 U/L
BASOPHILS # BLD AUTO: 0.08 K/UL
BASOPHILS NFR BLD AUTO: 1.1 %
BILIRUB SERPL-MCNC: 0.3 MG/DL
BUN SERPL-MCNC: 16 MG/DL
CALCIUM SERPL-MCNC: 9.9 MG/DL
CHLORIDE SERPL-SCNC: 96 MMOL/L
CHOLEST SERPL-MCNC: 190 MG/DL
CO2 SERPL-SCNC: 24 MMOL/L
CREAT SERPL-MCNC: 0.57 MG/DL
EOSINOPHIL # BLD AUTO: 0.23 K/UL
EOSINOPHIL NFR BLD AUTO: 3.2 %
ESTIMATED AVERAGE GLUCOSE: 114 MG/DL
GLUCOSE SERPL-MCNC: 105 MG/DL
HBA1C MFR BLD HPLC: 5.6 %
HCT VFR BLD CALC: 44.1 %
HDLC SERPL-MCNC: 67 MG/DL
HGB BLD-MCNC: 14 G/DL
IMM GRANULOCYTES NFR BLD AUTO: 0.8 %
LDLC SERPL CALC-MCNC: 98 MG/DL
LYMPHOCYTES # BLD AUTO: 1.7 K/UL
LYMPHOCYTES NFR BLD AUTO: 24 %
MAN DIFF?: NORMAL
MCHC RBC-ENTMCNC: 28.7 PG
MCHC RBC-ENTMCNC: 31.7 GM/DL
MCV RBC AUTO: 90.6 FL
MONOCYTES # BLD AUTO: 0.59 K/UL
MONOCYTES NFR BLD AUTO: 8.3 %
NEUTROPHILS # BLD AUTO: 4.42 K/UL
NEUTROPHILS NFR BLD AUTO: 62.6 %
NONHDLC SERPL-MCNC: 123 MG/DL
PLATELET # BLD AUTO: 348 K/UL
POTASSIUM SERPL-SCNC: 4.6 MMOL/L
PROT SERPL-MCNC: 7 G/DL
RBC # BLD: 4.87 M/UL
RBC # FLD: 13.6 %
SODIUM SERPL-SCNC: 134 MMOL/L
TRIGL SERPL-MCNC: 124 MG/DL
TSH SERPL-ACNC: 3.75 UIU/ML
URATE SERPL-MCNC: 4.6 MG/DL
WBC # FLD AUTO: 7.08 K/UL

## 2021-05-17 PROCEDURE — 99214 OFFICE O/P EST MOD 30 MIN: CPT | Mod: 25

## 2021-05-17 PROCEDURE — 36415 COLL VENOUS BLD VENIPUNCTURE: CPT

## 2021-05-17 NOTE — HISTORY OF PRESENT ILLNESS
[FreeTextEntry1] : Pt presents for followup on hypertension/hyperlipidemia/coronary artery disease. Patient is currently fasting for today's labs. Patient is currently on lisinopril/isradipine/HCTZ/bystolic for her hypertension, managing her hyperlipidemia dietarily and on ASA for coronary artery disease.\par -following with N/S for brain lesion/meningioma\par -req handicap parking to be completed\par -got covid vaccines\par \par

## 2021-05-17 NOTE — ASSESSMENT
[FreeTextEntry1] : Handicap parking papers competed and given.Venipuncture done in our office, Labs sent out and further recommendations will be made based on lab results. Patient advised to continue present medications with diet/exercise and specialist followup. Patient will return to the office in oct for CP\par \par Dr. Kim was present in office building while I examined patient\par \par \par Shingrix d/w pt, +got covid vaccines\par Mammogram-12/2020\par bone density is up-to-date by GYN-8/2019\par Colonoscopy was 2009, no need for follow up\par Sigmoidoscopy 11/2018\par  specialists which include..\par 1. cardiology-Dr. Arana \par 2. pulmonary-Dr. Andrea\par 3. dermatology-Dr. Del Valle\par 4. gynecology-Dr. Vaca\par 5. ophthalmology-\par 6..podiatry prn-Dr. Thomas\par 7. Orthopedic-Dr. Moreno/Dr. Stein\par 8.GI-Dr. Cobb\par 9.Chiro+\par 10.N/S-Dr. Shah\par HIV screening=declines\par hepatitis C screening=4/2018\par cath 9/2019=nonobstructive CAD\par echo 4/2021\par brain MRI via N/S

## 2021-05-18 ENCOUNTER — NON-APPOINTMENT (OUTPATIENT)
Age: 86
End: 2021-05-18

## 2021-05-22 ENCOUNTER — TRANSCRIPTION ENCOUNTER (OUTPATIENT)
Age: 86
End: 2021-05-22

## 2021-06-01 ENCOUNTER — RESULT REVIEW (OUTPATIENT)
Age: 86
End: 2021-06-01

## 2021-06-01 ENCOUNTER — OUTPATIENT (OUTPATIENT)
Dept: OUTPATIENT SERVICES | Facility: HOSPITAL | Age: 86
LOS: 1 days | End: 2021-06-01
Payer: MEDICARE

## 2021-06-01 ENCOUNTER — APPOINTMENT (OUTPATIENT)
Dept: RADIOLOGY | Facility: CLINIC | Age: 86
End: 2021-06-01
Payer: MEDICARE

## 2021-06-01 DIAGNOSIS — Z00.00 ENCOUNTER FOR GENERAL ADULT MEDICAL EXAMINATION WITHOUT ABNORMAL FINDINGS: ICD-10-CM

## 2021-06-01 PROCEDURE — 71046 X-RAY EXAM CHEST 2 VIEWS: CPT | Mod: 26

## 2021-06-01 PROCEDURE — 71046 X-RAY EXAM CHEST 2 VIEWS: CPT

## 2021-06-03 ENCOUNTER — NON-APPOINTMENT (OUTPATIENT)
Age: 86
End: 2021-06-03

## 2021-06-07 ENCOUNTER — NON-APPOINTMENT (OUTPATIENT)
Age: 86
End: 2021-06-07

## 2021-06-08 ENCOUNTER — APPOINTMENT (OUTPATIENT)
Dept: PULMONOLOGY | Facility: CLINIC | Age: 86
End: 2021-06-08
Payer: MEDICARE

## 2021-06-08 VITALS
BODY MASS INDEX: 30.91 KG/M2 | RESPIRATION RATE: 16 BRPM | DIASTOLIC BLOOD PRESSURE: 70 MMHG | HEART RATE: 86 BPM | WEIGHT: 169 LBS | SYSTOLIC BLOOD PRESSURE: 120 MMHG | OXYGEN SATURATION: 96 %

## 2021-06-08 PROCEDURE — 99214 OFFICE O/P EST MOD 30 MIN: CPT

## 2021-06-08 NOTE — CONSULT LETTER
[Dear  ___] : Dear  [unfilled], [Consult Letter:] : I had the pleasure of evaluating your patient, [unfilled]. [Please see my note below.] : Please see my note below. [Consult Closing:] : Thank you very much for allowing me to participate in the care of this patient.  If you have any questions, please do not hesitate to contact me. [Sincerely,] : Sincerely, [DrValeria  ___] : Dr. RODRÍGUEZ [DrValeria ___] : Dr. RODRÍGUEZ [Nikos Andrea MD] : Nikos Andrea MD [FreeTextEntry3] : Nikos Andrea MD, FCCP, D. ABSM\par Pulmonary and Sleep Medicine\par [facility] [place]\par

## 2021-06-08 NOTE — REVIEW OF SYSTEMS
[Hypertension] : ~T hypertension [Fever] : no fever [Chills] : no chills [Dry Eyes] : no dryness of the eyes [Eye Irritation] : no ~T irritation of the eyes [Nasal Congestion] : no nasal congestion [Epistaxis] : no nosebleeds [Postnasal Drip] : no postnasal drip [Sinus Problems] : no sinus problems [Cough] : no cough [Sputum] : not coughing up ~M sputum [Hemoptysis] : no hemoptysis [Dyspnea] : no dyspnea [Chest Tightness] : no chest tightness [Pleuritic Pain] : no pleuritic pain [Wheezing] : no wheezing [Chest Discomfort] : no chest discomfort [Dysrhythmia] : no dysrhythmia [Murmurs] : no murmurs were heard [Palpitations] : no palpitations [Edema] : ~T edema was not present [Hay Fever] : no hay fever [Itchy Eyes] : no itching of ~T the eyes [Reflux] : no reflux [Nausea] : no nausea [Vomiting] : no vomiting [Constipation] : no constipation [Diarrhea] : no diarrhea [Abdominal Pain] : no abdominal pain [Dysuria] : no dysuria [Trauma] : no ~T physical trauma [Anemia] : no anemia [Fracture] : no fracture [Headache] : no headache [Dizziness] : no dizziness [Syncope] : no fainting [Numbness] : no numbness [Paralysis] : no paralysis was seen [Seizures] : no seizures [Depression] : no depression [Anxiety] : no anxiety [Diabetes] : no diabetes mellitus [Thyroid Problem] : no thyroid problem [Snoring] : no snoring [Witnessed Apneas] : demonstrated no ~M apnea [Nonrestorative Sleep] : restorative sleep

## 2021-06-08 NOTE — DISCUSSION/SUMMARY
[FreeTextEntry1] : \par #1. Continue Advair 250/50 for mild asthma; attempts at lowering the dose resulted in increased symptoms so will continue current dose\par #2. Montelukast nightly\par #3. Ventolin as needed\par #4. Diet and exercise for weight loss\par #5. SOBOE is likely related to weight and deconditioning\par #6. CXR from 6/1/21 was clear\par #7. Had flu vaccine 8/2020; Pt had both Covid vaccines \par #8. F/u in 6 months with PFTs\par #9. Reviewed risks of exposure and symptoms of Covid-19 virus, including how the virus is spread and precautions to avoid jesús virus.\par \par Patient's questions were answered and patient appears to understand these recommendations

## 2021-08-16 ENCOUNTER — APPOINTMENT (OUTPATIENT)
Dept: INTERNAL MEDICINE | Facility: CLINIC | Age: 86
End: 2021-08-16
Payer: MEDICARE

## 2021-08-16 ENCOUNTER — RX RENEWAL (OUTPATIENT)
Age: 86
End: 2021-08-16

## 2021-08-16 VITALS
RESPIRATION RATE: 14 BRPM | BODY MASS INDEX: 30.91 KG/M2 | DIASTOLIC BLOOD PRESSURE: 72 MMHG | TEMPERATURE: 97.5 F | HEART RATE: 74 BPM | SYSTOLIC BLOOD PRESSURE: 130 MMHG | WEIGHT: 169 LBS

## 2021-08-16 VITALS
BODY MASS INDEX: 31.1 KG/M2 | TEMPERATURE: 97.5 F | HEART RATE: 74 BPM | WEIGHT: 169 LBS | OXYGEN SATURATION: 96 % | HEIGHT: 62 IN

## 2021-08-16 PROCEDURE — 99214 OFFICE O/P EST MOD 30 MIN: CPT | Mod: 25

## 2021-08-16 PROCEDURE — 36415 COLL VENOUS BLD VENIPUNCTURE: CPT

## 2021-08-16 RX ORDER — COLCHICINE 0.6 MG/1
0.6 TABLET ORAL 3 TIMES DAILY
Qty: 15 | Refills: 0 | Status: DISCONTINUED | COMMUNITY
Start: 2020-10-06 | End: 2021-08-16

## 2021-08-16 NOTE — ASSESSMENT
[FreeTextEntry1] : Venipuncture done in our office, Labs sent out.Urine sent out. Meclizine 12.5 mg t.i.d. p.r.n. #30 given, she is aware of sedation. Patient told to avoid offending movements. Patient will followup with her brain imaging in October per neurosurgery. Patient will all w/ persistence/worsening and return to the office as scheduled for regular followup\par \par Dr. Kim was present in office building while I examined patient\par

## 2021-08-16 NOTE — PHYSICAL EXAM
[No Acute Distress] : no acute distress [No Respiratory Distress] : no respiratory distress  [Clear to Auscultation] : lungs were clear to auscultation bilaterally [Normal Rate] : normal rate  [Regular Rhythm] : with a regular rhythm [Normal Affect] : the affect was normal [Normal Mood] : the mood was normal [Normal Sclera/Conjunctiva] : normal sclera/conjunctiva [PERRL] : pupils equal round and reactive to light [EOMI] : extraocular movements intact [Normal Outer Ear/Nose] : the outer ears and nose were normal in appearance [Normal Oropharynx] : the oropharynx was normal [Normal Nasal Mucosa] : the nasal mucosa was normal [Normal TMs] : both tympanic membranes were normal [Supple] : supple [Soft] : abdomen soft [Non Tender] : non-tender [No Focal Deficits] : no focal deficits [de-identified] : +Reproducible symptoms with having patient lie down and turning head to the left

## 2021-08-16 NOTE — HISTORY OF PRESENT ILLNESS
[FreeTextEntry8] : Patient presents complaining of dizziness for 3-4 days. Patient states symptoms are worse at night with rolling over and getting out of bed. Patient has mild associated nausea without vomiting. Patient denies chest pain/palpitations/dyspnea or any vision changes. Patient definitely feels symptoms are worse with movement. Patient states symptoms are about the same compared to when they started. Patient had brain imaging done by neurosurgeon in April, next recommended followup imaging is October

## 2021-08-18 ENCOUNTER — NON-APPOINTMENT (OUTPATIENT)
Age: 86
End: 2021-08-18

## 2021-08-18 LAB
ALBUMIN SERPL ELPH-MCNC: 4.3 G/DL
ALP BLD-CCNC: 68 U/L
ALT SERPL-CCNC: 17 U/L
ANION GAP SERPL CALC-SCNC: 13 MMOL/L
APPEARANCE: CLEAR
AST SERPL-CCNC: 16 U/L
BACTERIA UR CULT: NORMAL
BACTERIA: NEGATIVE
BASOPHILS # BLD AUTO: 0.07 K/UL
BASOPHILS NFR BLD AUTO: 1 %
BILIRUB SERPL-MCNC: <0.2 MG/DL
BILIRUBIN URINE: NEGATIVE
BLOOD URINE: NEGATIVE
BUN SERPL-MCNC: 22 MG/DL
CALCIUM OXALATE CRYSTALS: ABNORMAL
CALCIUM SERPL-MCNC: 9.6 MG/DL
CHLORIDE SERPL-SCNC: 99 MMOL/L
CO2 SERPL-SCNC: 22 MMOL/L
COLOR: NORMAL
CREAT SERPL-MCNC: 0.61 MG/DL
EOSINOPHIL # BLD AUTO: 0.09 K/UL
EOSINOPHIL NFR BLD AUTO: 1.3 %
GLUCOSE QUALITATIVE U: NEGATIVE
GLUCOSE SERPL-MCNC: 114 MG/DL
HCT VFR BLD CALC: 38.2 %
HGB BLD-MCNC: 12.6 G/DL
HYALINE CASTS: 1 /LPF
IMM GRANULOCYTES NFR BLD AUTO: 0.9 %
KETONES URINE: NEGATIVE
LEUKOCYTE ESTERASE URINE: ABNORMAL
LYMPHOCYTES # BLD AUTO: 0.92 K/UL
LYMPHOCYTES NFR BLD AUTO: 13.3 %
MAN DIFF?: NORMAL
MCHC RBC-ENTMCNC: 29.5 PG
MCHC RBC-ENTMCNC: 33 GM/DL
MCV RBC AUTO: 89.5 FL
MICROSCOPIC-UA: NORMAL
MONOCYTES # BLD AUTO: 0.5 K/UL
MONOCYTES NFR BLD AUTO: 7.2 %
NEUTROPHILS # BLD AUTO: 5.27 K/UL
NEUTROPHILS NFR BLD AUTO: 76.3 %
NITRITE URINE: NEGATIVE
PH URINE: 5.5
PLATELET # BLD AUTO: 300 K/UL
POTASSIUM SERPL-SCNC: 4.4 MMOL/L
PROT SERPL-MCNC: 6.7 G/DL
PROTEIN URINE: NEGATIVE
RBC # BLD: 4.27 M/UL
RBC # FLD: 14.2 %
RED BLOOD CELLS URINE: 4 /HPF
SODIUM SERPL-SCNC: 134 MMOL/L
SPECIFIC GRAVITY URINE: 1.02
SQUAMOUS EPITHELIAL CELLS: 2 /HPF
URINE COMMENTS: NORMAL
UROBILINOGEN URINE: NORMAL
WBC # FLD AUTO: 6.91 K/UL
WHITE BLOOD CELLS URINE: 20 /HPF

## 2021-08-26 ENCOUNTER — APPOINTMENT (OUTPATIENT)
Dept: INTERNAL MEDICINE | Facility: CLINIC | Age: 86
End: 2021-08-26
Payer: MEDICARE

## 2021-08-26 VITALS
HEART RATE: 71 BPM | BODY MASS INDEX: 30.55 KG/M2 | HEIGHT: 62 IN | WEIGHT: 166 LBS | RESPIRATION RATE: 16 BRPM | DIASTOLIC BLOOD PRESSURE: 70 MMHG | TEMPERATURE: 97.4 F | SYSTOLIC BLOOD PRESSURE: 125 MMHG

## 2021-08-26 DIAGNOSIS — R29.3 ABNORMAL POSTURE: ICD-10-CM

## 2021-08-26 DIAGNOSIS — R42 DIZZINESS AND GIDDINESS: ICD-10-CM

## 2021-08-26 DIAGNOSIS — Z87.19 PERSONAL HISTORY OF OTHER DISEASES OF THE DIGESTIVE SYSTEM: ICD-10-CM

## 2021-08-26 PROCEDURE — 36415 COLL VENOUS BLD VENIPUNCTURE: CPT

## 2021-08-26 PROCEDURE — 99214 OFFICE O/P EST MOD 30 MIN: CPT | Mod: 25

## 2021-08-26 NOTE — PHYSICAL EXAM
[No Acute Distress] : no acute distress [Well-Appearing] : well-appearing [Normal Sclera/Conjunctiva] : normal sclera/conjunctiva [No Respiratory Distress] : no respiratory distress  [No Accessory Muscle Use] : no accessory muscle use [Normal Rate] : normal rate  [Clear to Auscultation] : lungs were clear to auscultation bilaterally [Regular Rhythm] : with a regular rhythm [No Edema] : there was no peripheral edema [Soft] : abdomen soft [Non Tender] : non-tender [Non-distended] : non-distended [No Masses] : no abdominal mass palpated [No HSM] : no HSM [No Focal Deficits] : no focal deficits [Normal Gait] : normal gait [Normal Affect] : the affect was normal

## 2021-08-26 NOTE — REVIEW OF SYSTEMS
[Abdominal Pain] : no abdominal pain [Nausea] : no nausea [Constipation] : no constipation [Diarrhea] : diarrhea [Vomiting] : no vomiting [Heartburn] : no heartburn [Melena] : no melena [Headache] : headache [Dizziness] : dizziness [Fainting] : no fainting [Confusion] : no confusion [Memory Loss] : no memory loss [Unsteady Walking] : no ataxia [Negative] : Heme/Lymph

## 2021-08-26 NOTE — HISTORY OF PRESENT ILLNESS
[FreeTextEntry8] : The patient presents for an acute appointment or 2 issues.\par \par #1 patient complains of continued headache and dizziness/vertigo.\par The patient was here on August 16 with the same symptoms and her dizziness has improved but persists to some degree and mild headache.\par The patient does have a meningioma and brain abnormalities followed by neurosurgery. She had a followup brain MRI April 2021 with no changes and six-month followup recommended.\par Neurosurgical note on that visit stated patient had her mild headache and dizziness therefore patient was referred for vestibular therapy which she did not do.\par The patient wonders if her low sodium is causing her symptoms but in review her last sodium was slightly decreased at 134 and told her this would not cause her symptoms.\par \par #2 she complains of 6 weeks of daily diarrhea which she has had in the past and has seen gastroenterology at that time and diagnosed with colitis. Despite her daily diarrhea for 6 weeks she had not called gastroenterology.\par No abdominal pain, nausea, vomiting, fever chills or bloody stool.\par Her appetite is good.

## 2021-08-27 ENCOUNTER — NON-APPOINTMENT (OUTPATIENT)
Age: 86
End: 2021-08-27

## 2021-08-27 LAB
ALBUMIN SERPL ELPH-MCNC: 4.6 G/DL
ALP BLD-CCNC: 76 U/L
ALT SERPL-CCNC: 20 U/L
ANION GAP SERPL CALC-SCNC: 20 MMOL/L
AST SERPL-CCNC: 19 U/L
BASOPHILS # BLD AUTO: 0.07 K/UL
BASOPHILS NFR BLD AUTO: 0.9 %
BILIRUB SERPL-MCNC: 0.2 MG/DL
BUN SERPL-MCNC: 15 MG/DL
CALCIUM SERPL-MCNC: 9.9 MG/DL
CHLORIDE SERPL-SCNC: 97 MMOL/L
CO2 SERPL-SCNC: 19 MMOL/L
CREAT SERPL-MCNC: 0.58 MG/DL
EOSINOPHIL # BLD AUTO: 0.11 K/UL
EOSINOPHIL NFR BLD AUTO: 1.4 %
GLUCOSE SERPL-MCNC: 102 MG/DL
HCT VFR BLD CALC: 40 %
HGB BLD-MCNC: 13.3 G/DL
IMM GRANULOCYTES NFR BLD AUTO: 1 %
LYMPHOCYTES # BLD AUTO: 1.35 K/UL
LYMPHOCYTES NFR BLD AUTO: 17.2 %
MAGNESIUM SERPL-MCNC: 2.2 MG/DL
MAN DIFF?: NORMAL
MCHC RBC-ENTMCNC: 29.5 PG
MCHC RBC-ENTMCNC: 33.3 GM/DL
MCV RBC AUTO: 88.7 FL
MONOCYTES # BLD AUTO: 0.57 K/UL
MONOCYTES NFR BLD AUTO: 7.3 %
NEUTROPHILS # BLD AUTO: 5.66 K/UL
NEUTROPHILS NFR BLD AUTO: 72.2 %
PLATELET # BLD AUTO: 267 K/UL
POTASSIUM SERPL-SCNC: 4.6 MMOL/L
PROT SERPL-MCNC: 7 G/DL
RBC # BLD: 4.51 M/UL
RBC # FLD: 13.8 %
SODIUM SERPL-SCNC: 136 MMOL/L
WBC # FLD AUTO: 7.84 K/UL

## 2021-09-01 ENCOUNTER — APPOINTMENT (OUTPATIENT)
Dept: GASTROENTEROLOGY | Facility: CLINIC | Age: 86
End: 2021-09-01
Payer: MEDICARE

## 2021-09-01 PROCEDURE — 99442: CPT | Mod: 95

## 2021-09-01 NOTE — ASSESSMENT
[FreeTextEntry1] : I have recommended to check with the stool studies and the blood test.  If the stool studies are unremarkable, I will start her on trial of budesonide again.  May need to have a colonoscopy. I spent 15 minutes on the encounter\par \par Elan Cobb MD\par Gastroenterology \par \par

## 2021-09-01 NOTE — HISTORY OF PRESENT ILLNESS
[de-identified] : Due to COVID 19 pandemic, telephonic visit was scheduled to decrease any chance of exposure. 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  Lately she has been having complains of some diarrhea and loose stools.  She denies any recent antibiotics.  She also denies any rectal bleeding.  No new medications at this time.

## 2021-09-09 ENCOUNTER — OUTPATIENT (OUTPATIENT)
Dept: OUTPATIENT SERVICES | Facility: HOSPITAL | Age: 86
LOS: 1 days | End: 2021-09-09
Payer: MEDICARE

## 2021-09-09 ENCOUNTER — APPOINTMENT (OUTPATIENT)
Dept: MRI IMAGING | Facility: CLINIC | Age: 86
End: 2021-09-09
Payer: MEDICARE

## 2021-09-09 DIAGNOSIS — G93.9 DISORDER OF BRAIN, UNSPECIFIED: ICD-10-CM

## 2021-09-09 PROCEDURE — 70553 MRI BRAIN STEM W/O & W/DYE: CPT | Mod: 26,MH

## 2021-09-09 PROCEDURE — A9585: CPT

## 2021-09-09 PROCEDURE — 70553 MRI BRAIN STEM W/O & W/DYE: CPT

## 2021-09-16 ENCOUNTER — APPOINTMENT (OUTPATIENT)
Dept: NEUROSURGERY | Facility: CLINIC | Age: 86
End: 2021-09-16
Payer: MEDICARE

## 2021-09-16 VITALS
BODY MASS INDEX: 30.55 KG/M2 | SYSTOLIC BLOOD PRESSURE: 129 MMHG | DIASTOLIC BLOOD PRESSURE: 73 MMHG | HEART RATE: 64 BPM | HEIGHT: 62 IN | WEIGHT: 166 LBS | OXYGEN SATURATION: 96 % | TEMPERATURE: 97.9 F

## 2021-09-16 PROCEDURE — 99213 OFFICE O/P EST LOW 20 MIN: CPT

## 2021-09-16 NOTE — DATA REVIEWED
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 Follow-up, continued headache and dizziness with vertigo\par \par TECHNIQUE: Multiplanar imaging was performed using T1 weighted, T2 weighted and FLAIR sequences. Diffusion weighted and SWI images were also obtained. Following intravenous gadolinium, multiplanar T1 weighted images were performed. 7.5 cc Gadavist were administered. 0 cc were discarded.\par \par COMPARISON EXAMINATION: 4/8/2021\par \par FINDINGS:\par VENTRICLES AND SULCI: Age-appropriate involutional changes as seen on the prior study..\par INTRA-AXIAL: At the level of the sarah evidence of a lesion that has low signal T2 with lower signal T1 without surrounding edema or mass effect may represent a focus of prior hemorrhage versus a cavernous malformation. No associated enhancement. Second focus in the right lateral temporal parietal region also may represent a second cavernous malformation versus a prior hemorrhage possibly on the basis of amyloid as a consideration or hypertension. 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

## 2021-09-16 NOTE — ASSESSMENT
[FreeTextEntry1] : Ms. Rosa presents with above history and imaging. She is neurologically intact.  I have personally reviewed her MRI head w/wo contrast when compared to 4/2021 does not reveal any significant change.  I believe it is unlikely the cause of her vertigo. 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 9/2022\par Maintain good hydration. \par Vestibular therapy for dizziness.\par ENT specialist referral suggested but she declined. \par Cardiology followup \par  Continue BP medications as ordered to maintained BP <140/90. Continue statin therapy with an LDL goal of < 70 for secondary stroke prevention.\par f/u after imaging.\par Patient has been given an opportunity to ask and have their questions answered to their satisfaction.\par Patient knows to call the office if there are any new or worsening symptoms.\par  \par
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
## 2021-09-16 NOTE — PHYSICAL EXAM
[General Appearance - Alert] : alert [General Appearance - In No Acute Distress] : in no acute distress [General Appearance - Well Nourished] : well nourished [General Appearance - Well Developed] : well developed [Oriented To Time, Place, And Person] : oriented to person, place, and time [Impaired Insight] : insight and judgment were intact [Over the Past 2 Weeks, Have You Felt Down, Depressed, or Hopeless?] : 1.) Over the past 2 weeks, have you felt down, depressed, or hopeless? No [Over the Past 2 Weeks, Have You Felt Little Interest or Pleasure Doing Things?] : 2.) Over the past 2 weeks, have you felt little interest or pleasure doing things? No [Person] : oriented to person [Place] : oriented to place [Time] : oriented to time [Short Term Intact] : short term memory intact [Concentration Intact] : normal concentrating ability [Fluency] : fluency intact [Comprehension] : comprehension intact [Cranial Nerves Optic (II)] : visual acuity intact bilaterally,  pupils equal round and reactive to light [Cranial Nerves Oculomotor (III)] : extraocular motion intact [Cranial Nerves Trigeminal (V)] : facial sensation intact symmetrically [Cranial Nerves Facial (VII)] : face symmetrical [Cranial Nerves Glossopharyngeal (IX)] : tongue and palate midline [Cranial Nerves Accessory (XI - Cranial And Spinal)] : head turning and shoulder shrug symmetric [Motor Tone] : muscle tone was normal in all four extremities [Cranial Nerves Hypoglossal (XII)] : there was no tongue deviation with protrusion [Motor Strength] : muscle strength was normal in all four extremities [Sensation Pain / Temperature Decrease] : pain and temperature was intact [Sensation Tactile Decrease] : light touch was intact [Romberg's Sign] : Romberg's sign was negtive [Abnormal Walk] : normal gait [Balance] : balance was intact [No Visual Abnormalities] : no visible abnormailities [Normal] : normal

## 2021-09-16 NOTE — REVIEW OF SYSTEMS
[As Noted in HPI] : as noted in HPI [Seizures] : no convulsions [Dizziness] : dizziness [Vertigo] : vertigo [Tension Headache] : tension-type headaches [Difficulty Walking] : no difficulty walking [Negative] : Heme/Lymph

## 2021-09-19 LAB
CRP SERPL-MCNC: 8 MG/L
G LAMBLIA AG STL QL: NORMAL
GI PCR PANEL, STOOL: NORMAL
IGA SER QL IEP: 214 MG/DL
TTG IGA SER IA-ACNC: <1.2 U/ML
TTG IGA SER-ACNC: NEGATIVE
TTG IGG SER IA-ACNC: <1.2 U/ML
TTG IGG SER IA-ACNC: NEGATIVE

## 2021-09-22 LAB
BACTERIA STL CULT: NORMAL
C DIFF TOXIN B QL PCR REFLEX: NORMAL
FAT STL QN: NORMAL
FAT STL QN: NORMAL
GDH ANTIGEN: NOT DETECTED
TOXIN A AND B: NOT DETECTED

## 2021-10-21 ENCOUNTER — APPOINTMENT (OUTPATIENT)
Dept: RADIOLOGY | Facility: CLINIC | Age: 86
End: 2021-10-21

## 2021-10-21 ENCOUNTER — OUTPATIENT (OUTPATIENT)
Dept: OUTPATIENT SERVICES | Facility: HOSPITAL | Age: 86
LOS: 1 days | End: 2021-10-21
Payer: MEDICARE

## 2021-10-21 ENCOUNTER — APPOINTMENT (OUTPATIENT)
Dept: PULMONOLOGY | Facility: CLINIC | Age: 86
End: 2021-10-21
Payer: MEDICARE

## 2021-10-21 VITALS
RESPIRATION RATE: 16 BRPM | DIASTOLIC BLOOD PRESSURE: 80 MMHG | WEIGHT: 168 LBS | SYSTOLIC BLOOD PRESSURE: 128 MMHG | BODY MASS INDEX: 30.73 KG/M2 | OXYGEN SATURATION: 95 % | HEART RATE: 72 BPM

## 2021-10-21 DIAGNOSIS — R05.9 COUGH, UNSPECIFIED: ICD-10-CM

## 2021-10-21 PROCEDURE — 71046 X-RAY EXAM CHEST 2 VIEWS: CPT

## 2021-10-21 PROCEDURE — 99214 OFFICE O/P EST MOD 30 MIN: CPT

## 2021-10-21 PROCEDURE — 71046 X-RAY EXAM CHEST 2 VIEWS: CPT | Mod: 26

## 2021-10-21 NOTE — DISCUSSION/SUMMARY
[FreeTextEntry1] : \par #1. Continue Advair 250/50 for mild asthma; attempts at lowering the dose resulted in increased symptoms so will continue current dose\par #2. Montelukast nightly\par #3. Ventolin as needed\par #4. Diet and exercise for weight loss\par #5. SOBOE is likely related to weight and deconditioning\par #6. F/u 3 months with PFTs and CXR\par #7. Pt had both Covid vaccines \par #8. Medrol dose nish\par #9. Reviewed risks of exposure and symptoms of Covid-19 virus, including how the virus is spread and precautions to avoid jesús virus.\par \par Patient's questions were answered and patient appears to understand these recommendations

## 2021-10-21 NOTE — CONSULT LETTER
[Dear  ___] : Dear  [unfilled], [Consult Letter:] : I had the pleasure of evaluating your patient, [unfilled]. [Please see my note below.] : Please see my note below. [Consult Closing:] : Thank you very much for allowing me to participate in the care of this patient.  If you have any questions, please do not hesitate to contact me. [Sincerely,] : Sincerely, [DrValeria  ___] : Dr. RODRÍGUEZ [DrValeria ___] : Dr. RODRÍGUEZ [Nikos Andrea MD] : Nikos Andrea MD [FreeTextEntry3] : Nikos Andrea MD, FCCP, D. ABSM\par Pulmonary and Sleep Medicine\par [facility] [place]\par

## 2021-10-21 NOTE — REASON FOR VISIT
[Follow-Up] : a follow-up visit [Asthma] : asthma [Obesity] : obesity [TextBox_44] : Covid 19 infection

## 2021-10-21 NOTE — HISTORY OF PRESENT ILLNESS
[Mild Persistent] : mild persistent [Doing Well] : doing well [Well Controlled] : Well controlled [Adherent] : the patient is adherent with ~his/her~ medication regimen [Goals--Doing Well] : the patient is doing well with ~his/her~ asthma goals [PFTs] : pulmonary function tests [Follow-Up - Routine Clinic] : a routine clinic follow-up of [Excess Weight] : excess weight [Currently Experiencing] : The patient is currently experiencing symptoms. [Dyspnea] : dyspnea [Low Calorie Diet] : low calorie diet [Fair Compliance] : fair compliance with treatment [Fair Tolerance] : fair tolerance of treatment [Poor Symptom Control] : poor symptom control [Hypertension] : hypertension [High] : high [Low Calorie] : low calorie [Well Balanced Diet] : well balanced meals [None] : The patient does not exercise [FreeTextEntry1] : The patient presents for evaluation for her upcoming catheterization.\par She c/o increased SOBOE despite no change in her lung function and had cardiac w/u which was not diagnostic.\par Pt was + for Covid on 10/9/21 and had the Monoclonal Ab infusion when she felt URI symptoms. She was exposed from a friend. She reports resolution of fevers, H/a, body aches but mild residual cough remaining.

## 2021-10-24 NOTE — REVIEW OF SYSTEMS
[SOB on Exertion] : shortness of breath during exertion [Joint Pain] : joint pain [As Noted in HPI] : as noted in HPI [Negative] : Heme/Lymph

## 2021-10-24 NOTE — COUNSELING
[Potential consequences of obesity discussed] : Potential consequences of obesity discussed [Benefits of weight loss discussed] : Benefits of weight loss discussed [Encouraged to increase physical activity] : Encouraged to increase physical activity [Healthy eating counseling provided] : healthy eating [Decrease Portions] : Decrease food portions [Walking] : Walking [None] : None [Needs reinforcement, provided] : Patient needs reinforcement on understanding lifestyle changes and  the steps needed to achieve self management goals and reinforcement was provided

## 2021-10-25 ENCOUNTER — APPOINTMENT (OUTPATIENT)
Dept: INTERNAL MEDICINE | Facility: CLINIC | Age: 86
End: 2021-10-25
Payer: MEDICARE

## 2021-10-25 VITALS
TEMPERATURE: 98.2 F | HEIGHT: 62 IN | DIASTOLIC BLOOD PRESSURE: 80 MMHG | WEIGHT: 164 LBS | HEART RATE: 78 BPM | SYSTOLIC BLOOD PRESSURE: 125 MMHG | BODY MASS INDEX: 30.18 KG/M2 | RESPIRATION RATE: 16 BRPM

## 2021-10-25 DIAGNOSIS — M10.9 GOUT, UNSPECIFIED: ICD-10-CM

## 2021-10-25 PROCEDURE — 36415 COLL VENOUS BLD VENIPUNCTURE: CPT

## 2021-10-25 PROCEDURE — G0439: CPT

## 2021-10-25 PROCEDURE — G0444 DEPRESSION SCREEN ANNUAL: CPT

## 2021-10-25 RX ORDER — NEBIVOLOL HYDROCHLORIDE 20 MG/1
20 TABLET ORAL
Qty: 90 | Refills: 0 | Status: DISCONTINUED | COMMUNITY
Start: 2021-08-03

## 2021-10-25 RX ORDER — METHYLPREDNISOLONE 4 MG/1
4 TABLET ORAL
Qty: 1 | Refills: 1 | Status: DISCONTINUED | COMMUNITY
Start: 2021-10-21 | End: 2021-10-25

## 2021-10-25 RX ORDER — HYDROCHLOROTHIAZIDE 12.5 MG/1
12.5 TABLET ORAL
Qty: 90 | Refills: 0 | Status: DISCONTINUED | COMMUNITY
Start: 2021-08-20

## 2021-10-25 NOTE — ASSESSMENT
[FreeTextEntry1] : 86-year-old female who is quite active and relatively stable with controlled hypertension and asthma\par \par Patient with chronic dyspnea on exertion which is relatively mild and not worsening.\par Negative cardiac including cardiac catheterization and pulmonary evaluations.\par Likely secondary to deconditioning.\par \par Hypertension difficult to control now on multiple medications with good blood pressure.\par Patient told if she feels lightheaded she checked her blood pressure to see she is hypotensive.\par \par Status post COVID infection October 9, 2021 with monoclonal antibody infusion and resolution of symptoms.\par \par Colitis diagnosed 2018 currently stable without medication.\par Status post acute diverticulitis November 2019 with resolution.\par \par  Asthma is controlled with her Advair and Singulair and her GERD is controlled without meds.. \par \par Patient is to continue present treatment.\par \par Cardiology followup as scheduled\par Mammography December 2020\par Bone density august 2019\par Colonoscopy 2009 without followup needed unless any issue. Sigmoidoscopy May 2018\par GYN \par \par Vaccines up to date including patient already having received influenza and COVID vaccines\par Shingrix discussed\par Venipuncture done today in the office\par Followup in 3 months and as needed\par \par

## 2021-10-25 NOTE — PHYSICAL EXAM
[General Appearance - Alert] : alert [Sclera] : the sclera and conjunctiva were normal [General Appearance - In No Acute Distress] : in no acute distress [PERRL With Normal Accommodation] : pupils were equal in size, round, and reactive to light [Extraocular Movements] : extraocular movements were intact [Outer Ear] : the ears and nose were normal in appearance [Oropharynx] : the oropharynx was normal [Neck Appearance] : the appearance of the neck was normal [Neck Cervical Mass (___cm)] : no neck mass was observed [Jugular Venous Distention Increased] : there was no jugular-venous distention [Thyroid Nodule] : there were no palpable thyroid nodules [Thyroid Diffuse Enlargement] : the thyroid was not enlarged [Auscultation Breath Sounds / Voice Sounds] : lungs were clear to auscultation bilaterally [Heart Rate And Rhythm] : heart rate was normal and rhythm regular [Heart Sounds Gallop] : no gallops [Heart Sounds] : normal S1 and S2 [Murmurs] : no murmurs [Heart Sounds Pericardial Friction Rub] : no pericardial rub [Full Pulse] : the pedal pulses are present [Edema] : there was no peripheral edema [Bowel Sounds] : normal bowel sounds [Abdomen Soft] : soft [] : no hepato-splenomegaly [Abdomen Tenderness] : non-tender [Abdomen Mass (___ Cm)] : no abdominal mass palpated [Cervical Lymph Nodes Enlarged Posterior Bilaterally] : posterior cervical [Cervical Lymph Nodes Enlarged Anterior Bilaterally] : anterior cervical [Axillary Lymph Nodes Enlarged Bilaterally] : axillary [Supraclavicular Lymph Nodes Enlarged Bilaterally] : supraclavicular [Femoral Lymph Nodes Enlarged Bilaterally] : femoral [Inguinal Lymph Nodes Enlarged Bilaterally] : inguinal [No CVA Tenderness] : no ~M costovertebral angle tenderness [No Spinal Tenderness] : no spinal tenderness [Nail Clubbing] : no clubbing  or cyanosis of the fingernails [Abnormal Walk] : normal gait [Musculoskeletal - Swelling] : no joint swelling seen [Motor Tone] : muscle strength and tone were normal [Cranial Nerves] : cranial nerves 2-12 were intact [No Focal Deficits] : no focal deficits [Oriented To Time, Place, And Person] : oriented to person, place, and time [Impaired Insight] : insight and judgment were intact [Affect] : the affect was normal [FreeTextEntry1] : multiple nevi. follows with dermatology

## 2021-10-25 NOTE — HISTORY OF PRESENT ILLNESS
[FreeTextEntry1] : 86-year-old female with history of asthma, mild, nonobstructive CAD, hypertension, hyperlipidemia and osteopenia presents for her yearly physical.\par history also includes prediabetes and hypertension.\par \par The patient is following closely with cardiology and now is on multiple medications for her hypertension.There were times when the patient feels lightheaded but has not checked her blood pressure.\par \par  She had COVID infection October 9, 2021 and received a monoclonal antibody infusion. She has been vaccinated. She has a residual dry cough so pulmonary treated her with steroids with improvement.\par \par The patient has lumbar degenerative disc disease with recent MRI showing similar findings and back surgery recommending conservative treatment \par \par The patient has 2 significant chronic medical issues.\par -Patient with chronic dyspnea on exertion for which she has had pulmonary evaluation which was fine with controlled asthma and thought it was secondary to cardiac issue.\par The patient saw cardiology with extensive workup September 2019 including normal echo and cardiac catheterization showing nonobstructive CAD with cardiology thinking was pulmonary.\par Patient symptoms are stable without any worsening and likely secondary to deconditioning.\par \par -Patient diagnosed with segmental colitis 2018 improved on mesalamine.\par Current on no treatment and symptoms are stable.\par The patient did have an episode of acute diverticulitis November 2019 treated by GI with resolution\par \par Patient's only issue is 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

## 2021-10-25 NOTE — HEALTH RISK ASSESSMENT
[Good] : ~his/her~ current health as good [Very Good] : ~his/her~  mood as very good [Yes] : Yes [Monthly or less (1 pt)] : Monthly or less (1 point) [1 or 2 (0 pts)] : 1 or 2 (0 points) [Never (0 pts)] : Never (0 points) [No] : In the past 12 months have you used drugs other than those required for medical reasons? No [No falls in past year] : Patient reported no falls in the past year [0] : 2) Feeling down, depressed, or hopeless: Not at all (0) [None] : None [Alone] : lives alone [Retired] : retired [College] : College [] :  [# Of Children ___] : has [unfilled] children [Feels Safe at Home] : Feels safe at home [Sexually Active] : sexually active [Fully functional (bathing, dressing, toileting, transferring, walking, feeding)] : Fully functional (bathing, dressing, toileting, transferring, walking, feeding) [Fully functional (using the telephone, shopping, preparing meals, housekeeping, doing laundry, using] : Fully functional and needs no help or supervision to perform IADLs (using the telephone, shopping, preparing meals, housekeeping, doing laundry, using transportation, managing medications and managing finances) [Smoke Detector] : smoke detector [Carbon Monoxide Detector] : carbon monoxide detector [Sunscreen] : uses sunscreen [Seat Belt] :  uses seat belt [Discussed at today's visit] : Advance Directives Discussed at today's visit [Designated Healthcare Proxy] : Designated healthcare proxy [Name: ___] : Health Care Proxy's Name: [unfilled]  [Reviewed no changes] : Reviewed, no changes [] : No [Audit-CScore] : 1 [de-identified] : occ walking [de-identified] : done [[id number]] : 0 [Change in mental status noted] : No change in mental status noted [Reports changes in hearing] : Reports no changes in hearing [Reports changes in vision] : Reports no changes in vision [Reports changes in dental health] : Reports no changes in dental health [Guns at Home] : no guns at home [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

## 2021-10-25 NOTE — DATA REVIEWED
[FreeTextEntry1] : Echo April 2021 with normal LVEF with mild LVH and mild AR/TR\par \par cardiac PET scan April 2021 = negative

## 2021-10-26 ENCOUNTER — NON-APPOINTMENT (OUTPATIENT)
Age: 86
End: 2021-10-26

## 2021-10-26 LAB
25(OH)D3 SERPL-MCNC: 55.2 NG/ML
ALBUMIN SERPL ELPH-MCNC: 4.5 G/DL
ALP BLD-CCNC: 66 U/L
ALT SERPL-CCNC: 16 U/L
ANION GAP SERPL CALC-SCNC: 13 MMOL/L
APPEARANCE: CLEAR
AST SERPL-CCNC: 13 U/L
BACTERIA: NEGATIVE
BASOPHILS # BLD AUTO: 0.09 K/UL
BASOPHILS NFR BLD AUTO: 1.1 %
BILIRUB SERPL-MCNC: 0.4 MG/DL
BILIRUBIN URINE: NEGATIVE
BLOOD URINE: NEGATIVE
BUN SERPL-MCNC: 25 MG/DL
CALCIUM SERPL-MCNC: 9.8 MG/DL
CHLORIDE SERPL-SCNC: 96 MMOL/L
CHOLEST SERPL-MCNC: 216 MG/DL
CO2 SERPL-SCNC: 23 MMOL/L
COLOR: YELLOW
CREAT SERPL-MCNC: 0.62 MG/DL
EOSINOPHIL # BLD AUTO: 0.05 K/UL
EOSINOPHIL NFR BLD AUTO: 0.6 %
ESTIMATED AVERAGE GLUCOSE: 120 MG/DL
GLUCOSE QUALITATIVE U: NEGATIVE
GLUCOSE SERPL-MCNC: 96 MG/DL
HBA1C MFR BLD HPLC: 5.8 %
HCT VFR BLD CALC: 41.6 %
HDLC SERPL-MCNC: 66 MG/DL
HGB BLD-MCNC: 14.1 G/DL
HYALINE CASTS: 0 /LPF
IMM GRANULOCYTES NFR BLD AUTO: 1.1 %
KETONES URINE: NEGATIVE
LDLC SERPL CALC-MCNC: 123 MG/DL
LEUKOCYTE ESTERASE URINE: ABNORMAL
LYMPHOCYTES # BLD AUTO: 1.74 K/UL
LYMPHOCYTES NFR BLD AUTO: 21.5 %
MAN DIFF?: NORMAL
MCHC RBC-ENTMCNC: 29.9 PG
MCHC RBC-ENTMCNC: 33.9 GM/DL
MCV RBC AUTO: 88.1 FL
MICROSCOPIC-UA: NORMAL
MONOCYTES # BLD AUTO: 0.71 K/UL
MONOCYTES NFR BLD AUTO: 8.8 %
NEUTROPHILS # BLD AUTO: 5.42 K/UL
NEUTROPHILS NFR BLD AUTO: 66.9 %
NITRITE URINE: NEGATIVE
NONHDLC SERPL-MCNC: 149 MG/DL
PH URINE: 6
PLATELET # BLD AUTO: 397 K/UL
POTASSIUM SERPL-SCNC: 4.4 MMOL/L
PROT SERPL-MCNC: 7.1 G/DL
PROTEIN URINE: NEGATIVE
RBC # BLD: 4.72 M/UL
RBC # FLD: 13.3 %
RED BLOOD CELLS URINE: 2 /HPF
SODIUM SERPL-SCNC: 132 MMOL/L
SPECIFIC GRAVITY URINE: 1.02
SQUAMOUS EPITHELIAL CELLS: 7 /HPF
TRIGL SERPL-MCNC: 131 MG/DL
URATE SERPL-MCNC: 4.7 MG/DL
UROBILINOGEN URINE: NORMAL
WBC # FLD AUTO: 8.1 K/UL
WHITE BLOOD CELLS URINE: 19 /HPF

## 2021-11-05 ENCOUNTER — NON-APPOINTMENT (OUTPATIENT)
Age: 86
End: 2021-11-05

## 2021-11-10 ENCOUNTER — NON-APPOINTMENT (OUTPATIENT)
Age: 86
End: 2021-11-10

## 2021-11-10 ENCOUNTER — APPOINTMENT (OUTPATIENT)
Dept: CARDIOLOGY | Facility: CLINIC | Age: 86
End: 2021-11-10
Payer: MEDICARE

## 2021-11-10 VITALS — HEART RATE: 43 BPM | DIASTOLIC BLOOD PRESSURE: 82 MMHG | SYSTOLIC BLOOD PRESSURE: 144 MMHG

## 2021-11-10 VITALS
TEMPERATURE: 97.9 F | BODY MASS INDEX: 31.47 KG/M2 | HEIGHT: 62 IN | HEART RATE: 59 BPM | OXYGEN SATURATION: 96 % | WEIGHT: 171 LBS | DIASTOLIC BLOOD PRESSURE: 78 MMHG | SYSTOLIC BLOOD PRESSURE: 114 MMHG

## 2021-11-10 VITALS — DIASTOLIC BLOOD PRESSURE: 82 MMHG | HEART RATE: 58 BPM | SYSTOLIC BLOOD PRESSURE: 138 MMHG

## 2021-11-10 VITALS — SYSTOLIC BLOOD PRESSURE: 116 MMHG | DIASTOLIC BLOOD PRESSURE: 78 MMHG

## 2021-11-10 DIAGNOSIS — U07.1 COVID-19: ICD-10-CM

## 2021-11-10 DIAGNOSIS — I95.1 ORTHOSTATIC HYPOTENSION: ICD-10-CM

## 2021-11-10 DIAGNOSIS — Z87.898 PERSONAL HISTORY OF OTHER SPECIFIED CONDITIONS: ICD-10-CM

## 2021-11-10 PROCEDURE — 93000 ELECTROCARDIOGRAM COMPLETE: CPT

## 2021-11-10 PROCEDURE — 99205 OFFICE O/P NEW HI 60 MIN: CPT

## 2021-11-10 RX ORDER — ACETAMINOPHEN 500 MG/1
500 TABLET, COATED ORAL
Refills: 0 | Status: DISCONTINUED | COMMUNITY
Start: 2020-06-22 | End: 2021-11-10

## 2021-11-10 RX ORDER — MECLIZINE HYDROCHLORIDE 12.5 MG/1
12.5 TABLET ORAL 3 TIMES DAILY
Qty: 30 | Refills: 0 | Status: DISCONTINUED | COMMUNITY
Start: 2021-08-16 | End: 2021-11-10

## 2021-11-10 RX ORDER — TRAZODONE HYDROCHLORIDE 50 MG/1
50 TABLET ORAL
Qty: 30 | Refills: 0 | Status: DISCONTINUED | COMMUNITY
Start: 2021-11-03 | End: 2021-11-10

## 2021-11-10 RX ORDER — BUDESONIDE 3 MG/1
3 CAPSULE, COATED PELLETS ORAL
Qty: 189 | Refills: 0 | Status: DISCONTINUED | COMMUNITY
Start: 2021-09-19 | End: 2021-11-10

## 2021-11-10 NOTE — REVIEW OF SYSTEMS
[Negative] : Heme/Lymph [SOB] : no shortness of breath [Dyspnea on exertion] : not dyspnea during exertion [Chest Discomfort] : no chest discomfort [Lower Ext Edema] : no extremity edema [Leg Claudication] : no intermittent leg claudication [Palpitations] : no palpitations [Orthopnea] : no orthopnea [PND] : no PND [Syncope] : no syncope [FreeTextEntry5] : + dizziness

## 2021-11-10 NOTE — HISTORY OF PRESENT ILLNESS
[FreeTextEntry1] : labile blood pressure. orthostatic hypotension\par \par \par This is a 86 year old woman with history of labile blood pressure and orthostatic hypotension.\par she does have dyspnea on exertion and when she exerts her self too much she gets shortness of breathe .\par no chest pain./ no syncope. no palpations  she did have a near syncope epsiodes . \par she takes all the meds in the am.  and when she takes all tehmeds together she feel dizzy in the evening.\par she is also feeling insomnia. cannot sleep at night. she is takign ylenolol PM and melatonin. she has anxiety.\par \par she started on sleep medicine trazodone.  when she took the meds. she felt better and  good sleep.\par \par

## 2021-11-10 NOTE — DISCUSSION/SUMMARY
[Patient] : the patient [Risks] : risks [Benefits] : benefits [Alternatives] : alternatives [With Me] : with me [___ Week(s)] : in [unfilled] week(s) [FreeTextEntry1] : This is a 86 year old woman with hsitory of labile hypertension, orthostatic hypotension, here with dizziness and insomnia\par \par \par 1) labile blood pressure. orthostaic hypotension: orthostatic cehck. 24 hr BP monitor\par 2) dizziness:  carotid US: orthostaic checks. and 24 hr BP monitor. \par 3) near syncope:  Likely vasovagal or BP meds.  carotid US.  4 weeks event monitor. \par 4) Insomnia:  titraiting off bystolic as patient staes she does not like ti. and sleep study./ \par 5) hypertension : lisinopril and HCTz in am.  isrtaduipien morning and supper.  Bystolic at bedtime. D/c hydralzine.  24 hr BP monitor next week. \par Will order and review ECG for the above mentioned diagnosis/condition/symptoms

## 2021-11-10 NOTE — REASON FOR VISIT
[Symptom and Test Evaluation] : symptom and test evaluation [Hypertension] : hypertension [FreeTextEntry1] : labile blood pressure. orthostatic hypotension

## 2021-11-10 NOTE — CARDIOLOGY SUMMARY
[de-identified] : 11 10 2021  Sinus  Bradycardia \par -RSR(V1) -nondiagnostic. \par \par  NORMAL\par  [de-identified] : apr 2021: Normal LVEF.  mild TR and Mild MR.  [de-identified] : sept 2019: non obstrucitve coronary artery disease moderate lad disease.

## 2021-11-23 ENCOUNTER — APPOINTMENT (OUTPATIENT)
Dept: CARDIOLOGY | Facility: CLINIC | Age: 86
End: 2021-11-23
Payer: MEDICARE

## 2021-11-23 PROCEDURE — 93880 EXTRACRANIAL BILAT STUDY: CPT

## 2021-12-01 ENCOUNTER — APPOINTMENT (OUTPATIENT)
Dept: CARDIOLOGY | Facility: CLINIC | Age: 86
End: 2021-12-01
Payer: MEDICARE

## 2021-12-01 VITALS
BODY MASS INDEX: 31.1 KG/M2 | SYSTOLIC BLOOD PRESSURE: 136 MMHG | RESPIRATION RATE: 14 BRPM | OXYGEN SATURATION: 97 % | DIASTOLIC BLOOD PRESSURE: 80 MMHG | TEMPERATURE: 97.4 F | HEIGHT: 62 IN | HEART RATE: 73 BPM | WEIGHT: 169 LBS

## 2021-12-01 PROCEDURE — 99214 OFFICE O/P EST MOD 30 MIN: CPT

## 2021-12-08 ENCOUNTER — APPOINTMENT (OUTPATIENT)
Dept: PULMONOLOGY | Facility: CLINIC | Age: 86
End: 2021-12-08

## 2021-12-15 ENCOUNTER — OUTPATIENT (OUTPATIENT)
Dept: OUTPATIENT SERVICES | Facility: HOSPITAL | Age: 86
LOS: 1 days | End: 2021-12-15
Payer: MEDICARE

## 2021-12-15 ENCOUNTER — APPOINTMENT (OUTPATIENT)
Age: 86
End: 2021-12-15
Payer: MEDICARE

## 2021-12-15 DIAGNOSIS — G47.33 OBSTRUCTIVE SLEEP APNEA (ADULT) (PEDIATRIC): ICD-10-CM

## 2021-12-15 PROCEDURE — 95806 SLEEP STUDY UNATT&RESP EFFT: CPT

## 2021-12-15 PROCEDURE — G0399: CPT

## 2021-12-15 PROCEDURE — ZZZZZ: CPT

## 2021-12-27 ENCOUNTER — NON-APPOINTMENT (OUTPATIENT)
Age: 86
End: 2021-12-27

## 2021-12-28 ENCOUNTER — NON-APPOINTMENT (OUTPATIENT)
Age: 86
End: 2021-12-28

## 2022-01-03 ENCOUNTER — APPOINTMENT (OUTPATIENT)
Dept: DISASTER EMERGENCY | Facility: CLINIC | Age: 87
End: 2022-01-03

## 2022-01-06 ENCOUNTER — APPOINTMENT (OUTPATIENT)
Dept: PULMONOLOGY | Facility: CLINIC | Age: 87
End: 2022-01-06
Payer: MEDICARE

## 2022-01-06 VITALS — SYSTOLIC BLOOD PRESSURE: 130 MMHG | OXYGEN SATURATION: 96 % | DIASTOLIC BLOOD PRESSURE: 86 MMHG | HEART RATE: 65 BPM

## 2022-01-06 VITALS — BODY MASS INDEX: 30.36 KG/M2 | HEIGHT: 62 IN | WEIGHT: 165 LBS

## 2022-01-06 DIAGNOSIS — U07.1 COVID-19: ICD-10-CM

## 2022-01-06 PROCEDURE — 99215 OFFICE O/P EST HI 40 MIN: CPT | Mod: CS,25

## 2022-01-06 PROCEDURE — 94727 GAS DIL/WSHOT DETER LNG VOL: CPT

## 2022-01-06 PROCEDURE — 94010 BREATHING CAPACITY TEST: CPT

## 2022-01-06 PROCEDURE — 85018 HEMOGLOBIN: CPT | Mod: QW

## 2022-01-06 PROCEDURE — 94729 DIFFUSING CAPACITY: CPT

## 2022-01-06 NOTE — REASON FOR VISIT
[Follow-Up] : a follow-up visit [Asthma] : asthma [Obesity] : obesity [Sleep Apnea] : sleep apnea [TextBox_44] : Covid 19 infection

## 2022-01-06 NOTE — CONSULT LETTER
[Dear  ___] : Dear  [unfilled], [Consult Letter:] : I had the pleasure of evaluating your patient, [unfilled]. [Please see my note below.] : Please see my note below. [Consult Closing:] : Thank you very much for allowing me to participate in the care of this patient.  If you have any questions, please do not hesitate to contact me. [Sincerely,] : Sincerely, [FreeTextEntry3] : Nikos Andrea MD, FCCP, D. ABSM\par Pulmonary and Sleep Medicine\par [facility] [place]\par  [DrValeria  ___] : Dr. RODRÍGUEZ [DrValeria ___] : Dr. RODRÍGUEZ

## 2022-01-06 NOTE — HISTORY OF PRESENT ILLNESS
[Mild Persistent] : mild persistent [Doing Well] : doing well [Well Controlled] : Well controlled [Adherent] : the patient is adherent with ~his/her~ medication regimen [Goals--Doing Well] : the patient is doing well with ~his/her~ asthma goals [PFTs] : pulmonary function tests [Excess Weight] : excess weight [Currently Experiencing] : The patient is currently experiencing symptoms. [Dyspnea] : dyspnea [Low Calorie Diet] : low calorie diet [Fair Compliance] : fair compliance with treatment [Fair Tolerance] : fair tolerance of treatment [Poor Symptom Control] : poor symptom control [Hypertension] : hypertension [High] : high [Low Calorie] : low calorie [Well Balanced Diet] : well balanced meals [Follow-Up - Routine Clinic] : a routine clinic follow-up of [Excessive Daytime Sleepiness] : excessive daytime sleepiness [Unrefreshing Sleep] : unrefreshing sleep [Sleepy When Sedentary] : sleepy when sedentary [None] : The patient is not currently being treated for this problem [FreeTextEntry1] : The patient presents for evaluation for her upcoming catheterization.\par She c/o increased SOBOE despite no change in her lung function and had cardiac w/u which was not diagnostic.\par Pt was + for Covid on 10/9/21 and had the Monoclonal Ab infusion when she felt URI symptoms. She was exposed from a friend. She reports resolution of fevers, H/a, body aches but mild residual cough remaining.\par Pt had HST for poor sleep and difficult to control HTN, especially in AM. Pt with mild JOSE but does not want to proceed with therapy. The patient understands the risks of untreated JOSE including heart disease, strokes, hypertension, pulmonary hypertension, and motor vehicle accidents as well as the need for treatment and weight loss.

## 2022-01-06 NOTE — DISCUSSION/SUMMARY
[FreeTextEntry1] : \par #1. Continue Advair 250/50 for mild asthma; attempts at lowering the dose resulted in increased symptoms so will continue current dose\par #2. Montelukast nightly\par #3. Ventolin as needed\par #4. Diet and exercise for weight loss\par #5. SOBOE is likely related to weight and deconditioning\par #6. F/u 3 months \par #7. Pt had both Covid vaccines, Covid infection, booster and flu vaccines\par #8. Medrol dose nish as needed\par #9. Offered CPAP therapy for mild JOSE with an AHI of 7.2 on HST; The patient understands the risks of untreated JOSE including heart disease, strokes, hypertension, pulmonary hypertension, and motor vehicle accidents as well as the need for treatment and weight loss. \par #10. Reviewed risks of exposure and symptoms of Covid-19 virus, including how the virus is spread and precautions to avoid jesús virus.\par \par Patient's questions were answered and patient appears to understand these recommendations

## 2022-01-06 NOTE — PROCEDURE
[FreeTextEntry1] : PFTs 7/25/18 - essentially normal and were near baseline.\par PFTs 9/12/19 - essentially normal with a mildly reduced diffusion capacity which corrected for alveolar volume; overall her lung function was without change\par Spirometry 10/21/19 - essentially normal\par PFTs 12/8/20 - essentially normal\par PFTs 1/6/22 - essentially normal

## 2022-01-11 ENCOUNTER — APPOINTMENT (OUTPATIENT)
Dept: CARDIOLOGY | Facility: CLINIC | Age: 87
End: 2022-01-11
Payer: MEDICARE

## 2022-01-11 ENCOUNTER — NON-APPOINTMENT (OUTPATIENT)
Age: 87
End: 2022-01-11

## 2022-01-11 PROCEDURE — 93975 VASCULAR STUDY: CPT

## 2022-01-11 PROCEDURE — 76775 US EXAM ABDO BACK WALL LIM: CPT | Mod: 59

## 2022-01-18 ENCOUNTER — APPOINTMENT (OUTPATIENT)
Dept: CARDIOLOGY | Facility: CLINIC | Age: 87
End: 2022-01-18
Payer: MEDICARE

## 2022-01-18 ENCOUNTER — NON-APPOINTMENT (OUTPATIENT)
Age: 87
End: 2022-01-18

## 2022-01-18 VITALS
WEIGHT: 170 LBS | HEART RATE: 70 BPM | RESPIRATION RATE: 16 BRPM | OXYGEN SATURATION: 95 % | HEIGHT: 62 IN | SYSTOLIC BLOOD PRESSURE: 150 MMHG | TEMPERATURE: 98.1 F | BODY MASS INDEX: 31.28 KG/M2 | DIASTOLIC BLOOD PRESSURE: 79 MMHG

## 2022-01-18 DIAGNOSIS — I10 ESSENTIAL (PRIMARY) HYPERTENSION: ICD-10-CM

## 2022-01-18 DIAGNOSIS — G89.29 LOW BACK PAIN, UNSPECIFIED: ICD-10-CM

## 2022-01-18 DIAGNOSIS — M53.86 OTHER SPECIFIED DORSOPATHIES, LUMBAR REGION: ICD-10-CM

## 2022-01-18 DIAGNOSIS — M54.50 LOW BACK PAIN, UNSPECIFIED: ICD-10-CM

## 2022-01-18 PROCEDURE — 99215 OFFICE O/P EST HI 40 MIN: CPT

## 2022-01-18 PROCEDURE — 93000 ELECTROCARDIOGRAM COMPLETE: CPT

## 2022-01-20 NOTE — CARDIOLOGY SUMMARY
[de-identified] : 1 18 2022  Sinus  Rhythm \par -Left atrial enlargement. \par Poor r wave progression\par \par 11 10 2021  Sinus  Bradycardia \par -RSR(V1) -nondiagnostic. \par \par  NORMAL\par  [de-identified] : apr 2021: Normal LVEF.  mild TR and Mild MR.  [de-identified] : sept 2019: non obstrucitve coronary artery disease moderate lad disease.

## 2022-01-20 NOTE — HISTORY OF PRESENT ILLNESS
[FreeTextEntry1] : labile blood pressure. orthostatic hypotension\par \par \par HPI for today: : she has a lot of pain. right hip pain.  \par she is going to a chiropractor.  it hurts on the right leg.  no ches tpain. no dyspnea.  no syncoope. no palptiations.\par \par \par \par old note: This is a 86 year old woman with history of labile blood pressure and orthostatic hypotension.\par she does have dyspnea on exertion and when she exerts her self too much she gets shortness of breathe .\par no chest pain./ no syncope. no palpations  she did have a near syncope epsiodes . \par she takes all the meds in the am.  and when she takes all tehmeds together she feel dizzy in the evening.\par she is also feeling insomnia. cannot sleep at night. she is takign ylenolol PM and melatonin. she has anxiety.\par \par she started on sleep medicine trazodone.  when she took the meds. she felt better and  good sleep.\par \par

## 2022-01-20 NOTE — DISCUSSION/SUMMARY
[Patient] : the patient [Risks] : risks [Benefits] : benefits [Alternatives] : alternatives [With Me] : with me [___ Month(s)] : in [unfilled] month(s) [FreeTextEntry1] : This is a 86 year old woman with hsitory of labile hypertension, orthostatic hypotension, here with dizziness and insomnia\par \par \par 1) labile blood pressure \par 2) dizziness:  carotid US: \par 3) near syncope:  Likely vasovagal or BP meds.  carotid US.  4 weeks event monitor : unremarkable  \par 4) Insomnia:  titraiting off bystolic as patient staes she does not like ti. and sleep study./ \par 5) hypertension : lisinopril and HCTz in am.  isrtaduipien morning and supper.  Bystolic at bedtime. D/c hydralazine.   \par 6) Sciatica: Lyrica \par Will order and review ECG for the above mentioned diagnosis/condition/symptoms az

## 2022-02-04 ENCOUNTER — APPOINTMENT (OUTPATIENT)
Dept: MRI IMAGING | Facility: CLINIC | Age: 87
End: 2022-02-04
Payer: MEDICARE

## 2022-02-04 ENCOUNTER — OUTPATIENT (OUTPATIENT)
Dept: OUTPATIENT SERVICES | Facility: HOSPITAL | Age: 87
LOS: 1 days | End: 2022-02-04
Payer: MEDICARE

## 2022-02-04 DIAGNOSIS — Z00.00 ENCOUNTER FOR GENERAL ADULT MEDICAL EXAMINATION WITHOUT ABNORMAL FINDINGS: ICD-10-CM

## 2022-02-04 PROCEDURE — 73721 MRI JNT OF LWR EXTRE W/O DYE: CPT | Mod: 26,RT,MH

## 2022-02-04 PROCEDURE — 73721 MRI JNT OF LWR EXTRE W/O DYE: CPT | Mod: MH

## 2022-02-14 ENCOUNTER — APPOINTMENT (OUTPATIENT)
Dept: INTERNAL MEDICINE | Facility: CLINIC | Age: 87
End: 2022-02-14
Payer: MEDICARE

## 2022-02-14 VITALS
TEMPERATURE: 97.2 F | WEIGHT: 165 LBS | DIASTOLIC BLOOD PRESSURE: 75 MMHG | SYSTOLIC BLOOD PRESSURE: 122 MMHG | HEART RATE: 69 BPM | BODY MASS INDEX: 30.18 KG/M2 | RESPIRATION RATE: 13 BRPM

## 2022-02-14 PROCEDURE — 99214 OFFICE O/P EST MOD 30 MIN: CPT | Mod: 25

## 2022-02-14 PROCEDURE — 36415 COLL VENOUS BLD VENIPUNCTURE: CPT

## 2022-02-14 NOTE — ASSESSMENT
[FreeTextEntry1] : Venipuncture done in our office, Labs sent out and further recommendations will be made based on lab results. Patient advised to continue present medications with diet/exercise and specialist followup. Patient will return to the office in 4months\par \par (CMP/CBC/TSH/LIPID/A1C/uric acid sent, not appearing in note)\par \par \par Shingrix d/w pt, +got covid vaccines X3\par Mammogram-12/2020=rx given\par bone density via GYN-8/2019=told due "to d/w GYN"\par Colonoscopy was 2009, no need for follow up\par Sigmoidoscopy 11/2018\par  specialists which include..\par 1. cardiology-Dr. Arana \par 2. pulmonary-Dr. Andrea\par 3. dermatology-Dr. Del Valle\par 4. gynecology-Dr. Vaca\par 5. ophthalmology-Site MD\par 6..podiatry prn-Dr. Thomas\par 7. Orthopedic-Dr. Moreno\par 8.GI-Dr. Cobb\par 9.Chiro+\par 10.N/S-Dr. Shah\par hepatitis C screening=4/2018\par cath 9/2019=nonobstructive CAD\par echo 4/2021\par brain MRI via N/S

## 2022-02-14 NOTE — HISTORY OF PRESENT ILLNESS
[FreeTextEntry1] : Pt presents for followup on hypertension/hyperlipidemia/coronary artery disease. Patient is currently fasting for today's labs. Patient is currently on lisinopril/isradipine/HCTZhydralazine for her hypertension, managing her hyperlipidemia dietarily and on ASA for coronary artery disease.\par -following with N/S for brain lesion/meningioma\par -got covid vaccines X3\par \par

## 2022-02-15 ENCOUNTER — APPOINTMENT (OUTPATIENT)
Dept: CARDIOLOGY | Facility: CLINIC | Age: 87
End: 2022-02-15
Payer: MEDICARE

## 2022-02-15 VITALS
BODY MASS INDEX: 30.18 KG/M2 | DIASTOLIC BLOOD PRESSURE: 85 MMHG | OXYGEN SATURATION: 95 % | HEIGHT: 62 IN | RESPIRATION RATE: 16 BRPM | SYSTOLIC BLOOD PRESSURE: 148 MMHG | WEIGHT: 164 LBS | HEART RATE: 71 BPM

## 2022-02-15 VITALS — SYSTOLIC BLOOD PRESSURE: 112 MMHG | DIASTOLIC BLOOD PRESSURE: 60 MMHG | HEART RATE: 65 BPM

## 2022-02-15 VITALS — DIASTOLIC BLOOD PRESSURE: 60 MMHG | SYSTOLIC BLOOD PRESSURE: 118 MMHG

## 2022-02-15 LAB
ALBUMIN SERPL ELPH-MCNC: 4.3 G/DL
ALP BLD-CCNC: 76 U/L
ALT SERPL-CCNC: 18 U/L
ANION GAP SERPL CALC-SCNC: 14 MMOL/L
AST SERPL-CCNC: 17 U/L
BASOPHILS # BLD AUTO: 0.07 K/UL
BASOPHILS NFR BLD AUTO: 0.8 %
BILIRUB SERPL-MCNC: 0.2 MG/DL
BUN SERPL-MCNC: 26 MG/DL
CALCIUM SERPL-MCNC: 9.4 MG/DL
CHLORIDE SERPL-SCNC: 99 MMOL/L
CHOLEST SERPL-MCNC: 174 MG/DL
CO2 SERPL-SCNC: 23 MMOL/L
CREAT SERPL-MCNC: 0.59 MG/DL
EOSINOPHIL # BLD AUTO: 0.09 K/UL
EOSINOPHIL NFR BLD AUTO: 1.1 %
ESTIMATED AVERAGE GLUCOSE: 120 MG/DL
GLUCOSE SERPL-MCNC: 99 MG/DL
HBA1C MFR BLD HPLC: 5.8 %
HCT VFR BLD CALC: 40.8 %
HDLC SERPL-MCNC: 64 MG/DL
HGB BLD-MCNC: 13.3 G/DL
IMM GRANULOCYTES NFR BLD AUTO: 1 %
LDLC SERPL CALC-MCNC: 77 MG/DL
LYMPHOCYTES # BLD AUTO: 1.29 K/UL
LYMPHOCYTES NFR BLD AUTO: 15.4 %
MAN DIFF?: NORMAL
MCHC RBC-ENTMCNC: 29 PG
MCHC RBC-ENTMCNC: 32.6 GM/DL
MCV RBC AUTO: 88.9 FL
MONOCYTES # BLD AUTO: 0.69 K/UL
MONOCYTES NFR BLD AUTO: 8.3 %
NEUTROPHILS # BLD AUTO: 6.14 K/UL
NEUTROPHILS NFR BLD AUTO: 73.4 %
NONHDLC SERPL-MCNC: 110 MG/DL
PLATELET # BLD AUTO: 347 K/UL
POTASSIUM SERPL-SCNC: 4.7 MMOL/L
PROT SERPL-MCNC: 6.8 G/DL
RBC # BLD: 4.59 M/UL
RBC # FLD: 13.3 %
SODIUM SERPL-SCNC: 136 MMOL/L
TRIGL SERPL-MCNC: 166 MG/DL
TSH SERPL-ACNC: 2.41 UIU/ML
URATE SERPL-MCNC: 5.1 MG/DL
WBC # FLD AUTO: 8.36 K/UL

## 2022-02-15 PROCEDURE — 99214 OFFICE O/P EST MOD 30 MIN: CPT

## 2022-02-22 ENCOUNTER — NON-APPOINTMENT (OUTPATIENT)
Age: 87
End: 2022-02-22

## 2022-03-16 ENCOUNTER — NON-APPOINTMENT (OUTPATIENT)
Age: 87
End: 2022-03-16

## 2022-03-21 ENCOUNTER — RX RENEWAL (OUTPATIENT)
Age: 87
End: 2022-03-21

## 2022-04-11 PROBLEM — Z11.59 SCREENING FOR VIRAL DISEASE: Status: ACTIVE | Noted: 2020-10-05

## 2022-04-25 ENCOUNTER — RX RENEWAL (OUTPATIENT)
Age: 87
End: 2022-04-25

## 2022-04-27 ENCOUNTER — APPOINTMENT (OUTPATIENT)
Dept: PULMONOLOGY | Facility: CLINIC | Age: 87
End: 2022-04-27
Payer: MEDICARE

## 2022-04-27 VITALS
DIASTOLIC BLOOD PRESSURE: 66 MMHG | RESPIRATION RATE: 16 BRPM | WEIGHT: 169 LBS | OXYGEN SATURATION: 97 % | HEART RATE: 65 BPM | SYSTOLIC BLOOD PRESSURE: 110 MMHG | BODY MASS INDEX: 31.1 KG/M2 | HEIGHT: 62 IN

## 2022-04-27 PROCEDURE — 99214 OFFICE O/P EST MOD 30 MIN: CPT

## 2022-04-27 RX ORDER — PREGABALIN 100 MG/1
100 CAPSULE ORAL TWICE DAILY
Qty: 60 | Refills: 3 | Status: DISCONTINUED | COMMUNITY
Start: 2022-01-18 | End: 2022-04-27

## 2022-04-27 NOTE — HISTORY OF PRESENT ILLNESS
[Never] : never [Mild Persistent] : mild persistent [Doing Well] : doing well [Well Controlled] : Well controlled [Adherent] : the patient is adherent with ~his/her~ medication regimen [Goals--Doing Well] : the patient is doing well with ~his/her~ asthma goals [PFTs] : pulmonary function tests [Excess Weight] : excess weight [Currently Experiencing] : The patient is currently experiencing symptoms. [Dyspnea] : dyspnea [Low Calorie Diet] : low calorie diet [Fair Compliance] : fair compliance with treatment [Fair Tolerance] : fair tolerance of treatment [Poor Symptom Control] : poor symptom control [Hypertension] : hypertension [High] : high [Low Calorie] : low calorie [Well Balanced Diet] : well balanced meals [Follow-Up - Routine Clinic] : a routine clinic follow-up of [Excessive Daytime Sleepiness] : excessive daytime sleepiness [Unrefreshing Sleep] : unrefreshing sleep [Sleepy When Sedentary] : sleepy when sedentary [None] : The patient is not currently being treated for this problem [TextBox_4] : The patient presents for evaluation for her upcoming catheterization.\par She c/o increased SOBOE despite no change in her lung function and had cardiac w/u which was not diagnostic.\par Pt was + for Covid on 10/9/21 and had the Monoclonal Ab infusion when she felt URI symptoms, fevers, H/a, body aches, cough but now with resolution of symptoms.\par Pt had HST for poor sleep and difficult to control HTN, especially in AM. Pt with mild JOSE but does not want to proceed with therapy. The patient understands the risks of untreated JOSE including heart disease, strokes, hypertension, pulmonary hypertension, and motor vehicle accidents as well as the need for treatment and weight loss.

## 2022-04-27 NOTE — DISCUSSION/SUMMARY
[FreeTextEntry1] : \par #1. Continue Advair 250/50 for mild asthma; attempts at lowering the dose resulted in increased symptoms so will continue current dose\par #2. Montelukast nightly\par #3. Ventolin as needed\par #4. Diet and exercise for weight loss\par #5. SOBOE is likely related to weight and deconditioning\par #6. F/u 4 months \par #7. Pt had both Covid vaccines, Covid infection, booster and flu vaccines; recovered from prior Covid infection from 10/2021\par #8. Medrol dose nish as needed\par #9. 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 \par #10. Reviewed risks of exposure and symptoms of Covid-19 virus, including how the virus is spread and precautions to avoid jesús virus.\par \par The patient expressed understanding and agreement with the above recommendations/plan and accepts responsibility to be compliant with recommended testing, therapies, and f/u visits.\par All relevant questions and concerns were addressed.

## 2022-04-27 NOTE — REVIEW OF SYSTEMS
[SOB on Exertion] : sob on exertion [Edema] : edema [Back Pain] : back pain [Negative] : Endocrine [TextBox_94] : knee pain

## 2022-04-27 NOTE — PHYSICAL EXAM
[No Acute Distress] : no acute distress [Well Nourished] : well nourished [Well Developed] : well developed [Normal Appearance] : normal appearance [Supple] : supple [Normal Rate/Rhythm] : normal rate/rhythm [Normal S1, S2] : normal s1, s2 [No Murmurs] : no murmurs [No Resp Distress] : no resp distress [No Acc Muscle Use] : no acc muscle use [Normal Rhythm and Effort] : normal rhythm and effort [Clear to Auscultation Bilaterally] : clear to auscultation bilaterally [No Abnormalities] : no abnormalities [Benign] : benign [Not Tender] : not tender [Soft] : soft [No Clubbing] : no clubbing [No Focal Deficits] : no focal deficits [Oriented x3] : oriented x3 [TextBox_2] : Pt is obese [TextBox_105] : trace edema

## 2022-04-27 NOTE — CONSULT LETTER
[Dear  ___] : Dear  [unfilled], [Consult Letter:] : I had the pleasure of evaluating your patient, [unfilled]. [Please see my note below.] : Please see my note below. [Consult Closing:] : Thank you very much for allowing me to participate in the care of this patient.  If you have any questions, please do not hesitate to contact me. [Sincerely,] : Sincerely, [DrValeria  ___] : Dr. RODRÍGUEZ [DrValeria ___] : Dr. RODRÍGUEZ [FreeTextEntry3] : Nikos Andrea MD, FCCP, D. ABSM\par Pulmonary and Sleep Medicine\par [facility] [place]\par

## 2022-06-13 ENCOUNTER — APPOINTMENT (OUTPATIENT)
Dept: INTERNAL MEDICINE | Facility: CLINIC | Age: 87
End: 2022-06-13
Payer: MEDICARE

## 2022-06-13 VITALS
DIASTOLIC BLOOD PRESSURE: 80 MMHG | WEIGHT: 164 LBS | OXYGEN SATURATION: 96 % | HEART RATE: 73 BPM | RESPIRATION RATE: 13 BRPM | BODY MASS INDEX: 30.18 KG/M2 | SYSTOLIC BLOOD PRESSURE: 120 MMHG | HEIGHT: 62 IN

## 2022-06-13 PROCEDURE — 99214 OFFICE O/P EST MOD 30 MIN: CPT | Mod: 25

## 2022-06-13 PROCEDURE — 36415 COLL VENOUS BLD VENIPUNCTURE: CPT

## 2022-06-13 NOTE — ASSESSMENT
[FreeTextEntry1] : Venipuncture done in our office, Labs sent out and further recommendations will be made based on lab results. Patient advised to continue present medications with diet/exercise and specialist followup. Patient will return to the office in oct for CP\par \par \par Shingrix d/w pt, +got covid vaccines X4\par Mammogram-3/2022\par bone density via GYN-8/2019=told due "to d/w GYN"\par Colonoscopy was 2009, no need for follow up\par Sigmoidoscopy 11/2018\par  specialists which include..\par 1. cardiology-Dr. Arana \par 2. pulmonary-Dr. Andrea\par 3. dermatology-Dr. Del Valle\par 4. gynecology-Dr. Vaca\par 5. ophthalmology-Site MD\par 6..podiatry prn-Dr. Thomas\par 7. Orthopedic-Dr. Moreno\par 8.GI-Dr. Cobb\par 9.Chiro+\par 10.N/S-Dr. Shah\par hepatitis C screening=4/2018\par cath 9/2019=nonobstructive CAD\par echo 4/2021\par brain MRI via N/S

## 2022-06-13 NOTE — HISTORY OF PRESENT ILLNESS
[FreeTextEntry1] : Pt presents for followup on hypertension/hyperlipidemia/coronary artery disease. Patient is currently fasting for today's labs. Patient is currently on lisinopril/isradipine/HCTZhydralazine for her hypertension, managing her hyperlipidemia dietarily and on ASA for coronary artery disease.\par -following with N/S for brain lesion/meningioma\par -got covid vaccines X4\par \par

## 2022-06-14 ENCOUNTER — NON-APPOINTMENT (OUTPATIENT)
Age: 87
End: 2022-06-14

## 2022-06-14 LAB
ALBUMIN SERPL ELPH-MCNC: 4.3 G/DL
ALP BLD-CCNC: 72 U/L
ALT SERPL-CCNC: 17 U/L
ANION GAP SERPL CALC-SCNC: 11 MMOL/L
AST SERPL-CCNC: 14 U/L
BASOPHILS # BLD AUTO: 0.07 K/UL
BASOPHILS NFR BLD AUTO: 1.1 %
BILIRUB SERPL-MCNC: 0.2 MG/DL
BUN SERPL-MCNC: 20 MG/DL
CALCIUM SERPL-MCNC: 9.7 MG/DL
CHLORIDE SERPL-SCNC: 96 MMOL/L
CHOLEST SERPL-MCNC: 182 MG/DL
CO2 SERPL-SCNC: 27 MMOL/L
CREAT SERPL-MCNC: 0.69 MG/DL
EGFR: 84 ML/MIN/1.73M2
EOSINOPHIL # BLD AUTO: 0.07 K/UL
EOSINOPHIL NFR BLD AUTO: 1.1 %
ESTIMATED AVERAGE GLUCOSE: 114 MG/DL
GLUCOSE SERPL-MCNC: 99 MG/DL
HBA1C MFR BLD HPLC: 5.6 %
HCT VFR BLD CALC: 37.4 %
HDLC SERPL-MCNC: 64 MG/DL
HGB BLD-MCNC: 12.3 G/DL
IMM GRANULOCYTES NFR BLD AUTO: 1.4 %
LDLC SERPL CALC-MCNC: 98 MG/DL
LYMPHOCYTES # BLD AUTO: 1.18 K/UL
LYMPHOCYTES NFR BLD AUTO: 18 %
MAN DIFF?: NORMAL
MCHC RBC-ENTMCNC: 28.6 PG
MCHC RBC-ENTMCNC: 32.9 GM/DL
MCV RBC AUTO: 87 FL
MONOCYTES # BLD AUTO: 0.55 K/UL
MONOCYTES NFR BLD AUTO: 8.4 %
NEUTROPHILS # BLD AUTO: 4.59 K/UL
NEUTROPHILS NFR BLD AUTO: 70 %
NONHDLC SERPL-MCNC: 118 MG/DL
PLATELET # BLD AUTO: 362 K/UL
POTASSIUM SERPL-SCNC: 4.8 MMOL/L
PROT SERPL-MCNC: 6.7 G/DL
RBC # BLD: 4.3 M/UL
RBC # FLD: 13.7 %
SODIUM SERPL-SCNC: 134 MMOL/L
TRIGL SERPL-MCNC: 101 MG/DL
TSH SERPL-ACNC: 2.84 UIU/ML
URATE SERPL-MCNC: 4.5 MG/DL
WBC # FLD AUTO: 6.55 K/UL

## 2022-06-17 ENCOUNTER — APPOINTMENT (OUTPATIENT)
Dept: CARDIOLOGY | Facility: CLINIC | Age: 87
End: 2022-06-17
Payer: MEDICARE

## 2022-06-17 ENCOUNTER — NON-APPOINTMENT (OUTPATIENT)
Age: 87
End: 2022-06-17

## 2022-06-17 VITALS
SYSTOLIC BLOOD PRESSURE: 134 MMHG | OXYGEN SATURATION: 98 % | BODY MASS INDEX: 30 KG/M2 | TEMPERATURE: 98.2 F | HEART RATE: 67 BPM | HEIGHT: 62 IN | WEIGHT: 163 LBS | DIASTOLIC BLOOD PRESSURE: 70 MMHG

## 2022-06-17 PROCEDURE — 93000 ELECTROCARDIOGRAM COMPLETE: CPT

## 2022-06-17 PROCEDURE — 99215 OFFICE O/P EST HI 40 MIN: CPT

## 2022-06-17 RX ORDER — NEBIVOLOL 10 MG/1
10 TABLET ORAL DAILY
Qty: 90 | Refills: 3 | Status: DISCONTINUED | COMMUNITY
Start: 2021-05-17 | End: 2022-06-17

## 2022-06-17 RX ORDER — ACETAMINOPHEN 325 MG/1
325 TABLET, FILM COATED ORAL
Refills: 0 | Status: DISCONTINUED | COMMUNITY
End: 2022-06-17

## 2022-06-22 ENCOUNTER — NON-APPOINTMENT (OUTPATIENT)
Age: 87
End: 2022-06-22

## 2022-06-22 NOTE — DISCUSSION/SUMMARY
[Patient] : the patient [Risks] : risks [Benefits] : benefits [Alternatives] : alternatives [With Me] : with me [___ Month(s)] : in [unfilled] month(s) [FreeTextEntry1] : This is a 86 year old woman with hsitory of labile hypertension, orthostatic hypotension, here with dizziness and insomnia\par \par \par 1) labile blood pressure : controled. ct current meds. \par 2) dizziness:  carotid US: \par 3) near syncope:  Likely vasovagal or BP meds.    \par 4) Insomnia:  titraiting off bystolic as patient staes she does not like ti. and sleep study./ \par 5) hypertension : lisinopril and HCTz in am.  isrtaduipien morning and supper.  Bystolic at bedtime.   hydralazine.   10 BID \par 6) Sciatica: Lyrica \par 7) colitis:  No animal milk.  No glutein. avoid bread in general. No meat. \par Will order and review ECG for the above mentioned diagnosis/condition/symptoms az a

## 2022-06-22 NOTE — HISTORY OF PRESENT ILLNESS
"Leanna García is a 56 y.o. female patient.    Temp: 97.6 °F (36.4 °C) (05/08/22 0828)  Pulse: 67 (05/08/22 0828)  Resp: 20 (05/08/22 0817)  BP: (!) 158/67 (05/08/22 0828)  SpO2: 96 % (05/08/22 0828)  Weight: 89 kg (196 lb 3.4 oz) (05/08/22 0300)  Height: 5' 7" (170.2 cm) (05/05/22 0306)       Incision and Drainage    Date/Time: 5/8/2022 10:49 AM  Location procedure was performed: [facility]  Performed by: Alivia Bruno DPM  Authorized by: Alivia Bruno DPM           5/8/2022   Procedure:  Incision and drainage below fascia, ligament, and tendon left foot.  Excisional debridement of left foot ulcer    Location of abscess:  Left foot deep tissue abscess below fascia muscle and tendon    With patients verbal and/or written consent, the left foot was scrubbed and draped in the usual aseptic manner. Full-thickness excisional debridement to fascia, tendon and ligament was performed at bedside utilizing a # 15 blade, sterile scissors, pickups, tissue nippers, and a curette. Necrotic tissue, biofilm, slough, necrotic tendon, necrotic ligament, necrotic subcutaneous tissue, and necrotic fascia was debrided from the wound base.  Large deep tissue abscess was encountered below the plantar fascia and below the flexor hallucis longus tendon.  Incision and drainage of this abscess was performed.  The infection and purulent drainage was below the fascia.  Copious amounts of purulent drainage was drained from the foot.  The abscess was fully evacuated.  Purulent drainage was sent for culture analysis as well.  All loculations were broken up inside the abscess cavity.  Myonecrosis was noted and all nonviable tissue was debrided.  Care was taken to identify and protect all neural and vascular structures.  Copious irrigation consisting of sterile saline was then used to clean the wound and abscess cavity.   Post wound measurements were approximately 3 cm x 6.5 cm by 2 cm deep.  Wound extends to bone. Patient tolerated the " procedure well   Surgicel was placed in the wound for hemostasis and a sterile bulky dressing was then placed over the left foot.       [FreeTextEntry1] : labile blood pressure. orthostatic hypotension\par \par \par HPI for today: :  she still has colitis.  no chest pain . no dyspnea on exertion . no palpitionts.   no dizziness,.\par no syncope. as\par \par  as\par old note: she has a lot of pain. right hip pain.  \par she is going to a chiropractor.  it hurts on the right leg.  no ches tpain. no dyspnea.  no syncoope. no palptiations.\par \par \par \par old note: This is a 86 year old woman with history of labile blood pressure and orthostatic hypotension.\par she does have dyspnea on exertion and when she exerts her self too much she gets shortness of breathe .\par no chest pain./ no syncope. no palpations  she did have a near syncope epsiodes . \par she takes all the meds in the am.  and when she takes all tehmeds together she feel dizzy in the evening.\par she is also feeling insomnia. cannot sleep at night. she is takign ylenolol PM and melatonin. she has anxiety.\par \par she started on sleep medicine trazodone.  when she took the meds. she felt better and  good sleep.\par \par

## 2022-06-22 NOTE — CARDIOLOGY SUMMARY
[de-identified] : 6 17 2022 c Sinus  Rhythm \par -RSR(V1) -nondiagnostic. \par \par PROBABLY NORMAL\par \par \par 1 18 2022  Sinus  Rhythm \par -Left atrial enlargement. \par Poor r wave progression\par \par 11 10 2021  Sinus  Bradycardia \par -RSR(V1) -nondiagnostic. \par \par  NORMAL\par  [de-identified] : apr 2021: Normal LVEF.  mild TR and Mild MR.  [de-identified] : sept 2019: non obstrucitve coronary artery disease moderate lad disease.  [de-identified] : nov 2021: Mild plaqwue. no stneosis  [de-identified] : Jan 2022:   renal Duplex:  no renal artery stenosis.

## 2022-06-29 ENCOUNTER — NON-APPOINTMENT (OUTPATIENT)
Age: 87
End: 2022-06-29

## 2022-09-27 ENCOUNTER — NON-APPOINTMENT (OUTPATIENT)
Age: 87
End: 2022-09-27

## 2022-09-27 ENCOUNTER — EMERGENCY (EMERGENCY)
Facility: HOSPITAL | Age: 87
LOS: 1 days | Discharge: HOME | End: 2022-09-27
Attending: EMERGENCY MEDICINE
Payer: MEDICARE

## 2022-09-27 VITALS
WEIGHT: 139.99 LBS | OXYGEN SATURATION: 100 % | HEIGHT: 63 IN | HEART RATE: 55 BPM | SYSTOLIC BLOOD PRESSURE: 115 MMHG | RESPIRATION RATE: 16 BRPM | TEMPERATURE: 98 F | DIASTOLIC BLOOD PRESSURE: 69 MMHG

## 2022-09-27 VITALS
RESPIRATION RATE: 18 BRPM | HEART RATE: 62 BPM | DIASTOLIC BLOOD PRESSURE: 88 MMHG | SYSTOLIC BLOOD PRESSURE: 154 MMHG | TEMPERATURE: 98 F | OXYGEN SATURATION: 99 %

## 2022-09-27 LAB
ALBUMIN SERPL ELPH-MCNC: 4.1 G/DL — SIGNIFICANT CHANGE UP (ref 3.3–5.2)
ALP SERPL-CCNC: 87 U/L — SIGNIFICANT CHANGE UP (ref 40–120)
ALT FLD-CCNC: 19 U/L — SIGNIFICANT CHANGE UP
ANION GAP SERPL CALC-SCNC: 11 MMOL/L — SIGNIFICANT CHANGE UP (ref 5–17)
APPEARANCE UR: CLEAR — SIGNIFICANT CHANGE UP
AST SERPL-CCNC: 21 U/L — SIGNIFICANT CHANGE UP
BASOPHILS # BLD AUTO: 0.07 K/UL — SIGNIFICANT CHANGE UP (ref 0–0.2)
BASOPHILS NFR BLD AUTO: 0.9 % — SIGNIFICANT CHANGE UP (ref 0–2)
BILIRUB SERPL-MCNC: 0.3 MG/DL — LOW (ref 0.4–2)
BILIRUB UR-MCNC: NEGATIVE — SIGNIFICANT CHANGE UP
BUN SERPL-MCNC: 20 MG/DL — SIGNIFICANT CHANGE UP (ref 8–20)
CALCIUM SERPL-MCNC: 9 MG/DL — SIGNIFICANT CHANGE UP (ref 8.4–10.5)
CHLORIDE SERPL-SCNC: 94 MMOL/L — LOW (ref 98–107)
CO2 SERPL-SCNC: 24 MMOL/L — SIGNIFICANT CHANGE UP (ref 22–29)
COLOR SPEC: YELLOW — SIGNIFICANT CHANGE UP
CREAT SERPL-MCNC: 0.61 MG/DL — SIGNIFICANT CHANGE UP (ref 0.5–1.3)
DIFF PNL FLD: NEGATIVE — SIGNIFICANT CHANGE UP
EGFR: 87 ML/MIN/1.73M2 — SIGNIFICANT CHANGE UP
EOSINOPHIL # BLD AUTO: 0.07 K/UL — SIGNIFICANT CHANGE UP (ref 0–0.5)
EOSINOPHIL NFR BLD AUTO: 0.9 % — SIGNIFICANT CHANGE UP (ref 0–6)
GLUCOSE SERPL-MCNC: 114 MG/DL — HIGH (ref 70–99)
GLUCOSE UR QL: NEGATIVE MG/DL — SIGNIFICANT CHANGE UP
HCT VFR BLD CALC: 38.7 % — SIGNIFICANT CHANGE UP (ref 34.5–45)
HGB BLD-MCNC: 13.1 G/DL — SIGNIFICANT CHANGE UP (ref 11.5–15.5)
IMM GRANULOCYTES NFR BLD AUTO: 1.4 % — HIGH (ref 0–0.9)
KETONES UR-MCNC: NEGATIVE — SIGNIFICANT CHANGE UP
LEUKOCYTE ESTERASE UR-ACNC: NEGATIVE — SIGNIFICANT CHANGE UP
LYMPHOCYTES # BLD AUTO: 0.9 K/UL — LOW (ref 1–3.3)
LYMPHOCYTES # BLD AUTO: 11.6 % — LOW (ref 13–44)
MAGNESIUM SERPL-MCNC: 2.4 MG/DL — SIGNIFICANT CHANGE UP (ref 1.6–2.6)
MCHC RBC-ENTMCNC: 29 PG — SIGNIFICANT CHANGE UP (ref 27–34)
MCHC RBC-ENTMCNC: 33.9 GM/DL — SIGNIFICANT CHANGE UP (ref 32–36)
MCV RBC AUTO: 85.6 FL — SIGNIFICANT CHANGE UP (ref 80–100)
MONOCYTES # BLD AUTO: 0.56 K/UL — SIGNIFICANT CHANGE UP (ref 0–0.9)
MONOCYTES NFR BLD AUTO: 7.2 % — SIGNIFICANT CHANGE UP (ref 2–14)
NEUTROPHILS # BLD AUTO: 6.07 K/UL — SIGNIFICANT CHANGE UP (ref 1.8–7.4)
NEUTROPHILS NFR BLD AUTO: 78 % — HIGH (ref 43–77)
NITRITE UR-MCNC: NEGATIVE — SIGNIFICANT CHANGE UP
NT-PROBNP SERPL-SCNC: 94 PG/ML — SIGNIFICANT CHANGE UP (ref 0–300)
PH UR: 8 — SIGNIFICANT CHANGE UP (ref 5–8)
PLATELET # BLD AUTO: 311 K/UL — SIGNIFICANT CHANGE UP (ref 150–400)
POTASSIUM SERPL-MCNC: 4.1 MMOL/L — SIGNIFICANT CHANGE UP (ref 3.5–5.3)
POTASSIUM SERPL-SCNC: 4.1 MMOL/L — SIGNIFICANT CHANGE UP (ref 3.5–5.3)
PROT SERPL-MCNC: 7 G/DL — SIGNIFICANT CHANGE UP (ref 6.6–8.7)
PROT UR-MCNC: NEGATIVE — SIGNIFICANT CHANGE UP
RAPID RVP RESULT: SIGNIFICANT CHANGE UP
RBC # BLD: 4.52 M/UL — SIGNIFICANT CHANGE UP (ref 3.8–5.2)
RBC # FLD: 13.2 % — SIGNIFICANT CHANGE UP (ref 10.3–14.5)
SARS-COV-2 RNA SPEC QL NAA+PROBE: SIGNIFICANT CHANGE UP
SODIUM SERPL-SCNC: 129 MMOL/L — LOW (ref 135–145)
SP GR SPEC: 1.01 — SIGNIFICANT CHANGE UP (ref 1.01–1.02)
TROPONIN T SERPL-MCNC: <0.01 NG/ML — SIGNIFICANT CHANGE UP (ref 0–0.06)
UROBILINOGEN FLD QL: NEGATIVE MG/DL — SIGNIFICANT CHANGE UP
WBC # BLD: 7.78 K/UL — SIGNIFICANT CHANGE UP (ref 3.8–10.5)
WBC # FLD AUTO: 7.78 K/UL — SIGNIFICANT CHANGE UP (ref 3.8–10.5)

## 2022-09-27 PROCEDURE — 83735 ASSAY OF MAGNESIUM: CPT

## 2022-09-27 PROCEDURE — 81003 URINALYSIS AUTO W/O SCOPE: CPT

## 2022-09-27 PROCEDURE — 36415 COLL VENOUS BLD VENIPUNCTURE: CPT

## 2022-09-27 PROCEDURE — 96361 HYDRATE IV INFUSION ADD-ON: CPT

## 2022-09-27 PROCEDURE — 82962 GLUCOSE BLOOD TEST: CPT

## 2022-09-27 PROCEDURE — 93005 ELECTROCARDIOGRAM TRACING: CPT

## 2022-09-27 PROCEDURE — 71045 X-RAY EXAM CHEST 1 VIEW: CPT

## 2022-09-27 PROCEDURE — 87086 URINE CULTURE/COLONY COUNT: CPT

## 2022-09-27 PROCEDURE — 71045 X-RAY EXAM CHEST 1 VIEW: CPT | Mod: 26

## 2022-09-27 PROCEDURE — 84484 ASSAY OF TROPONIN QUANT: CPT

## 2022-09-27 PROCEDURE — 93010 ELECTROCARDIOGRAM REPORT: CPT

## 2022-09-27 PROCEDURE — 80048 BASIC METABOLIC PNL TOTAL CA: CPT

## 2022-09-27 PROCEDURE — 83880 ASSAY OF NATRIURETIC PEPTIDE: CPT

## 2022-09-27 PROCEDURE — 80053 COMPREHEN METABOLIC PANEL: CPT

## 2022-09-27 PROCEDURE — 85025 COMPLETE CBC W/AUTO DIFF WBC: CPT

## 2022-09-27 PROCEDURE — 96374 THER/PROPH/DIAG INJ IV PUSH: CPT

## 2022-09-27 PROCEDURE — 99285 EMERGENCY DEPT VISIT HI MDM: CPT | Mod: 25

## 2022-09-27 PROCEDURE — 99285 EMERGENCY DEPT VISIT HI MDM: CPT

## 2022-09-27 PROCEDURE — 0225U NFCT DS DNA&RNA 21 SARSCOV2: CPT

## 2022-09-27 RX ORDER — ONDANSETRON 8 MG/1
4 TABLET, FILM COATED ORAL ONCE
Refills: 0 | Status: COMPLETED | OUTPATIENT
Start: 2022-09-27 | End: 2022-09-27

## 2022-09-27 RX ORDER — SODIUM CHLORIDE 9 MG/ML
1000 INJECTION INTRAMUSCULAR; INTRAVENOUS; SUBCUTANEOUS ONCE
Refills: 0 | Status: COMPLETED | OUTPATIENT
Start: 2022-09-27 | End: 2022-09-27

## 2022-09-27 RX ADMIN — SODIUM CHLORIDE 1000 MILLILITER(S): 9 INJECTION INTRAMUSCULAR; INTRAVENOUS; SUBCUTANEOUS at 12:26

## 2022-09-27 RX ADMIN — ONDANSETRON 4 MILLIGRAM(S): 8 TABLET, FILM COATED ORAL at 10:26

## 2022-09-27 RX ADMIN — SODIUM CHLORIDE 1000 MILLILITER(S): 9 INJECTION INTRAMUSCULAR; INTRAVENOUS; SUBCUTANEOUS at 10:26

## 2022-09-27 NOTE — ED ADULT NURSE NOTE - NS ED NOTE ABUSE SUSPICION NEGLECT YN
No Principal Discharge DX:	Bilirubinemia  Assessment and plan of treatment:	1. Follow-up with your pediatrician in 1-2 days.   2. Ensure adequate feeding.

## 2022-09-27 NOTE — ED PROVIDER NOTE - PROGRESS NOTE DETAILS
Fan: Patient states she feels better. Still a little bit weak but feels her strength coming back. Feels okay to go home, will follow up with her gastroenterologist. called patient to inform that she needed to follow up with primary care for hyponatremia. patient expressed understanding.

## 2022-09-27 NOTE — ED PROVIDER NOTE - CLINICAL SUMMARY MEDICAL DECISION MAKING FREE TEXT BOX
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 Pt also c/o nausea and chills. Was hypotensive in ambulance then improved BP with 250 cc fluids. Saw cardiologist few months ago. Ordered basic labs along with EKG, troponin, BNP, UA, and CXR, will reassess. 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 Pt also c/o nausea and chills. Was hypotensive in ambulance then improved BP with 250 cc fluids. Saw cardiologist few months ago. Ordered basic labs along with EKG, troponin, BNP, UA, and CXR. Workup grossly negative. Patient improved with fluids. Will follow up with GI as this is similar to prior episodes of colitis.

## 2022-09-27 NOTE — ED ADULT TRIAGE NOTE - CHIEF COMPLAINT QUOTE
pt BIBA s/p syncopal episode while volunteering. states felt lightheaded and dizzy like she was going to pass out so sat down. EMS reports pt was hypotensive and gave 250cc NS bolus via #20 LFA. pt a&ox3, denies cp, sob, dizziness at this time.

## 2022-09-27 NOTE — ED PROVIDER NOTE - NSPTACCESSSVCSAPPTDETAILS_ED_ALL_ED_FT
Normally sees dr crabtree at [facility] in [place]. patient tried calling but was told the earliest appointment was in november.

## 2022-09-27 NOTE — ED PROVIDER NOTE - NSFOLLOWUPINSTRUCTIONS_ED_ALL_ED_FT
- Follow up with your gastroenterologist.   - Return to the ED if you have recurrence of symptoms, fever/chills, n/v, blood in stool, or any other new or worsening symptoms. - Follow up with your gastroenterologist.   - Follow up with your primary care for hyponatremia 129.  - Return to the ED if you have recurrence of symptoms, fever/chills, n/v, blood in stool, or any other new or worsening symptoms.

## 2022-09-27 NOTE — ED PROVIDER NOTE - PATIENT PORTAL LINK FT
You can access the FollowMyHealth Patient Portal offered by [facility] by registering at the following website: [email or web address]. By joining [employer]’s FollowMyHealth portal, you will also be able to view your health information using other applications (apps) compatible with our system.

## 2022-09-27 NOTE — ED PROVIDER NOTE - PHYSICAL EXAMINATION
Gen: AAOx3, NAD, well nourished  HEENT: Normocephalic atraumatic. EOMI. No scleral icterus. Moist mucus membranes.  CV: RRR. Audible S1 and S2. No murmurs. 2+ radial and PT pulses   Pulm: Clear to auscultation bilaterally. No wheezes, rales, or rhonchi. No accessory muscle use or respiratory distress.  Abdomen: soft, normoactive BS, non distended, nontender, no rebound, no guarding  Musculoskeletal:  Moving all extremities equally. No gross deformity. No tenderness to palpation.  Skin: B/l LE pitting edema; No rashes or lesions. Warm.  Neurologic: No focal neurological deficits. CN II-XII grossly intact.  : no CVA tenderness  Psych: Appropriate mood and affect. Cooperative.

## 2022-09-27 NOTE — ED PROVIDER NOTE - OBJECTIVE STATEMENT
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 Pt states feeling the need to go to the bathroom urgently, went to BR and had diarrhea proceeded by lightheadedness, nausea, cold sweats. Pt went to sit down then had LOC. Pt was found by other volunteers but syncope not witnessed. Pt denies blood in stool or head trauma. In ambulance, pt claims she was 90/40 then given 250 cc NS which improved her BP. Pt currently c/o chills, nausea, generalized weakness, and lightheadedness. She states feeling SOB usually d/t asthma. Pt denies chest pain, blurry vision, HA, head/neck pain, or fever  Pt last saw her cardiologist in [place] a few months ago. Last echo on 10/04/2018 revealing a small area of ischemia in apical anterolateral wall with EF of 70%. Left heart cath on 09/27/2019, showed non-occlusive CAD with EF of 65%. 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 Pt states feeling the need to go to the bathroom urgently, went to BR and had diarrhea proceeded by lightheadedness, nausea, cold sweats. Pt went to sit down then had LOC. Pt was found by other volunteers but syncope not witnessed. Pt denies blood in stool or head trauma. In ambulance, pt claims she was 90/40 then given 250 cc NS which improved her BP. Pt currently c/o chills, nausea, generalized weakness, and lightheadedness. She states feeling SOB usually d/t asthma. Pt denies chest pain, blurry vision, HA, head/neck pain, or fever. Patient states this is similar to prior episodes of colitis that she has had.   Pt last saw her cardiologist in [place] a few months ago. Last echo on 10/04/2018 revealing a small area of ischemia in apical anterolateral wall with EF of 70%. Left heart cath on 09/27/2019, showed non-occlusive CAD with EF of 65%.

## 2022-09-27 NOTE — ED PROVIDER NOTE - ATTENDING CONTRIBUTION TO CARE
Dr. Loera : I have personally seen and examined this patient at the bedside. I have fully participated in the care of this patient. I have reviewed all pertinent clinical information, including history, physical exam, plan and the Resident's/students note and agree except as noted.      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 Pt states feeling the need to go to the bathroom urgently, went to BR and had diarrhea abd crampoing proceeded by lightheadedness, nausea, cold sweats - followed by loc when went to sit down. notes that has had bad episodes of colitis in the past and sometimes felt lightheaded with it but today was the worst episode. no head trauma. no blood in stool.. notes that had low bp in the ambulance but improved sp 250cc of fluid.    Pt last saw her cardiologist in [place] a few months ago. Last echo on 10/04/2018 revealing a small area of ischemia in apical anterolateral wall with EF of 70%. Left heart cath on 09/27/2019, showed non-occlusive CAD with EF of 65%.      Denies f/c//cp/sob/palpitations/cough/abd.pain/c/dysuria/hematuria. no sick contacts/recent travel.    PE:  head; atraumatic normocephalic  eyes: perrla  Heart: rrr s1s2  lungs: ctab  abd: soft, nt nd + bs no rebound/guarding no cva ttp  le: no swelling no calf ttp  back: no midline cervical/thoracic/lumbar ttp      -->well appearing feeling better after hydration; had a few mlore episodes of diarrhea in the ED notes that would like to go home and follow up with her cardiologist and GI doctor; will check labs hydrate if improves ok to dc most likely vasovagal syncope given episodes of diarrhea and abd cramping leading to passing out

## 2022-09-27 NOTE — ED PROVIDER NOTE - NS ED ROS FT
General: Chills, cold sweats; No fever.  EENT: No vision changes, hearing changes, nasal congestion, throat pain, difficulty swallowing.  Respiratory: No SOB, cough, wheezing.  Cardiac: No chest pain, palpitations, lower extremity edema.  GI: Nausea, diarrhea; No abdominal pain, vomiting, constipation.  : No difficulty with urination, pain with urination, hematuria.  Skin: No rashes.  Neuro: Syncope, lightheadedness, generalized weakness, dizziness; No headache.  MSK: No muscle pain, joint pain, back pain.  Psych: No known mental health issues.  Endocrine: No heat/cold intolerance, no polyuria/polydipsia.  Heme: No easy bruising or bleeding.  Allergic: No pruritis, dermatitis, or environmental allergies.

## 2022-09-28 ENCOUNTER — NON-APPOINTMENT (OUTPATIENT)
Age: 87
End: 2022-09-28

## 2022-09-28 LAB
CULTURE RESULTS: SIGNIFICANT CHANGE UP
SPECIMEN SOURCE: SIGNIFICANT CHANGE UP

## 2022-09-28 NOTE — CHART NOTE - NSCHARTNOTEFT_GEN_A_CORE
Patient has follow up appointment with JAIME Neely on 9/29/22 at 9:30am  Gastroenterology  [address], [place] [address]  Phone: [phone]

## 2022-09-29 ENCOUNTER — APPOINTMENT (OUTPATIENT)
Dept: GASTROENTEROLOGY | Facility: CLINIC | Age: 87
End: 2022-09-29

## 2022-09-29 VITALS
WEIGHT: 162 LBS | DIASTOLIC BLOOD PRESSURE: 82 MMHG | SYSTOLIC BLOOD PRESSURE: 130 MMHG | BODY MASS INDEX: 29.81 KG/M2 | OXYGEN SATURATION: 98 % | TEMPERATURE: 97.2 F | HEIGHT: 62 IN | HEART RATE: 72 BPM | RESPIRATION RATE: 14 BRPM

## 2022-09-29 DIAGNOSIS — K63.89 OTHER SPECIFIED DISEASES OF INTESTINE: ICD-10-CM

## 2022-09-29 PROCEDURE — 99214 OFFICE O/P EST MOD 30 MIN: CPT

## 2022-09-29 NOTE — ASSESSMENT
[FreeTextEntry1] : Patient is a 86 year old female, with PMH of proctosigmoiditis, asthma, HTN, HLD, CAD, who presents for follow up after recent ER visit. \par \par Intermittent and recurrent episodes of diarrhea with associated abdominal pain, cramping and LOC, possible hypotension? No imaging in ER, labs were normal except for hyponatremia. 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 Will restart Rowasa enema QHS. \par \par Will order CT a/p with IV contrast to further evaluate. \par \par Will order repeat labs including CBC, CMP, CRP, fecal calprotectin. \par \par Pt has appt scheduled with Dr. Cobb 11/8/22, she will f/u with him at  that time - sooner if needed

## 2022-09-29 NOTE — HISTORY OF PRESENT ILLNESS
[FreeTextEntry1] : Patient is a 86 year old female, with PMH of proctosigmoiditis, asthma, HTN, HLD, CAD, who presents for follow up after recent ER visit. \par \par Pt states she has a h/o colitis, noted on imaging studies in the past. She had a flex sig with Dr. Cobb in 2018 which appeared normal but pathology showed mild acute colitis and an inflammatory polyp in the rectum. She had been on Budesonide on and off in the past. She states she was recently back on it but d/c it about 1 month ago. \par \par She states that since May 2022, she has had a few of these "episodes". She states she gets sudden fecal urgency and diarrhea with associated cramping. After the fact, she has weakness, dizziness, tingling to extremities. On 9/27/22, she had a syncopal episode after her diarrhea. 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 Pt states she had sudden abdominal pain/cramping, had fecal incontinence and diarrhea. She denies blood in the stool. States that she lost consciousness as she was walking out of the restroom. \par \par She was brought to [facility] via ambulance. While there, labs were normal except hyponatremia, Na 129. Hgb was 13.1. No imaging done. \par \par She has an appt with PCP next week and plans to f/u with cardiology as well. \par \par Patient denies pyrosis, dysphagia, rectal bleeding, vomiting, or unexplained weight loss.

## 2022-09-29 NOTE — PHYSICAL EXAM
[Alert] : alert [Normal Voice/Communication] : normal voice/communication [Healthy Appearing] : healthy appearing [No Acute Distress] : no acute distress [Sclera] : the sclera and conjunctiva were normal [Hearing Threshold Finger Rub Not [place]] : hearing was normal [Normal Lips/Gums] : the lips and gums were normal [Oropharynx] : the oropharynx was normal [Normal Appearance] : the appearance of the neck was normal [No Neck Mass] : no neck mass was observed [No Respiratory Distress] : no respiratory distress [No Acc Muscle Use] : no accessory muscle use [Respiration, Rhythm And Depth] : normal respiratory rhythm and effort [Auscultation Breath Sounds / Voice Sounds] : lungs were clear to auscultation bilaterally [Heart Rate And Rhythm] : heart rate was normal and rhythm regular [Normal S1, S2] : normal S1 and S2 [Bowel Sounds] : normal bowel sounds [Abdomen Tenderness] : non-tender [No Masses] : no abdominal mass palpated [Abdomen Soft] : soft [] : no hepatosplenomegaly [Oriented To Time, Place, And Person] : oriented to person, place, and time

## 2022-10-01 ENCOUNTER — APPOINTMENT (OUTPATIENT)
Dept: CT IMAGING | Facility: CLINIC | Age: 87
End: 2022-10-01

## 2022-10-01 ENCOUNTER — OUTPATIENT (OUTPATIENT)
Dept: OUTPATIENT SERVICES | Facility: HOSPITAL | Age: 87
LOS: 1 days | End: 2022-10-01
Payer: MEDICARE

## 2022-10-01 DIAGNOSIS — Z00.00 ENCOUNTER FOR GENERAL ADULT MEDICAL EXAMINATION WITHOUT ABNORMAL FINDINGS: ICD-10-CM

## 2022-10-01 PROCEDURE — 74177 CT ABD & PELVIS W/CONTRAST: CPT | Mod: 26,MH

## 2022-10-01 PROCEDURE — 74177 CT ABD & PELVIS W/CONTRAST: CPT

## 2022-10-04 ENCOUNTER — APPOINTMENT (OUTPATIENT)
Dept: INTERNAL MEDICINE | Facility: CLINIC | Age: 87
End: 2022-10-04

## 2022-10-04 VITALS
TEMPERATURE: 97 F | HEART RATE: 60 BPM | HEIGHT: 62 IN | RESPIRATION RATE: 13 BRPM | DIASTOLIC BLOOD PRESSURE: 80 MMHG | SYSTOLIC BLOOD PRESSURE: 134 MMHG | OXYGEN SATURATION: 98 % | WEIGHT: 160 LBS | BODY MASS INDEX: 29.44 KG/M2

## 2022-10-04 DIAGNOSIS — Z09 ENCOUNTER FOR FOLLOW-UP EXAMINATION AFTER COMPLETED TREATMENT FOR CONDITIONS OTHER THAN MALIGNANT NEOPLASM: ICD-10-CM

## 2022-10-04 DIAGNOSIS — Z23 ENCOUNTER FOR IMMUNIZATION: ICD-10-CM

## 2022-10-04 PROCEDURE — 99214 OFFICE O/P EST MOD 30 MIN: CPT | Mod: 25

## 2022-10-04 PROCEDURE — 36415 COLL VENOUS BLD VENIPUNCTURE: CPT

## 2022-10-04 RX ORDER — DENOSUMAB 60 MG/ML
60 INJECTION SUBCUTANEOUS
Refills: 0 | Status: ACTIVE | COMMUNITY
Start: 2022-10-04

## 2022-10-04 RX ORDER — CHOLECALCIFEROL (VITAMIN D3) 50 MCG
50 MCG TABLET ORAL
Refills: 0 | Status: ACTIVE | COMMUNITY
Start: 2022-10-04

## 2022-10-05 LAB
ALBUMIN SERPL ELPH-MCNC: 4.4 G/DL
ALBUMIN SERPL ELPH-MCNC: 4.5 G/DL
ALP BLD-CCNC: 82 U/L
ALP BLD-CCNC: 82 U/L
ALT SERPL-CCNC: 18 U/L
ALT SERPL-CCNC: 19 U/L
ANION GAP SERPL CALC-SCNC: 12 MMOL/L
ANION GAP SERPL CALC-SCNC: 12 MMOL/L
AST SERPL-CCNC: 15 U/L
AST SERPL-CCNC: 18 U/L
BASOPHILS # BLD AUTO: 0.07 K/UL
BASOPHILS NFR BLD AUTO: 1.1 %
BILIRUB SERPL-MCNC: <0.2 MG/DL
BILIRUB SERPL-MCNC: <0.2 MG/DL
BUN SERPL-MCNC: 19 MG/DL
BUN SERPL-MCNC: 19 MG/DL
CALCIUM SERPL-MCNC: 9.2 MG/DL
CALCIUM SERPL-MCNC: 9.4 MG/DL
CHLORIDE SERPL-SCNC: 96 MMOL/L
CHLORIDE SERPL-SCNC: 96 MMOL/L
CO2 SERPL-SCNC: 23 MMOL/L
CO2 SERPL-SCNC: 24 MMOL/L
CREAT SERPL-MCNC: 0.56 MG/DL
CREAT SERPL-MCNC: 0.61 MG/DL
CRP SERPL-MCNC: 7 MG/L
EGFR: 87 ML/MIN/1.73M2
EGFR: 89 ML/MIN/1.73M2
EOSINOPHIL # BLD AUTO: 0.09 K/UL
EOSINOPHIL NFR BLD AUTO: 1.4 %
GLUCOSE SERPL-MCNC: 95 MG/DL
GLUCOSE SERPL-MCNC: 95 MG/DL
HCT VFR BLD CALC: 38.9 %
HGB BLD-MCNC: 12.5 G/DL
IMM GRANULOCYTES NFR BLD AUTO: 1.6 %
INR PPP: 0.87 RATIO
LYMPHOCYTES # BLD AUTO: 1.16 K/UL
LYMPHOCYTES NFR BLD AUTO: 18.6 %
MAN DIFF?: NORMAL
MCHC RBC-ENTMCNC: 28.5 PG
MCHC RBC-ENTMCNC: 32.1 GM/DL
MCV RBC AUTO: 88.8 FL
MONOCYTES # BLD AUTO: 0.61 K/UL
MONOCYTES NFR BLD AUTO: 9.8 %
NEUTROPHILS # BLD AUTO: 4.22 K/UL
NEUTROPHILS NFR BLD AUTO: 67.5 %
PLATELET # BLD AUTO: 384 K/UL
POTASSIUM SERPL-SCNC: 5.1 MMOL/L
POTASSIUM SERPL-SCNC: 5.2 MMOL/L
PROT SERPL-MCNC: 6.9 G/DL
PROT SERPL-MCNC: 7.1 G/DL
PT BLD: 10.3 SEC
RBC # BLD: 4.38 M/UL
RBC # FLD: 13.6 %
SODIUM SERPL-SCNC: 131 MMOL/L
SODIUM SERPL-SCNC: 132 MMOL/L
WBC # FLD AUTO: 6.25 K/UL

## 2022-10-05 NOTE — ADDENDUM
[FreeTextEntry1] : labs show\par - Sodium decreased at 131, ongoing/recurrent issue in the setting of syncope=start NACL tablets one daily, repeat in 2 weeks, consider nephrology evaluation

## 2022-10-05 NOTE — HISTORY OF PRESENT ILLNESS
[FreeTextEntry2] : Pt presents s/p [facility] ED, patient presented on September 27, 2022 after syncopal episode.\par Patient stated she felt the need to go to the bathroom urgently and went to the bathroom and had diarrhea preceded by lightheadedness, nausea and cold sweats.She went to sit down and then had loss of consciousness.\par Syncope was not witnessed but others found the patient No blood in stool or any head trauma.\par Patient claimed that ambulance blood pressure 90/40 and was given normal saline which improved her blood pressure.\par In ER patient complained of chills, nausea, generalized weakness and lightheadedness.\par \par \par Hospital records showed\par -evaluated with normal exam.\par - sodium= 129\par chest x-ray clear\par EKG without acute changes With negative cardiac enzymes\par IV fluids given\par patient improved and felt stable to go home and recommended followup with GI.\par Pt saw GI and was given Rowasa enema QHS, CT A/P ordered and labs ordered\par Pt has had no recurrence/sequelae\par

## 2022-10-05 NOTE — ASSESSMENT
[FreeTextEntry1] : Venipuncture done in our office, Labs sent out.GI followup with testing as planned. RTO as sched for reg check in 1month\par \par Dr. Calero was present in office building while I examined patient\par

## 2022-10-06 ENCOUNTER — NON-APPOINTMENT (OUTPATIENT)
Age: 87
End: 2022-10-06

## 2022-10-06 LAB
BASOPHILS # BLD AUTO: 0.06 K/UL
BASOPHILS NFR BLD AUTO: 0.9 %
EOSINOPHIL # BLD AUTO: 0.09 K/UL
EOSINOPHIL NFR BLD AUTO: 1.4 %
HCT VFR BLD CALC: 34.8 %
HGB BLD-MCNC: 11.6 G/DL
IMM GRANULOCYTES NFR BLD AUTO: 1.2 %
LYMPHOCYTES # BLD AUTO: 1.13 K/UL
LYMPHOCYTES NFR BLD AUTO: 17.1 %
MAN DIFF?: NORMAL
MCHC RBC-ENTMCNC: 29.1 PG
MCHC RBC-ENTMCNC: 33.3 GM/DL
MCV RBC AUTO: 87.4 FL
MONOCYTES # BLD AUTO: 0.56 K/UL
MONOCYTES NFR BLD AUTO: 8.5 %
NEUTROPHILS # BLD AUTO: 4.69 K/UL
NEUTROPHILS NFR BLD AUTO: 70.9 %
PLATELET # BLD AUTO: 350 K/UL
RBC # BLD: 3.98 M/UL
RBC # FLD: 14 %
WBC # FLD AUTO: 6.61 K/UL

## 2022-10-13 ENCOUNTER — EMERGENCY (EMERGENCY)
Facility: HOSPITAL | Age: 87
LOS: 1 days | Discharge: HOME | End: 2022-10-13
Attending: EMERGENCY MEDICINE
Payer: MEDICARE

## 2022-10-13 VITALS
RESPIRATION RATE: 18 BRPM | OXYGEN SATURATION: 98 % | WEIGHT: 160.06 LBS | HEIGHT: 63 IN | TEMPERATURE: 98 F | SYSTOLIC BLOOD PRESSURE: 189 MMHG | DIASTOLIC BLOOD PRESSURE: 100 MMHG | HEART RATE: 67 BPM

## 2022-10-13 VITALS
RESPIRATION RATE: 18 BRPM | DIASTOLIC BLOOD PRESSURE: 75 MMHG | TEMPERATURE: 98 F | HEART RATE: 58 BPM | OXYGEN SATURATION: 96 % | SYSTOLIC BLOOD PRESSURE: 144 MMHG

## 2022-10-13 LAB
ALBUMIN SERPL ELPH-MCNC: 4.5 G/DL — SIGNIFICANT CHANGE UP (ref 3.3–5.2)
ALP SERPL-CCNC: 95 U/L — SIGNIFICANT CHANGE UP (ref 40–120)
ALT FLD-CCNC: 21 U/L — SIGNIFICANT CHANGE UP
ANION GAP SERPL CALC-SCNC: 15 MMOL/L — SIGNIFICANT CHANGE UP (ref 5–17)
APPEARANCE UR: CLEAR — SIGNIFICANT CHANGE UP
AST SERPL-CCNC: 22 U/L — SIGNIFICANT CHANGE UP
BACTERIA # UR AUTO: ABNORMAL
BASOPHILS # BLD AUTO: 0.06 K/UL — SIGNIFICANT CHANGE UP (ref 0–0.2)
BASOPHILS NFR BLD AUTO: 0.9 % — SIGNIFICANT CHANGE UP (ref 0–2)
BILIRUB SERPL-MCNC: 0.2 MG/DL — LOW (ref 0.4–2)
BILIRUB UR-MCNC: NEGATIVE — SIGNIFICANT CHANGE UP
BUN SERPL-MCNC: 11.7 MG/DL — SIGNIFICANT CHANGE UP (ref 8–20)
CALCIUM SERPL-MCNC: 9.7 MG/DL — SIGNIFICANT CHANGE UP (ref 8.4–10.5)
CHLORIDE SERPL-SCNC: 92 MMOL/L — LOW (ref 98–107)
CO2 SERPL-SCNC: 23 MMOL/L — SIGNIFICANT CHANGE UP (ref 22–29)
COLOR SPEC: YELLOW — SIGNIFICANT CHANGE UP
CREAT SERPL-MCNC: 0.5 MG/DL — SIGNIFICANT CHANGE UP (ref 0.5–1.3)
DIFF PNL FLD: NEGATIVE — SIGNIFICANT CHANGE UP
EGFR: 91 ML/MIN/1.73M2 — SIGNIFICANT CHANGE UP
EOSINOPHIL # BLD AUTO: 0.03 K/UL — SIGNIFICANT CHANGE UP (ref 0–0.5)
EOSINOPHIL NFR BLD AUTO: 0.4 % — SIGNIFICANT CHANGE UP (ref 0–6)
EPI CELLS # UR: SIGNIFICANT CHANGE UP
GLUCOSE SERPL-MCNC: 94 MG/DL — SIGNIFICANT CHANGE UP (ref 70–99)
GLUCOSE UR QL: NEGATIVE MG/DL — SIGNIFICANT CHANGE UP
HCT VFR BLD CALC: 40.5 % — SIGNIFICANT CHANGE UP (ref 34.5–45)
HGB BLD-MCNC: 13.8 G/DL — SIGNIFICANT CHANGE UP (ref 11.5–15.5)
IMM GRANULOCYTES NFR BLD AUTO: 1 % — HIGH (ref 0–0.9)
KETONES UR-MCNC: NEGATIVE — SIGNIFICANT CHANGE UP
LACTATE BLDV-MCNC: 1.3 MMOL/L — SIGNIFICANT CHANGE UP (ref 0.5–2)
LEUKOCYTE ESTERASE UR-ACNC: ABNORMAL
LIDOCAIN IGE QN: 26 U/L — SIGNIFICANT CHANGE UP (ref 22–51)
LYMPHOCYTES # BLD AUTO: 1.19 K/UL — SIGNIFICANT CHANGE UP (ref 1–3.3)
LYMPHOCYTES # BLD AUTO: 16.9 % — SIGNIFICANT CHANGE UP (ref 13–44)
MCHC RBC-ENTMCNC: 29.2 PG — SIGNIFICANT CHANGE UP (ref 27–34)
MCHC RBC-ENTMCNC: 34.1 GM/DL — SIGNIFICANT CHANGE UP (ref 32–36)
MCV RBC AUTO: 85.6 FL — SIGNIFICANT CHANGE UP (ref 80–100)
MONOCYTES # BLD AUTO: 0.43 K/UL — SIGNIFICANT CHANGE UP (ref 0–0.9)
MONOCYTES NFR BLD AUTO: 6.1 % — SIGNIFICANT CHANGE UP (ref 2–14)
NEUTROPHILS # BLD AUTO: 5.27 K/UL — SIGNIFICANT CHANGE UP (ref 1.8–7.4)
NEUTROPHILS NFR BLD AUTO: 74.7 % — SIGNIFICANT CHANGE UP (ref 43–77)
NITRITE UR-MCNC: NEGATIVE — SIGNIFICANT CHANGE UP
PH UR: 8 — SIGNIFICANT CHANGE UP (ref 5–8)
PLATELET # BLD AUTO: 354 K/UL — SIGNIFICANT CHANGE UP (ref 150–400)
POTASSIUM SERPL-MCNC: 4.1 MMOL/L — SIGNIFICANT CHANGE UP (ref 3.5–5.3)
POTASSIUM SERPL-SCNC: 4.1 MMOL/L — SIGNIFICANT CHANGE UP (ref 3.5–5.3)
PROT SERPL-MCNC: 8 G/DL — SIGNIFICANT CHANGE UP (ref 6.6–8.7)
PROT UR-MCNC: NEGATIVE — SIGNIFICANT CHANGE UP
RBC # BLD: 4.73 M/UL — SIGNIFICANT CHANGE UP (ref 3.8–5.2)
RBC # FLD: 13.5 % — SIGNIFICANT CHANGE UP (ref 10.3–14.5)
RBC CASTS # UR COMP ASSIST: SIGNIFICANT CHANGE UP /HPF (ref 0–4)
SODIUM SERPL-SCNC: 130 MMOL/L — LOW (ref 135–145)
SP GR SPEC: 1.01 — SIGNIFICANT CHANGE UP (ref 1.01–1.02)
TROPONIN T SERPL-MCNC: <0.01 NG/ML — SIGNIFICANT CHANGE UP (ref 0–0.06)
UROBILINOGEN FLD QL: NEGATIVE MG/DL — SIGNIFICANT CHANGE UP
WBC # BLD: 7.05 K/UL — SIGNIFICANT CHANGE UP (ref 3.8–10.5)
WBC # FLD AUTO: 7.05 K/UL — SIGNIFICANT CHANGE UP (ref 3.8–10.5)
WBC UR QL: SIGNIFICANT CHANGE UP /HPF (ref 0–5)

## 2022-10-13 PROCEDURE — 80053 COMPREHEN METABOLIC PANEL: CPT

## 2022-10-13 PROCEDURE — 83605 ASSAY OF LACTIC ACID: CPT

## 2022-10-13 PROCEDURE — G1004: CPT

## 2022-10-13 PROCEDURE — 36415 COLL VENOUS BLD VENIPUNCTURE: CPT

## 2022-10-13 PROCEDURE — 85025 COMPLETE CBC W/AUTO DIFF WBC: CPT

## 2022-10-13 PROCEDURE — 99285 EMERGENCY DEPT VISIT HI MDM: CPT | Mod: 25

## 2022-10-13 PROCEDURE — 84484 ASSAY OF TROPONIN QUANT: CPT

## 2022-10-13 PROCEDURE — 74177 CT ABD & PELVIS W/CONTRAST: CPT | Mod: 26,ME

## 2022-10-13 PROCEDURE — 99285 EMERGENCY DEPT VISIT HI MDM: CPT

## 2022-10-13 PROCEDURE — 74177 CT ABD & PELVIS W/CONTRAST: CPT | Mod: ME

## 2022-10-13 PROCEDURE — 83690 ASSAY OF LIPASE: CPT

## 2022-10-13 PROCEDURE — 81001 URINALYSIS AUTO W/SCOPE: CPT

## 2022-10-13 PROCEDURE — 93010 ELECTROCARDIOGRAM REPORT: CPT

## 2022-10-13 PROCEDURE — 93005 ELECTROCARDIOGRAM TRACING: CPT

## 2022-10-13 RX ORDER — SODIUM CHLORIDE 9 MG/ML
1 INJECTION INTRAMUSCULAR; INTRAVENOUS; SUBCUTANEOUS ONCE
Refills: 0 | Status: COMPLETED | OUTPATIENT
Start: 2022-10-13 | End: 2022-10-13

## 2022-10-13 RX ORDER — HYDRALAZINE HCL 50 MG
25 TABLET ORAL ONCE
Refills: 0 | Status: COMPLETED | OUTPATIENT
Start: 2022-10-13 | End: 2022-10-13

## 2022-10-13 RX ADMIN — Medication 25 MILLIGRAM(S): at 20:20

## 2022-10-13 RX ADMIN — SODIUM CHLORIDE 1 GRAM(S): 9 INJECTION INTRAMUSCULAR; INTRAVENOUS; SUBCUTANEOUS at 20:20

## 2022-10-13 RX ADMIN — Medication 10 MILLIGRAM(S): at 20:21

## 2022-10-13 NOTE — ED PROVIDER NOTE - CARE PROVIDER_API CALL
Radha Viveros (DO)  Gastroenterology  [address], [address]  [place] [address]  Phone: [phone]  Fax: [phone]  Follow Up Time:     Noemy Lopez)  Cardiology; Internal Medicine  [address], [address]  [place], [place] [id number]  Phone: [phone]  Fax: [phone]  Follow Up Time:

## 2022-10-13 NOTE — ED PROVIDER NOTE - OBJECTIVE STATEMENT
87 year old female with PMH HTN, HLD, IBD/IBS presents with abd pain. Pt reports that she has had intermittent episodes of diarrhea for months, and was started on mesalamine enemas by GI for this in September. She reports that yesterday she began with severe epigastric and periumbilical abd pain and multiple episodes of waterry, non-bloody diarrhea. Denies fevers, chills, chest pain, SOB

## 2022-10-13 NOTE — ED ADULT NURSE REASSESSMENT NOTE - NS ED NURSE REASSESS COMMENT FT1
Assumed care of pt at 19:15 as stated in report from ROXANA Feng. Charting as noted. Patient A&O x4, denies pain/discomfort, denies CP/SOB. Updated on the plan of care. Call bell within reach, bed locked in lowest position. IV site flushed w/ NS. No redness, swelling or pain noted to site. No signs of acute distress noted, safety maintained.

## 2022-10-13 NOTE — ED PROVIDER NOTE - CARE PROVIDERS DIRECT ADDRESSES
[email or web address]

## 2022-10-13 NOTE — ED ADULT TRIAGE NOTE - CHIEF COMPLAINT QUOTE
Pt states she is having epigastric pain and feeling "faint" and reports months of "explosive diarrhea" with hx of colitis and IBS that she thinks is flaring up. Pt denies SOB, chest pain.

## 2022-10-13 NOTE — ED PROVIDER NOTE - PATIENT PORTAL LINK FT
You can access the FollowMyHealth Patient Portal offered by [facility] by registering at the following website: [email or web address]. By joining [employer]’s FollowMyHealth portal, you will also be able to view your health information using other applications (apps) compatible with our system.

## 2022-10-13 NOTE — ED ADULT NURSE NOTE - NSIMPLEMENTINTERV_GEN_ALL_ED
Implemented All Fall with Harm Risk Interventions:  [place] to call system. Call bell, personal items and telephone within reach. Instruct patient to call for assistance. Room bathroom lighting operational. Non-slip footwear when patient is off stretcher. Physically safe environment: no spills, clutter or unnecessary equipment. Stretcher in lowest position, wheels locked, appropriate side rails in place. Provide visual cue, wrist band, yellow gown, etc. Monitor gait and stability. Monitor for mental status changes and reorient to person, place, and time. Review medications for side effects contributing to fall risk. Reinforce activity limits and safety measures with patient and family. Provide visual clues: red socks.

## 2022-10-13 NOTE — ED PROVIDER NOTE - CLINICAL SUMMARY MEDICAL DECISION MAKING FREE TEXT BOX
abd is soft, pt is well appearing, tolerating PO, no systemic signs of illness, stable for dc and gi and cardio f/u

## 2022-10-15 ENCOUNTER — NON-APPOINTMENT (OUTPATIENT)
Age: 87
End: 2022-10-15

## 2022-10-17 LAB — CALPROTECTIN FECAL: <16 UG/G

## 2022-10-18 ENCOUNTER — NON-APPOINTMENT (OUTPATIENT)
Age: 87
End: 2022-10-18

## 2022-10-18 ENCOUNTER — APPOINTMENT (OUTPATIENT)
Dept: GASTROENTEROLOGY | Facility: CLINIC | Age: 87
End: 2022-10-18

## 2022-10-18 ENCOUNTER — APPOINTMENT (OUTPATIENT)
Dept: CARDIOLOGY | Facility: CLINIC | Age: 87
End: 2022-10-18

## 2022-10-18 VITALS
DIASTOLIC BLOOD PRESSURE: 70 MMHG | OXYGEN SATURATION: 95 % | HEART RATE: 57 BPM | SYSTOLIC BLOOD PRESSURE: 150 MMHG | RESPIRATION RATE: 18 BRPM

## 2022-10-18 VITALS
RESPIRATION RATE: 18 BRPM | SYSTOLIC BLOOD PRESSURE: 144 MMHG | HEART RATE: 64 BPM | OXYGEN SATURATION: 97 % | DIASTOLIC BLOOD PRESSURE: 70 MMHG

## 2022-10-18 VITALS
OXYGEN SATURATION: 97 % | RESPIRATION RATE: 18 BRPM | SYSTOLIC BLOOD PRESSURE: 120 MMHG | HEART RATE: 65 BPM | DIASTOLIC BLOOD PRESSURE: 70 MMHG

## 2022-10-18 VITALS
TEMPERATURE: 98 F | HEIGHT: 62 IN | DIASTOLIC BLOOD PRESSURE: 64 MMHG | OXYGEN SATURATION: 96 % | WEIGHT: 168 LBS | HEART RATE: 68 BPM | BODY MASS INDEX: 30.91 KG/M2 | SYSTOLIC BLOOD PRESSURE: 136 MMHG

## 2022-10-18 PROCEDURE — 99215 OFFICE O/P EST HI 40 MIN: CPT

## 2022-10-18 PROCEDURE — 93000 ELECTROCARDIOGRAM COMPLETE: CPT | Mod: 59

## 2022-10-18 RX ORDER — HYDROCHLOROTHIAZIDE 25 MG/1
25 TABLET ORAL DAILY
Qty: 90 | Refills: 1 | Status: DISCONTINUED | COMMUNITY
Start: 2021-05-17 | End: 2022-10-18

## 2022-10-18 RX ORDER — BUDESONIDE 3 MG/1
3 CAPSULE, COATED PELLETS ORAL
Qty: 90 | Refills: 3 | Status: DISCONTINUED | COMMUNITY
Start: 2018-11-08 | End: 2022-10-18

## 2022-10-18 RX ORDER — DICYCLOMINE HYDROCHLORIDE 20 MG/1
20 TABLET ORAL
Qty: 40 | Refills: 0 | Status: DISCONTINUED | COMMUNITY
Start: 2022-10-14

## 2022-10-18 NOTE — HISTORY OF PRESENT ILLNESS
[de-identified] : PROCEDURE DATE: 10/01/2022    INTERPRETATION: CLINICAL INFORMATION: Diarrhea and abdominal pain  COMPARISON: CT abdomen pelvis 11/26/2019  CONTRAST/COMPLICATIONS: IV Contrast: Omnipaque 350 90 cc administered 10 cc discarded Oral Contrast: Smoothie Readi-Cat 2 Complications: None reported at time of study completion  PROCEDURE: CT of the Abdomen and Pelvis was performed. Sagittal and coronal reformats were performed.  FINDINGS: LOWER CHEST: Tiny hiatal hernia.  LIVER: Subcentimeter indeterminant hypodense focus in the right hepatic lobe, stable.. BILE DUCTS: Normal caliber. GALLBLADDER: Within normal limits. SPLEEN: Within normal limits. PANCREAS: Within normal limits. ADRENALS: Within normal limits. KIDNEYS/URETERS: Within normal limits.  BLADDER: Within normal limits. REPRODUCTIVE ORGANS: Fibroid uterus. No adnexal mass.  BOWEL: Surgical clips at the GE junction. No bowel obstruction. Diverticulosis coli. PERITONEUM: No ascites. VESSELS: Atherosclerotic changes. RETROPERITONEUM/LYMPH NODES: No lymphadenopathy. ABDOMINAL WALL: Within normal limits. BONES: Degenerative disc disease.  IMPRESSION: No evidence of active bowel inflammation.    --- End of Report ---       SADI GOTTI MD; Attending Radiologist This document has been electronically signed. Oct 4 2022 3:28PM

## 2022-10-19 ENCOUNTER — APPOINTMENT (OUTPATIENT)
Dept: GASTROENTEROLOGY | Facility: CLINIC | Age: 87
End: 2022-10-19

## 2022-10-19 VITALS
DIASTOLIC BLOOD PRESSURE: 62 MMHG | HEART RATE: 68 BPM | RESPIRATION RATE: 14 BRPM | WEIGHT: 167 LBS | SYSTOLIC BLOOD PRESSURE: 118 MMHG | HEIGHT: 62 IN | OXYGEN SATURATION: 98 % | BODY MASS INDEX: 30.73 KG/M2

## 2022-10-19 PROCEDURE — 99214 OFFICE O/P EST MOD 30 MIN: CPT

## 2022-10-19 RX ORDER — HYDRALAZINE HYDROCHLORIDE 10 MG/1
10 TABLET ORAL
Qty: 180 | Refills: 2 | Status: DISCONTINUED | COMMUNITY
Start: 2021-06-03 | End: 2022-10-19

## 2022-10-19 NOTE — ASSESSMENT
[FreeTextEntry1] : Due to her ongoing syncopal episodes in the recent past with normal fecal calprotectin level and imaging, I doubt this is any significant GI problem.  However she will benefit from a colonoscopy once her cardiology evaluation has been completed I have recommended to consider using cholestyramine at this time.  Patient will follow-up with me on a telephonic platform visit.  Will recommend to obtain fecal elastase level and qualitative stool fat panel.  Extensive counseling was performed.\par \par Elan Cobb MD\par Gastroenterology \par \par

## 2022-10-19 NOTE — HISTORY OF PRESENT ILLNESS
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  He continues to experience some diarrhea.  He called explosive diarrhea.  She has passed out in the recent past and was dehydrated and given IV fluids.  She is being evaluated by cardiology and has a Holter monitor in place.  Imaging on 2 successive occasion in the emergency room has been unimpressive.  Labs were unimpressive.  No rectal bleeding.  Has been taking mesalamine enema as advised by Florinda Fulton. [de-identified] : PROCEDURE DATE: 10/13/2022\par \par \par \par INTERPRETATION: CLINICAL INFORMATION: Abdominal pain\par \par COMPARISON: CT abdomen pelvis 10/1/2022\par \par CONTRAST/COMPLICATIONS:\par IV Contrast: Omnipaque 350 95 cc administered 0 cc discarded\par Oral Contrast: NONE\par Complications: None reported at time of study completion\par \par PROCEDURE:\par CT of the Abdomen and Pelvis was performed.\par Sagittal and coronal reformats were performed.\par \par FINDINGS:\par LOWER CHEST: Small hiatal hernia. Surgical clips at the GE junction.\par \par LIVER: Stable tiny indeterminant subcentimeter hypodense focus in the right hepatic lobe..\par BILE DUCTS: Normal caliber.\par GALLBLADDER: Within normal limits.\par SPLEEN: Stable calcification in the superior spleen..\par PANCREAS: Within normal limits.\par ADRENALS: Within normal limits.\par KIDNEYS/URETERS: Tiny indeterminant bilateral hypodense foci, too small to characterize. Symmetric bilateral renal enhancement without hydronephrosis..\par \par BLADDER: Within normal limits.\par REPRODUCTIVE ORGANS: Fibroid uterus. No adnexal mass.\par \par BOWEL: No bowel obstruction. Diverticulosis coli. No evidence for active bowel inflammation. Appendix is not visualized. No pericecal inflammatory change is seen.\par PERITONEUM: No ascites.\par VESSELS: Atherosclerotic changes.\par RETROPERITONEUM/LYMPH NODES: No lymphadenopathy.\par ABDOMINAL WALL: Within normal limits.\par BONES: Degenerative disc disease with minimal anterolisthesis of L4 on L5.\par \par \par IMPRESSION:\par No acute abdominal pathology.\par \par No significant interval change\par \par \par \par \par --- End of Report ---\par \par \par \par \par \par SADI GOTTI MD; Attending Radiologist\par This document has been electronically signed. Oct 13 2022 6:23PM

## 2022-10-19 NOTE — PHYSICAL EXAM

## 2022-10-26 ENCOUNTER — APPOINTMENT (OUTPATIENT)
Dept: CARDIOLOGY | Facility: CLINIC | Age: 87
End: 2022-10-26

## 2022-10-26 LAB
G LAMBLIA AG STL QL: NORMAL
GI PCR PANEL: NOT DETECTED

## 2022-10-26 PROCEDURE — 93306 TTE W/DOPPLER COMPLETE: CPT

## 2022-10-27 LAB — BACTERIA STL CULT: NORMAL

## 2022-10-28 ENCOUNTER — APPOINTMENT (OUTPATIENT)
Dept: INTERNAL MEDICINE | Facility: CLINIC | Age: 87
End: 2022-10-28

## 2022-10-31 LAB
FAT STL QN: NORMAL
FAT STL QN: NORMAL
PANCREATIC ELASTASE, FECAL: >500

## 2022-11-02 ENCOUNTER — APPOINTMENT (OUTPATIENT)
Dept: INTERNAL MEDICINE | Facility: CLINIC | Age: 87
End: 2022-11-02

## 2022-11-02 VITALS
DIASTOLIC BLOOD PRESSURE: 90 MMHG | WEIGHT: 163 LBS | RESPIRATION RATE: 14 BRPM | SYSTOLIC BLOOD PRESSURE: 160 MMHG | HEIGHT: 62 IN | OXYGEN SATURATION: 98 % | HEART RATE: 68 BPM | BODY MASS INDEX: 30 KG/M2

## 2022-11-02 DIAGNOSIS — Z13.31 ENCOUNTER FOR SCREENING FOR DEPRESSION: ICD-10-CM

## 2022-11-02 DIAGNOSIS — E87.1 HYPO-OSMOLALITY AND HYPONATREMIA: ICD-10-CM

## 2022-11-02 PROCEDURE — 36415 COLL VENOUS BLD VENIPUNCTURE: CPT

## 2022-11-02 PROCEDURE — G0439: CPT

## 2022-11-02 PROCEDURE — G0444 DEPRESSION SCREEN ANNUAL: CPT

## 2022-11-02 NOTE — HISTORY OF PRESENT ILLNESS
[FreeTextEntry1] : 87-year-old female with history of asthma, mild, nonobstructive CAD, hypertension, hyperlipidemia and osteopenia presents for her annual wellness visit\par history also includes prediabetes and hypertension.\par \par The patient is most significant issue over the past year has been a GI issue with recurrent abdominal cramps and pain with frequent diarrhea.  She is following with gastroenterology and recently put on cholestyramine with benefit.\par Along with this the patient has had multiple episodes of presyncope and syncope with hypotension.  Prior to this her blood pressure was difficult to control with added medications.  Currently following with cardiology with hydrochlorothiazide and hydralazine discontinued.  She continues on lisinopril and isradapine.  Now she is getting higher blood pressures.\par Cardiac work-up being done including monitoring and nuclear stress test.\par \par  She had COVID infection October 9, 2021 and received a monoclonal antibody infusion. She has been vaccinated. She has a residual dry cough so pulmonary treated her with steroids with improvement.\par \par The patient has lumbar degenerative disc disease with recent MRI showing similar findings and back surgery recommending conservative treatment \par \par The patient has 2 significant chronic medical issues.\par -Patient with chronic dyspnea on exertion for which she has had pulmonary evaluation which was fine with controlled asthma and thought it was secondary to cardiac issue.\par The patient saw cardiology with extensive workup September 2019 including normal echo and cardiac catheterization showing nonobstructive CAD with cardiology thinking was pulmonary.\par Patient symptoms are stable without any worsening and likely secondary to deconditioning.\par \par -Patient diagnosed with segmental colitis 2018 improved on mesalamine.\par Current on no treatment and symptoms are stable.\par The patient did have an episode of acute diverticulitis November 2019 treated by GI with resolution\par \par She had a sleep study 2020 positive for mild JOSE with desaturation with patient having seen the sleep specialist recommending CPAP which the patient declines.\par \par Patient's only issue is 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 July 2018.\par \par the patient had recurrent dizziness and vertigo therefore had imaging of the brain finding a meningioma and following with neurosurgery with most recent MRI September 2021 being stable therefore followup in one year.\par The patient was to see neurosurgery September 2022 but secondary to her other issues she is postponing this.\par \par Status post one episode of gout of her toe last year without recurrence\par

## 2022-11-02 NOTE — HEALTH RISK ASSESSMENT
[Never] : Never [Yes] : Yes [Monthly or less (1 pt)] : Monthly or less (1 point) [1 or 2 (0 pts)] : 1 or 2 (0 points) [Never (0 pts)] : Never (0 points) [No] : In the past 12 months have you used drugs other than those required for medical reasons? No [No falls in past year] : Patient reported no falls in the past year [0] : 2) Feeling down, depressed, or hopeless: Not at all (0) [None] : None [Alone] : lives alone [Retired] : retired [College] : College [] :  [# Of Children ___] : has [unfilled] children [Sexually Active] : sexually active [Feels Safe at Home] : Feels safe at home [Fully functional (bathing, dressing, toileting, transferring, walking, feeding)] : Fully functional (bathing, dressing, toileting, transferring, walking, feeding) [Fully functional (using the telephone, shopping, preparing meals, housekeeping, doing laundry, using] : Fully functional and needs no help or supervision to perform IADLs (using the telephone, shopping, preparing meals, housekeeping, doing laundry, using transportation, managing medications and managing finances) [Smoke Detector] : smoke detector [Carbon Monoxide Detector] : carbon monoxide detector [Seat Belt] :  uses seat belt [Sunscreen] : uses sunscreen [Reviewed no changes] : Reviewed, no changes [Discussed at today's visit] : Advance Directives Discussed at today's visit [Designated Healthcare Proxy] : Designated healthcare proxy [Name: ___] : Health Care Proxy's Name: [unfilled]  [Fair] : ~his/her~ current health as fair  [Good] : ~his/her~  mood as  good [PHQ-2 Negative - No further assessment needed] : PHQ-2 Negative - No further assessment needed [Audit-CScore] : 1 [de-identified] : occ walking [de-identified] : done [[id number]] : 0 [Change in mental status noted] : No change in mental status noted [Reports changes in hearing] : Reports no changes in hearing [Reports changes in vision] : Reports no changes in vision [Reports changes in dental health] : Reports no changes in dental health [Guns at Home] : no guns at home [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

## 2022-11-02 NOTE — ASSESSMENT
[FreeTextEntry1] : 87-year-old female who continues quite active but significant present issues or GI with persistent abdominal symptoms including epigastric abdominal pain and diarrhea.  Diarrhea seems to be improved with cholestyramine.\par Follow-up with GI as scheduled.\par \par Patient also with recurrent episodes of syncope/presyncope with cardiac work-up in progress.\par Some of this associated with hypotension with patient recently uncontrolled hypertension with added medications.\par With these episodes hydralazine and hydrochlorothiazide have been discontinued.\par Blood pressure today mildly elevated but okay.\par Follow-up with cardiology as scheduled.\par \par Patient with chronic dyspnea on exertion which is relatively mild and not worsening.\par Negative cardiac including cardiac catheterization and pulmonary evaluations.\par Likely secondary to deconditioning.\par \par Status post COVID infection October 9, 2021 with monoclonal antibody infusion and resolution of symptoms.\par \par Colitis diagnosed 2018 \par Status post acute diverticulitis November 2019 with resolution.\par \par  Asthma is controlled with her Advair and Singulair and her GERD is controlled without meds.. \par \par Patient is to continue present treatment.\par \par Cardiology followup as scheduled\par Mammography March 2022\par Bone density august 2019\par Colonoscopy 2009 without followup needed unless any issue. Sigmoidoscopy May 2018\par GYN \par \par Vaccines up to date including patient already having received influenza and COVID vaccines\par Shingrix discussed\par Venipuncture done today in the office\par Followup in 3 months and as needed\par \par

## 2022-11-02 NOTE — REVIEW OF SYSTEMS
[SOB on Exertion] : shortness of breath during exertion [Joint Pain] : joint pain [Negative] : Heme/Lymph [Abdominal Pain] : abdominal pain [Vomiting] : no vomiting [Constipation] : no constipation [Diarrhea] : diarrhea [Heartburn] : no heartburn [Melena] : no melena [As Noted in HPI] : as noted in HPI [Fainting] : fainting

## 2022-11-03 ENCOUNTER — APPOINTMENT (OUTPATIENT)
Dept: GASTROENTEROLOGY | Facility: CLINIC | Age: 87
End: 2022-11-03

## 2022-11-03 ENCOUNTER — NON-APPOINTMENT (OUTPATIENT)
Age: 87
End: 2022-11-03

## 2022-11-03 LAB
25(OH)D3 SERPL-MCNC: 60.7 NG/ML
ALBUMIN SERPL ELPH-MCNC: 4.4 G/DL
ALP BLD-CCNC: 86 U/L
ALT SERPL-CCNC: 32 U/L
ANION GAP SERPL CALC-SCNC: 13 MMOL/L
APPEARANCE: CLEAR
AST SERPL-CCNC: 24 U/L
BACTERIA: NEGATIVE
BASOPHILS # BLD AUTO: 0.07 K/UL
BASOPHILS NFR BLD AUTO: 1 %
BILIRUB SERPL-MCNC: 0.2 MG/DL
BILIRUBIN URINE: NEGATIVE
BLOOD URINE: NEGATIVE
BUN SERPL-MCNC: 14 MG/DL
CALCIUM SERPL-MCNC: 9.4 MG/DL
CHLORIDE SERPL-SCNC: 98 MMOL/L
CHOLEST SERPL-MCNC: 191 MG/DL
CO2 SERPL-SCNC: 22 MMOL/L
COLOR: NORMAL
CREAT SERPL-MCNC: 0.52 MG/DL
EGFR: 90 ML/MIN/1.73M2
EOSINOPHIL # BLD AUTO: 0.11 K/UL
EOSINOPHIL NFR BLD AUTO: 1.6 %
ESTIMATED AVERAGE GLUCOSE: 108 MG/DL
GLUCOSE QUALITATIVE U: NEGATIVE
GLUCOSE SERPL-MCNC: 97 MG/DL
HBA1C MFR BLD HPLC: 5.4 %
HCT VFR BLD CALC: 40.3 %
HDLC SERPL-MCNC: 69 MG/DL
HGB BLD-MCNC: 13.3 G/DL
HYALINE CASTS: 0 /LPF
IMM GRANULOCYTES NFR BLD AUTO: 1 %
KETONES URINE: NEGATIVE
LDLC SERPL CALC-MCNC: 104 MG/DL
LEUKOCYTE ESTERASE URINE: NEGATIVE
LYMPHOCYTES # BLD AUTO: 1.39 K/UL
LYMPHOCYTES NFR BLD AUTO: 20.8 %
MAN DIFF?: NORMAL
MCHC RBC-ENTMCNC: 29 PG
MCHC RBC-ENTMCNC: 33 GM/DL
MCV RBC AUTO: 87.8 FL
MICROSCOPIC-UA: NORMAL
MONOCYTES # BLD AUTO: 0.41 K/UL
MONOCYTES NFR BLD AUTO: 6.1 %
NEUTROPHILS # BLD AUTO: 4.63 K/UL
NEUTROPHILS NFR BLD AUTO: 69.5 %
NITRITE URINE: NEGATIVE
NONHDLC SERPL-MCNC: 122 MG/DL
PH URINE: 6
PLATELET # BLD AUTO: 361 K/UL
POTASSIUM SERPL-SCNC: 4.5 MMOL/L
PROT SERPL-MCNC: 6.9 G/DL
PROTEIN URINE: NEGATIVE
RBC # BLD: 4.59 M/UL
RBC # FLD: 14.1 %
RED BLOOD CELLS URINE: 1 /HPF
SODIUM SERPL-SCNC: 134 MMOL/L
SPECIFIC GRAVITY URINE: 1.02
SQUAMOUS EPITHELIAL CELLS: 1 /HPF
T4 FREE SERPL-MCNC: 1.2 NG/DL
TRIGL SERPL-MCNC: 90 MG/DL
TSH SERPL-ACNC: 2.85 UIU/ML
UROBILINOGEN URINE: NORMAL
WBC # FLD AUTO: 6.68 K/UL
WHITE BLOOD CELLS URINE: 1 /HPF

## 2022-11-03 PROCEDURE — 99441: CPT | Mod: 95

## 2022-11-04 ENCOUNTER — NON-APPOINTMENT (OUTPATIENT)
Age: 87
End: 2022-11-04

## 2022-11-04 NOTE — ASSESSMENT
[FreeTextEntry1] : Having multitude of symptoms.  There are no alarming signs.  She has responded nicely to cholestyramine therapy.  I will place her on PPI therapy due to complaints of burping.  She has responded well in the past for PPI.  We will telephonic in the next 2 weeks.  Continue cholestyramine.\par \par I spent 10 minutes on the encounter\par \par \par Elan Cobb MD\par Gastroenterology \par \par

## 2022-11-04 NOTE — HISTORY OF PRESENT ILLNESS
[FreeTextEntry1] : Due to COVID 19 pandemic, telephonic visit was scheduled to decrease any chance of exposure. Verbal consent was obtained from the patient.\par The patient was at home and I was at [facility].  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  Then she was having some diarrhea.  She was complaining of explosive diarrhea.  However the CT abdominal have been unimpressive.  She underwent stool studies including fecal elastase level which has been unimpressive.  She was started on cholestyramine.  That has helped with the diarrhea.  She also having some cardiac issues and hypertension.  She is now complaining of burping.  She had been on PPI therapy in the past.

## 2022-11-07 ENCOUNTER — APPOINTMENT (OUTPATIENT)
Dept: CARDIOLOGY | Facility: CLINIC | Age: 87
End: 2022-11-07

## 2022-11-07 PROCEDURE — 93015 CV STRESS TEST SUPVJ I&R: CPT

## 2022-11-07 PROCEDURE — A9500: CPT

## 2022-11-07 PROCEDURE — 78452 HT MUSCLE IMAGE SPECT MULT: CPT

## 2022-11-08 ENCOUNTER — APPOINTMENT (OUTPATIENT)
Dept: GASTROENTEROLOGY | Facility: CLINIC | Age: 87
End: 2022-11-08

## 2022-11-08 VITALS
HEIGHT: 62 IN | RESPIRATION RATE: 14 BRPM | OXYGEN SATURATION: 98 % | BODY MASS INDEX: 30 KG/M2 | HEART RATE: 72 BPM | SYSTOLIC BLOOD PRESSURE: 140 MMHG | WEIGHT: 163 LBS | DIASTOLIC BLOOD PRESSURE: 90 MMHG

## 2022-11-08 DIAGNOSIS — R14.2 ERUCTATION: ICD-10-CM

## 2022-11-08 PROCEDURE — 99213 OFFICE O/P EST LOW 20 MIN: CPT

## 2022-11-10 PROBLEM — R14.2 BURPING: Status: ACTIVE | Noted: 2022-11-03

## 2022-11-10 NOTE — PHYSICAL EXAM

## 2022-11-10 NOTE — ASSESSMENT
[FreeTextEntry1] : The patient is doing much better with cholestyramine and PPI therapy.  I provided reassurance to the patient.  CT abdomen lately has been unimpressive.  All the blood test and stool studies have been unimpressive.  Follow-up with me in 2 weeks on telephonic platform.\par \par Elan Cobb MD\par Gastroenterology \par \par

## 2022-11-10 NOTE — HISTORY OF PRESENT ILLNESS
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 Then she was having some diarrhea. She was complaining of explosive diarrhea. However the CT abdominal have been unimpressive. She underwent stool studies including fecal elastase level which has been unimpressive. She was started on cholestyramine. That has helped with the diarrhea. She also having some cardiac issues and hypertension. She is now complaining of burping. She had been on PPI therapy in the past. \par She was started on PPI therapy last week.  Her upper GI symptoms and diarrhea has improved.  She is complaining of burning sensation inside and cold sensations over the body.

## 2022-11-21 ENCOUNTER — OUTPATIENT (OUTPATIENT)
Dept: OUTPATIENT SERVICES | Facility: HOSPITAL | Age: 87
LOS: 1 days | End: 2022-11-21
Payer: MEDICARE

## 2022-11-21 ENCOUNTER — APPOINTMENT (OUTPATIENT)
Dept: MRI IMAGING | Facility: CLINIC | Age: 87
End: 2022-11-21

## 2022-11-21 DIAGNOSIS — G93.9 DISORDER OF BRAIN, UNSPECIFIED: ICD-10-CM

## 2022-11-21 PROCEDURE — 70553 MRI BRAIN STEM W/O & W/DYE: CPT | Mod: 26,MH

## 2022-11-21 PROCEDURE — 70553 MRI BRAIN STEM W/O & W/DYE: CPT

## 2022-11-21 PROCEDURE — A9585: CPT

## 2022-11-28 ENCOUNTER — RX RENEWAL (OUTPATIENT)
Age: 87
End: 2022-11-28

## 2022-11-28 ENCOUNTER — NON-APPOINTMENT (OUTPATIENT)
Age: 87
End: 2022-11-28

## 2022-12-01 ENCOUNTER — APPOINTMENT (OUTPATIENT)
Dept: NEUROSURGERY | Facility: CLINIC | Age: 87
End: 2022-12-01

## 2022-12-01 VITALS
SYSTOLIC BLOOD PRESSURE: 120 MMHG | HEIGHT: 62 IN | BODY MASS INDEX: 30.36 KG/M2 | OXYGEN SATURATION: 96 % | DIASTOLIC BLOOD PRESSURE: 73 MMHG | HEART RATE: 67 BPM | TEMPERATURE: 97.8 F | WEIGHT: 165 LBS

## 2022-12-01 DIAGNOSIS — G93.9 DISORDER OF BRAIN, UNSPECIFIED: ICD-10-CM

## 2022-12-01 PROCEDURE — 99213 OFFICE O/P EST LOW 20 MIN: CPT

## 2022-12-05 NOTE — ASSESSMENT
[FreeTextEntry1] : Ms. Rosa presents with above history and imaging. She is neurologically intact.  I have personally reviewed her MRI head w/wo contrast when compared to 11/2021 does not reveal any significant change.  I believe it is unlikely the cause of her vertigo. 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 11/2022\par Maintain good hydration. \par ENT specialist referral suggested but she declined. \par Cardiology followup \par  Continue BP medications as ordered to maintained BP <140/90. Continue statin therapy with an LDL goal of < 70 for secondary stroke prevention.\par f/u after imaging.\par Patient has been given an opportunity to ask and have their questions answered to their satisfaction.\par Patient knows to call the office if there are any new or worsening symptoms.\par  \par I, Dr. Baljeet Viveros, personally performed the evaluation and management (E/M) services for this patient.  That E/M includes assessment of the initial examination, assessing the pertinent medical and surgical history, and establishing the plan of care.  At the time of the visit, my ACP, Jessica Gutierrez, actively participated in the evaluation and management services for this patient to be followed going forward in accordance with the plan documented in the clinical note.\par \par

## 2022-12-05 NOTE — REVIEW OF SYSTEMS
[As Noted in HPI] : as noted in HPI [Dizziness] : dizziness [Vertigo] : vertigo [Tension Headache] : tension-type headaches [Negative] : Heme/Lymph [Seizures] : no convulsions [Difficulty Walking] : no difficulty walking
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
## 2022-12-05 NOTE — DATA REVIEWED
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 Follow-up, continued headache and dizziness with vertigo\par \par TECHNIQUE: Multiplanar imaging was performed using T1 weighted, T2 weighted and FLAIR sequences. Diffusion weighted and SWI images were also obtained. Following intravenous gadolinium, multiplanar T1 weighted images were performed. 7.5 cc Gadavist were administered. 0 cc were discarded.\par \par COMPARISON EXAMINATION: 4/8/2021\par \par FINDINGS:\par VENTRICLES AND SULCI: Age-appropriate involutional changes as seen on the prior study..\par INTRA-AXIAL: At the level of the sarah evidence of a lesion that has low signal T2 with lower signal T1 without surrounding edema or mass effect may represent a focus of prior hemorrhage versus a cavernous malformation. No associated enhancement. Second focus in the right lateral temporal parietal region also may represent a second cavernous malformation versus a prior hemorrhage possibly on the basis of amyloid as a consideration or hypertension. 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
The patient is a 49y Male complaining of

## 2022-12-20 ENCOUNTER — OFFICE (OUTPATIENT)
Dept: URBAN - METROPOLITAN AREA CLINIC 115 | Facility: CLINIC | Age: 87
Setting detail: OPHTHALMOLOGY
End: 2022-12-20
Payer: MEDICARE

## 2022-12-20 DIAGNOSIS — H01.001: ICD-10-CM

## 2022-12-20 DIAGNOSIS — H35.431: ICD-10-CM

## 2022-12-20 DIAGNOSIS — H01.004: ICD-10-CM

## 2022-12-20 DIAGNOSIS — H43.813: ICD-10-CM

## 2022-12-20 DIAGNOSIS — H35.3121: ICD-10-CM

## 2022-12-20 DIAGNOSIS — H40.013: ICD-10-CM

## 2022-12-20 DIAGNOSIS — H35.033: ICD-10-CM

## 2022-12-20 PROCEDURE — 92014 COMPRE OPH EXAM EST PT 1/>: CPT | Performed by: OPHTHALMOLOGY

## 2022-12-20 PROCEDURE — 92133 CPTRZD OPH DX IMG PST SGM ON: CPT | Performed by: OPHTHALMOLOGY

## 2022-12-20 ASSESSMENT — LID EXAM ASSESSMENTS
OD_BLEPHARITIS: RUL T
OS_BLEPHARITIS: LUL T

## 2022-12-20 ASSESSMENT — CONFRONTATIONAL VISUAL FIELD TEST (CVF)
OS_FINDINGS: FULL
OD_FINDINGS: FULL

## 2022-12-20 ASSESSMENT — REFRACTION_AUTOREFRACTION
OD_CYLINDER: -1.50
OS_SPHERE: -1.50
OS_CYLINDER: -0.50
OD_AXIS: 120
OS_AXIS: 100
OD_SPHERE: -0.75

## 2022-12-20 ASSESSMENT — REFRACTION_CURRENTRX
OS_VPRISM_DIRECTION: BF
OD_VPRISM_DIRECTION: BF
OD_AXIS: 105
OS_CYLINDER: -0.50
OS_OVR_VA: 20/
OD_ADD: +2.75
OD_OVR_VA: 20/
OS_SPHERE: -1.25
OS_AXIS: 086
OD_CYLINDER: -1.00
OS_ADD: +2.75
OD_SPHERE: -1.00

## 2022-12-20 ASSESSMENT — REFRACTION_MANIFEST
OS_SPHERE: -1.25
OD_AXIS: 105
OS_AXIS: 075
OS_ADD: +2.50
OS_CYLINDER: -0.50
OD_ADD: +2.50
OS_VA1: 20/20
OD_VA1: 20/25
OD_CYLINDER: -1.00
OD_SPHERE: -1.00

## 2022-12-20 ASSESSMENT — PACHYMETRY
OD_CT_UM: 599
OD_CT_CORRECTION: -4
OS_CT_CORRECTION: -4
OS_CT_UM: 590

## 2022-12-20 ASSESSMENT — TONOMETRY
OD_IOP_MMHG: 21
OS_IOP_MMHG: 19

## 2022-12-20 ASSESSMENT — SPHEQUIV_DERIVED
OS_SPHEQUIV: -1.5
OD_SPHEQUIV: -1.5
OS_SPHEQUIV: -1.75
OD_SPHEQUIV: -1.5

## 2022-12-20 ASSESSMENT — VISUAL ACUITY
OS_BCVA: 20/20
OD_BCVA: 20/20-1

## 2022-12-20 ASSESSMENT — KERATOMETRY: METHOD_AUTO_MANUAL: MANUAL

## 2022-12-24 ENCOUNTER — NON-APPOINTMENT (OUTPATIENT)
Age: 87
End: 2022-12-24

## 2022-12-27 ENCOUNTER — APPOINTMENT (OUTPATIENT)
Dept: CARDIOLOGY | Facility: CLINIC | Age: 87
End: 2022-12-27

## 2022-12-27 VITALS
HEART RATE: 69 BPM | TEMPERATURE: 98.3 F | WEIGHT: 166 LBS | SYSTOLIC BLOOD PRESSURE: 148 MMHG | HEIGHT: 62 IN | OXYGEN SATURATION: 95 % | BODY MASS INDEX: 30.55 KG/M2 | DIASTOLIC BLOOD PRESSURE: 78 MMHG

## 2022-12-27 DIAGNOSIS — R55 SYNCOPE AND COLLAPSE: ICD-10-CM

## 2022-12-27 PROCEDURE — 99215 OFFICE O/P EST HI 40 MIN: CPT

## 2022-12-27 RX ORDER — NORMAL SALT TABLETS 1 G/G
1 TABLET ORAL
Qty: 90 | Refills: 1 | Status: DISCONTINUED | COMMUNITY
Start: 2022-10-06 | End: 2022-12-27

## 2022-12-27 RX ORDER — CHOLESTYRAMINE 4 G/9G
4 POWDER, FOR SUSPENSION ORAL DAILY
Qty: 30 | Refills: 1 | Status: DISCONTINUED | COMMUNITY
Start: 2022-10-19 | End: 2022-12-27

## 2022-12-27 RX ORDER — NEBIVOLOL HYDROCHLORIDE 20 MG/1
20 TABLET ORAL DAILY
Refills: 0 | Status: ACTIVE | COMMUNITY

## 2022-12-27 RX ORDER — MESALAMINE 4 G/60ML
4 ENEMA RECTAL
Qty: 1680 | Refills: 0 | Status: DISCONTINUED | COMMUNITY
Start: 2022-09-29 | End: 2022-12-27

## 2022-12-27 RX ORDER — MESALAMINE 4 G/60ML
4 SUSPENSION RECTAL DAILY
Qty: 30 | Refills: 3 | Status: DISCONTINUED | COMMUNITY
Start: 2022-09-29 | End: 2022-12-27

## 2023-01-17 ENCOUNTER — APPOINTMENT (OUTPATIENT)
Dept: GASTROENTEROLOGY | Facility: CLINIC | Age: 88
End: 2023-01-17
Payer: MEDICARE

## 2023-01-17 VITALS
HEART RATE: 70 BPM | SYSTOLIC BLOOD PRESSURE: 140 MMHG | DIASTOLIC BLOOD PRESSURE: 80 MMHG | BODY MASS INDEX: 30.55 KG/M2 | HEIGHT: 62 IN | OXYGEN SATURATION: 97 % | RESPIRATION RATE: 14 BRPM | WEIGHT: 166 LBS

## 2023-01-17 DIAGNOSIS — Z87.19 PERSONAL HISTORY OF OTHER DISEASES OF THE DIGESTIVE SYSTEM: ICD-10-CM

## 2023-01-17 DIAGNOSIS — K44.9 DIAPHRAGMATIC HERNIA W/OUT OBSTRUCTION OR GANGRENE: ICD-10-CM

## 2023-01-17 DIAGNOSIS — Z09 ENCOUNTER FOR FOLLOW-UP EXAMINATION AFTER COMPLETED TREATMENT FOR CONDITIONS OTHER THAN MALIGNANT NEOPLASM: ICD-10-CM

## 2023-01-17 DIAGNOSIS — K21.9 DIAPHRAGMATIC HERNIA W/OUT OBSTRUCTION OR GANGRENE: ICD-10-CM

## 2023-01-17 DIAGNOSIS — R19.7 DIARRHEA, UNSPECIFIED: ICD-10-CM

## 2023-01-17 LAB
5OH-INDOLEACETATE UR-SCNC: 15.2 MG/G CREAT
CREAT ?TM UR-MCNC: 116 MG/DL

## 2023-01-17 PROCEDURE — 99214 OFFICE O/P EST MOD 30 MIN: CPT

## 2023-01-17 NOTE — PHYSICAL EXAM
[Alert] : alert [Normal Voice/Communication] : normal voice/communication [Healthy Appearing] : healthy appearing [No Acute Distress] : no acute distress [Sclera] : the sclera and conjunctiva were normal [Hearing Threshold Finger Rub Not [place]] : hearing was normal [Normal Lips/Gums] : the lips and gums were normal [Oropharynx] : the oropharynx was normal [Normal Appearance] : the appearance of the neck was normal [No Neck Mass] : no neck mass was observed [No Respiratory Distress] : no respiratory distress [No Acc Muscle Use] : no accessory muscle use [Respiration, Rhythm And Depth] : normal respiratory rhythm and effort [Auscultation Breath Sounds / Voice Sounds] : lungs were clear to auscultation bilaterally [Heart Rate And Rhythm] : heart rate was normal and rhythm regular [Normal S1, S2] : normal S1 and S2 [Murmurs] : no murmurs [Bowel Sounds] : normal bowel sounds [Abdomen Tenderness] : non-tender [No Masses] : no abdominal mass palpated [Abdomen Soft] : soft [] : no hepatosplenomegaly [Oriented To Time, Place, And Person] : oriented to person, place, and time

## 2023-01-17 NOTE — HISTORY OF PRESENT ILLNESS
[FreeTextEntry1] : The patient has arrived for a follow-up.She has history of colitis and had been treated in the past with budesonide and mesalamine.  She has been having significant abdominal bloating and explosive diarrhea leading to multiple cardiac event including vasovagal events.  She has been evaluated extensively by cardiology which has been unrevealing for any syncopal episodes.  There was a concern whether she has carcinoid.  The urine 5-HIAA level is mildly elevated but that is a random sample.  She has started looking into her diet and has started eliminating certain foods including broccoli, [place] sprouts.  She has been feeling much better at this time.  She is also cleared for a colonoscopy by her cardiology team.  She has also eliminated certain fish.  She also has started using Metamucil.  She has diverticulosis.  She also is taking PPI therapy.

## 2023-01-17 NOTE — ASSESSMENT
[FreeTextEntry1] : The patient has multifactorial GI symptoms.  She has upper GI symptoms including reflux which is controlled with PPI therapy.  She was initially on 40 mg but I have always recommended 20 mg dose.  We will renew the prescription.  The patient will also benefit from dietary precautions which she has already implemented.  I am recommending to consider a colonoscopy to rule out any significant colitis.  The CT abdominal has been performed in the recent past has been unimpressive.  Also will check 24-hour 5-HIAA on the next time.\par \par The bowel preparation was discussed at length. Risks (including bleeding, pain, perforation, incomplete examination, splenic laceration, adverse reactions to medications, aspiration and death), benefits and alternatives were discussed. Patient is agreeable for the colonoscopy. The patient is medically optimized for the procedure. We will schedule the patient for the procedure. Bowel preparation was sent to the pharmacy.\par \par Elan Cobb MD\par Gastroenterology \par \par

## 2023-01-30 ENCOUNTER — NON-APPOINTMENT (OUTPATIENT)
Age: 88
End: 2023-01-30

## 2023-01-30 LAB
CGA SERPL-MCNC: 239.9 NG/ML
VIP SERPL-MCNC: <50 PG/ML

## 2023-02-02 LAB
5OH-INDOLEACETATE 24H UR-MRATE: 5.1 MG/24 H
5OH-INDOLEACETATE UR-MCNC: 0.82 G/24 H
SPECIMEN VOL 24H UR: 3000 ML

## 2023-03-01 ENCOUNTER — APPOINTMENT (OUTPATIENT)
Dept: INTERNAL MEDICINE | Facility: CLINIC | Age: 88
End: 2023-03-01
Payer: MEDICARE

## 2023-03-01 VITALS
DIASTOLIC BLOOD PRESSURE: 80 MMHG | RESPIRATION RATE: 13 BRPM | BODY MASS INDEX: 30.36 KG/M2 | HEART RATE: 61 BPM | WEIGHT: 166 LBS | SYSTOLIC BLOOD PRESSURE: 130 MMHG

## 2023-03-01 PROCEDURE — 36415 COLL VENOUS BLD VENIPUNCTURE: CPT

## 2023-03-01 PROCEDURE — 99214 OFFICE O/P EST MOD 30 MIN: CPT | Mod: 25

## 2023-03-01 NOTE — HISTORY OF PRESENT ILLNESS
[FreeTextEntry1] : Pt presents for followup on hypertension/hyperlipidemia/coronary artery disease. Patient is currently fasting for today's labs. Patient is currently on lisinopril/isradipine/bystolic/hydralazine for her hypertension, managing her hyperlipidemia dietarily and on ASA for coronary artery disease.\par -following with N/S for brain lesion/meningioma\par -got covid vaccines X5\par \par

## 2023-03-01 NOTE — ASSESSMENT
[FreeTextEntry1] : Venipuncture done in our office, Labs sent out and further recommendations will be made based on lab results. Patient advised to continue present medications with diet/exercise and specialist followup. Patient will return to the office in 3-4months\par \par Shingrix d/w pt, +got covid vaccines X5\par Mammogram-3/2022=rx given\par bone density =6/2022\par Colonoscopy was 2009, no need for follow up\par Sigmoidoscopy 11/2018\par  specialists which include..\par 1. cardiology-Dr. Strong\par 2. pulmonary-Dr. Andrea\par 3. dermatology-Dr. Del Valle\par 4. gynecology-Dr. Vaca\par 5. ophthalmology-Site MD\par 6..podiatry prn-Dr. Thomas\par 7. Orthopedic-Dr. Moreno\par 8.GI-Dr. Cobb\par 9.Chiro+\par 10.N/S-Dr. Shah/Dr. Viveros\par hepatitis C screening=4/2018\par cath 9/2019=nonobstructive CAD\par stress test 11/2022\par brain MRI via [facility]

## 2023-03-02 ENCOUNTER — NON-APPOINTMENT (OUTPATIENT)
Age: 88
End: 2023-03-02

## 2023-03-02 LAB
ALBUMIN SERPL ELPH-MCNC: 4.4 G/DL
ALP BLD-CCNC: 84 U/L
ALT SERPL-CCNC: 22 U/L
ANION GAP SERPL CALC-SCNC: 12 MMOL/L
AST SERPL-CCNC: 18 U/L
BASOPHILS # BLD AUTO: 0.06 K/UL
BASOPHILS NFR BLD AUTO: 0.9 %
BILIRUB SERPL-MCNC: 0.3 MG/DL
BUN SERPL-MCNC: 19 MG/DL
CALCIUM SERPL-MCNC: 9.9 MG/DL
CHLORIDE SERPL-SCNC: 97 MMOL/L
CHOLEST SERPL-MCNC: 215 MG/DL
CO2 SERPL-SCNC: 26 MMOL/L
CREAT SERPL-MCNC: 0.62 MG/DL
EGFR: 86 ML/MIN/1.73M2
EOSINOPHIL # BLD AUTO: 0.17 K/UL
EOSINOPHIL NFR BLD AUTO: 2.7 %
ESTIMATED AVERAGE GLUCOSE: 117 MG/DL
GLUCOSE SERPL-MCNC: 97 MG/DL
HBA1C MFR BLD HPLC: 5.7 %
HCT VFR BLD CALC: 42.5 %
HDLC SERPL-MCNC: 64 MG/DL
HGB BLD-MCNC: 13.5 G/DL
IMM GRANULOCYTES NFR BLD AUTO: 0.9 %
LDLC SERPL CALC-MCNC: 133 MG/DL
LYMPHOCYTES # BLD AUTO: 1.33 K/UL
LYMPHOCYTES NFR BLD AUTO: 21 %
MAN DIFF?: NORMAL
MCHC RBC-ENTMCNC: 28.2 PG
MCHC RBC-ENTMCNC: 31.8 GM/DL
MCV RBC AUTO: 88.9 FL
MONOCYTES # BLD AUTO: 0.58 K/UL
MONOCYTES NFR BLD AUTO: 9.1 %
NEUTROPHILS # BLD AUTO: 4.14 K/UL
NEUTROPHILS NFR BLD AUTO: 65.4 %
NONHDLC SERPL-MCNC: 151 MG/DL
PLATELET # BLD AUTO: 390 K/UL
POTASSIUM SERPL-SCNC: 4.9 MMOL/L
PROT SERPL-MCNC: 7 G/DL
RBC # BLD: 4.78 M/UL
RBC # FLD: 13.9 %
SODIUM SERPL-SCNC: 135 MMOL/L
TRIGL SERPL-MCNC: 89 MG/DL
TSH SERPL-ACNC: 3.2 UIU/ML
URATE SERPL-MCNC: 4.4 MG/DL
WBC # FLD AUTO: 6.34 K/UL

## 2023-04-04 ENCOUNTER — APPOINTMENT (OUTPATIENT)
Dept: RADIOLOGY | Facility: CLINIC | Age: 88
End: 2023-04-04
Payer: MEDICARE

## 2023-04-04 ENCOUNTER — OUTPATIENT (OUTPATIENT)
Dept: OUTPATIENT SERVICES | Facility: HOSPITAL | Age: 88
LOS: 1 days | End: 2023-04-04
Payer: MEDICARE

## 2023-04-04 ENCOUNTER — APPOINTMENT (OUTPATIENT)
Dept: PULMONOLOGY | Facility: CLINIC | Age: 88
End: 2023-04-04
Payer: MEDICARE

## 2023-04-04 VITALS
OXYGEN SATURATION: 97 % | RESPIRATION RATE: 16 BRPM | BODY MASS INDEX: 29.63 KG/M2 | SYSTOLIC BLOOD PRESSURE: 138 MMHG | DIASTOLIC BLOOD PRESSURE: 72 MMHG | WEIGHT: 161 LBS | HEART RATE: 75 BPM | HEIGHT: 62 IN

## 2023-04-04 DIAGNOSIS — E66.3 OVERWEIGHT: ICD-10-CM

## 2023-04-04 DIAGNOSIS — R05.9 COUGH, UNSPECIFIED: ICD-10-CM

## 2023-04-04 PROCEDURE — 71046 X-RAY EXAM CHEST 2 VIEWS: CPT | Mod: 26

## 2023-04-04 PROCEDURE — 71046 X-RAY EXAM CHEST 2 VIEWS: CPT

## 2023-04-04 PROCEDURE — 99214 OFFICE O/P EST MOD 30 MIN: CPT

## 2023-04-04 RX ORDER — POLYETHYLENE GLYCOL-3350 AND ELECTROLYTES 236; 6.74; 5.86; 2.97; 22.74 G/274.31G; G/274.31G; G/274.31G; G/274.31G; G/274.31G
236 POWDER, FOR SOLUTION ORAL
Qty: 1 | Refills: 0 | Status: DISCONTINUED | COMMUNITY
Start: 2023-01-17 | End: 2023-04-04

## 2023-04-04 NOTE — DISCUSSION/SUMMARY
[FreeTextEntry1] : \par #1. Continue Advair 250/50 for mild asthma; attempts at lowering the dose resulted in increased symptoms so will continue current dose\par #2. Montelukast nightly\par #3. Ventolin as needed\par #4. Diet and exercise for weight loss\par #5. SOBOE is likely related to weight and deconditioning\par #6. Z-nish and prednisone taper for URI/wheeze/cough; CXR was clear\par #7. F/u 2 months with PFTs\par #8. Pt had both Covid vaccines, Covid infection, booster and flu vaccines; recovered from prior Covid infection from 10/2021\par #9. 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 \par #10. ER if no better\par #11. Reviewed risks of exposure and symptoms of Covid-19 virus, including how the virus is spread and precautions to avoid jesús virus.\par \par The patient expressed understanding and agreement with the above recommendations/plan and accepts responsibility to be compliant with recommended testing, therapies, and f/u visits.\par All relevant questions and concerns were addressed.

## 2023-04-04 NOTE — REASON FOR VISIT
[Follow-Up] : a follow-up visit [Asthma] : asthma [Sleep Apnea] : sleep apnea [Cough] : cough [Wheezing] : wheezing [Obesity] : obesity [TextBox_44] : h/o Covid 19 infection

## 2023-04-04 NOTE — CONSULT LETTER
[Dear  ___] : Dear  [unfilled], [Consult Letter:] : I had the pleasure of evaluating your patient, [unfilled]. [Please see my note below.] : Please see my note below. [Consult Closing:] : Thank you very much for allowing me to participate in the care of this patient.  If you have any questions, please do not hesitate to contact me. [Sincerely,] : Sincerely, [DrValeria  ___] : Dr. RODRÍGUEZ [DrValeria ___] : Dr. RODRÍGUEZ [FreeTextEntry3] : Nikos Andrea MD, FCCP, D. ABSM\par Pulmonary and Sleep Medicine\par [facility] [place]\par

## 2023-04-04 NOTE — HISTORY OF PRESENT ILLNESS
[Mild Persistent] : mild persistent [Doing Well] : doing well [Well Controlled] : Well controlled [Adherent] : the patient is adherent with ~his/her~ medication regimen [Goals--Doing Well] : the patient is doing well with ~his/her~ asthma goals [PFTs] : pulmonary function tests [Excess Weight] : excess weight [Currently Experiencing] : The patient is currently experiencing symptoms. [Dyspnea] : dyspnea [Low Calorie Diet] : low calorie diet [Fair Compliance] : fair compliance with treatment [Fair Tolerance] : fair tolerance of treatment [Poor Symptom Control] : poor symptom control [Hypertension] : hypertension [High] : high [Low Calorie] : low calorie [Well Balanced Diet] : well balanced meals [Follow-Up - Routine Clinic] : a routine clinic follow-up of [Excessive Daytime Sleepiness] : excessive daytime sleepiness [Unrefreshing Sleep] : unrefreshing sleep [Sleepy When Sedentary] : sleepy when sedentary [None] : The patient is not currently being treated for this problem [TextBox_4] : The patient presents for evaluation for her upcoming catheterization.\par She c/o increased SOBOE despite no change in her lung function and had cardiac w/u which was not diagnostic.\par Pt was + for Covid on 10/9/21 and had the Monoclonal Ab infusion when she felt URI symptoms, fevers, H/a, body aches, cough but now with resolution of symptoms.\par Pt had HST for poor sleep and difficult to control HTN, especially in AM. Pt with mild JOSE but does not want to proceed with therapy. The patient understands the risks of untreated JOSE including heart disease, strokes, hypertension, pulmonary hypertension, and motor vehicle accidents as well as the need for treatment and weight loss. \par Pt reports one week h/o wheeze and cough.

## 2023-04-11 ENCOUNTER — RX RENEWAL (OUTPATIENT)
Age: 88
End: 2023-04-11

## 2023-04-25 ENCOUNTER — TRANSCRIPTION ENCOUNTER (OUTPATIENT)
Age: 88
End: 2023-04-25

## 2023-04-25 NOTE — PHYSICAL EXAM
[Alert] : alert [Normal Voice/Communication] : normal voice/communication [Healthy Appearing] : healthy appearing [No Acute Distress] : no acute distress [Sclera] : the sclera and conjunctiva were normal [Hearing Threshold Finger Rub Not [place]] : hearing was normal [Normal Lips/Gums] : the lips and gums were normal [Oropharynx] : the oropharynx was normal [Normal Appearance] : the appearance of the neck was normal [No Neck Mass] : no neck mass was observed [No Respiratory Distress] : no respiratory distress [No Acc Muscle Use] : no accessory muscle use [Respiration, Rhythm And Depth] : normal respiratory rhythm and effort [Auscultation Breath Sounds / Voice Sounds] : lungs were clear to auscultation bilaterally [Heart Rate And Rhythm] : heart rate was normal and rhythm regular [Normal S1, S2] : normal S1 and S2 [Murmurs] : no murmurs [Bowel Sounds] : normal bowel sounds [Abdomen Tenderness] : non-tender [No Masses] : no abdominal mass palpated [Abdomen Soft] : soft [] : no hepatosplenomegaly [Oriented To Time, Place, And Person] : oriented to person, place, and time

## 2023-04-26 ENCOUNTER — RESULT REVIEW (OUTPATIENT)
Age: 88
End: 2023-04-26

## 2023-04-26 ENCOUNTER — APPOINTMENT (OUTPATIENT)
Dept: GASTROENTEROLOGY | Facility: GI CENTER | Age: 88
End: 2023-04-26
Payer: MEDICARE

## 2023-04-26 ENCOUNTER — OUTPATIENT (OUTPATIENT)
Dept: OUTPATIENT SERVICES | Facility: HOSPITAL | Age: 88
LOS: 1 days | End: 2023-04-26
Payer: MEDICARE

## 2023-04-26 DIAGNOSIS — K52.9 NONINFECTIVE GASTROENTERITIS AND COLITIS, UNSPECIFIED: ICD-10-CM

## 2023-04-26 DIAGNOSIS — K63.5 POLYP OF COLON: ICD-10-CM

## 2023-04-26 PROCEDURE — 88305 TISSUE EXAM BY PATHOLOGIST: CPT | Mod: 26

## 2023-04-26 PROCEDURE — 45385 COLONOSCOPY W/LESION REMOVAL: CPT

## 2023-04-26 PROCEDURE — 45380 COLONOSCOPY AND BIOPSY: CPT | Mod: XS

## 2023-04-26 PROCEDURE — 88305 TISSUE EXAM BY PATHOLOGIST: CPT

## 2023-04-26 PROCEDURE — 45380 COLONOSCOPY AND BIOPSY: CPT | Mod: 59

## 2023-04-26 NOTE — HISTORY OF PRESENT ILLNESS
[FreeTextEntry1] : The patient had a colonoscopy.  She continues to have diarrhea.  This was significant.  Troublesome for her.  There was a concern that this patient has elevated 5-HIAA level.  She has been evaluated by cardiology and has been cleared.

## 2023-04-28 LAB — SURGICAL PATHOLOGY STUDY: SIGNIFICANT CHANGE UP

## 2023-06-02 ENCOUNTER — APPOINTMENT (OUTPATIENT)
Dept: PULMONOLOGY | Facility: CLINIC | Age: 88
End: 2023-06-02
Payer: MEDICARE

## 2023-06-02 VITALS — HEIGHT: 62 IN | BODY MASS INDEX: 31.1 KG/M2 | WEIGHT: 169 LBS

## 2023-06-02 VITALS
OXYGEN SATURATION: 97 % | HEART RATE: 70 BPM | SYSTOLIC BLOOD PRESSURE: 128 MMHG | RESPIRATION RATE: 14 BRPM | DIASTOLIC BLOOD PRESSURE: 66 MMHG

## 2023-06-02 PROCEDURE — 99214 OFFICE O/P EST MOD 30 MIN: CPT

## 2023-06-02 RX ORDER — AZITHROMYCIN 250 MG/1
250 TABLET, FILM COATED ORAL
Qty: 1 | Refills: 0 | Status: DISCONTINUED | COMMUNITY
Start: 2023-04-04 | End: 2023-06-02

## 2023-06-02 RX ORDER — PREDNISONE 10 MG/1
10 TABLET ORAL
Qty: 50 | Refills: 0 | Status: DISCONTINUED | COMMUNITY
Start: 2023-04-04 | End: 2023-06-02

## 2023-06-02 RX ORDER — ALBUTEROL SULFATE 90 UG/1
108 (90 BASE) INHALANT RESPIRATORY (INHALATION)
Qty: 3 | Refills: 1 | Status: ACTIVE | COMMUNITY
Start: 2017-07-26 | End: 1900-01-01

## 2023-06-02 NOTE — PROCEDURE
[FreeTextEntry1] : PFTs 7/25/18 - essentially normal and were near baseline.\par PFTs 9/12/19 - essentially normal with a mildly reduced diffusion capacity which corrected for alveolar volume; overall her lung function was without change\par Spirometry 10/21/19 - essentially normal\par PFTs 12/8/20 - essentially normal\par PFTs 1/6/22 - essentially normal \par PFTs 6/2/23 - Mildly reduced FVC and normal FEV1 without obstruction and near normal lung volumes with mildly reduced DLCO which corrected for alveolar volume; overall, slightly reduced from prior

## 2023-06-02 NOTE — DISCUSSION/SUMMARY
[FreeTextEntry1] : \par #1. Continue Advair 250/50 for mild asthma; attempts at lowering the dose resulted in increased symptoms so will continue current dose\par #2. Montelukast nightly\par #3. Ventolin as needed\par #4. Diet and exercise for weight loss\par #5. SOBOE is likely related to weight and deconditioning\par #6. Z-nish and prednisone taper for URI/wheeze/cough as needed; CXR was clear\par #7. F/u 4 months\par #8. Pt had both Covid vaccines, Covid infection, booster and flu vaccines; recovered from prior Covid infection from 10/2021\par #9. 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 \par #10. Reviewed risks of exposure and symptoms of Covid-19 virus, including how the virus is spread and precautions to avoid jesús virus.\par \par The patient expressed understanding and agreement with the above recommendations/plan and accepts responsibility to be compliant with recommended testing, therapies, and f/u visits.\par All relevant questions and concerns were addressed.

## 2023-06-02 NOTE — CONSULT LETTER
[Dear  ___] : Dear  [unfilled], [Consult Letter:] : I had the pleasure of evaluating your patient, [unfilled]. [Please see my note below.] : Please see my note below. [Consult Closing:] : Thank you very much for allowing me to participate in the care of this patient.  If you have any questions, please do not hesitate to contact me. [Sincerely,] : Sincerely, [DrValeria  ___] : Dr. RODRÍGUEZ [DrValeria ___] : Dr. RODRÍGUEZ [FreeTextEntry3] : Nikos Andrea MD, FCCP, D. ABSM\par Pulmonary and Sleep Medicine\par [facility] [place]\par

## 2023-06-13 ENCOUNTER — NON-APPOINTMENT (OUTPATIENT)
Age: 88
End: 2023-06-13

## 2023-06-13 ENCOUNTER — APPOINTMENT (OUTPATIENT)
Dept: CARDIOLOGY | Facility: CLINIC | Age: 88
End: 2023-06-13
Payer: MEDICARE

## 2023-06-13 VITALS
HEIGHT: 62 IN | SYSTOLIC BLOOD PRESSURE: 137 MMHG | WEIGHT: 172 LBS | DIASTOLIC BLOOD PRESSURE: 78 MMHG | TEMPERATURE: 98.1 F | BODY MASS INDEX: 31.65 KG/M2 | HEART RATE: 79 BPM | OXYGEN SATURATION: 95 %

## 2023-06-13 PROCEDURE — 99215 OFFICE O/P EST HI 40 MIN: CPT

## 2023-06-13 PROCEDURE — 93000 ELECTROCARDIOGRAM COMPLETE: CPT

## 2023-06-20 ENCOUNTER — OFFICE (OUTPATIENT)
Dept: URBAN - METROPOLITAN AREA CLINIC 115 | Facility: CLINIC | Age: 88
Setting detail: OPHTHALMOLOGY
End: 2023-06-20
Payer: MEDICARE

## 2023-06-20 ENCOUNTER — NON-APPOINTMENT (OUTPATIENT)
Age: 88
End: 2023-06-20

## 2023-06-20 DIAGNOSIS — H43.813: ICD-10-CM

## 2023-06-20 DIAGNOSIS — H35.431: ICD-10-CM

## 2023-06-20 DIAGNOSIS — Z96.1: ICD-10-CM

## 2023-06-20 DIAGNOSIS — H35.033: ICD-10-CM

## 2023-06-20 DIAGNOSIS — H35.3121: ICD-10-CM

## 2023-06-20 DIAGNOSIS — H40.013: ICD-10-CM

## 2023-06-20 DIAGNOSIS — H01.004: ICD-10-CM

## 2023-06-20 DIAGNOSIS — H01.001: ICD-10-CM

## 2023-06-20 PROCEDURE — 92014 COMPRE OPH EXAM EST PT 1/>: CPT | Performed by: OPHTHALMOLOGY

## 2023-06-20 PROCEDURE — 92133 CPTRZD OPH DX IMG PST SGM ON: CPT | Performed by: OPHTHALMOLOGY

## 2023-06-20 ASSESSMENT — REFRACTION_MANIFEST
OS_VA1: 20/20
OD_AXIS: 105
OS_CYLINDER: -0.50
OS_ADD: +2.50
OD_VA1: 20/25
OS_AXIS: 075
OD_SPHERE: -1.00
OD_ADD: +2.50
OS_SPHERE: -1.25
OD_CYLINDER: -1.00

## 2023-06-20 ASSESSMENT — REFRACTION_CURRENTRX
OD_OVR_VA: 20/
OD_CYLINDER: -1.00
OD_VPRISM_DIRECTION: BF
OS_VPRISM_DIRECTION: BF
OD_AXIS: 105
OS_SPHERE: -1.25
OS_OVR_VA: 20/
OS_CYLINDER: -0.50
OS_ADD: +2.75
OS_AXIS: 086
OD_ADD: +2.75
OD_SPHERE: -1.00

## 2023-06-20 ASSESSMENT — REFRACTION_AUTOREFRACTION
OS_AXIS: 100
OS_CYLINDER: -0.50
OD_AXIS: 130
OD_SPHERE: -0.75
OD_CYLINDER: -1.75
OS_SPHERE: -1.50

## 2023-06-20 ASSESSMENT — LID EXAM ASSESSMENTS
OD_BLEPHARITIS: RUL T
OS_BLEPHARITIS: LUL T

## 2023-06-20 ASSESSMENT — SPHEQUIV_DERIVED
OD_SPHEQUIV: -1.5
OD_SPHEQUIV: -1.625
OS_SPHEQUIV: -1.75
OS_SPHEQUIV: -1.5

## 2023-06-20 ASSESSMENT — PACHYMETRY
OD_CT_UM: 599
OS_CT_UM: 590
OS_CT_CORRECTION: -4
OD_CT_CORRECTION: -4

## 2023-06-20 ASSESSMENT — CONFRONTATIONAL VISUAL FIELD TEST (CVF)
OS_FINDINGS: FULL
OD_FINDINGS: FULL

## 2023-06-20 ASSESSMENT — VISUAL ACUITY
OD_BCVA: 20/25
OS_BCVA: 20/25

## 2023-06-20 ASSESSMENT — TONOMETRY: OS_IOP_MMHG: 19

## 2023-06-20 ASSESSMENT — KERATOMETRY: METHOD_AUTO_MANUAL: MANUAL

## 2023-06-22 ENCOUNTER — APPOINTMENT (OUTPATIENT)
Dept: INTERNAL MEDICINE | Facility: CLINIC | Age: 88
End: 2023-06-22
Payer: MEDICARE

## 2023-06-22 VITALS
HEIGHT: 62 IN | SYSTOLIC BLOOD PRESSURE: 125 MMHG | DIASTOLIC BLOOD PRESSURE: 72 MMHG | RESPIRATION RATE: 12 BRPM | OXYGEN SATURATION: 97 % | BODY MASS INDEX: 30.18 KG/M2 | HEART RATE: 74 BPM | WEIGHT: 164 LBS

## 2023-06-22 PROCEDURE — 99214 OFFICE O/P EST MOD 30 MIN: CPT | Mod: 25

## 2023-06-22 PROCEDURE — 36415 COLL VENOUS BLD VENIPUNCTURE: CPT

## 2023-06-23 ENCOUNTER — NON-APPOINTMENT (OUTPATIENT)
Age: 88
End: 2023-06-23

## 2023-06-23 LAB
ALBUMIN SERPL ELPH-MCNC: 4.4 G/DL
ALP BLD-CCNC: 82 U/L
ALT SERPL-CCNC: 15 U/L
ANION GAP SERPL CALC-SCNC: 12 MMOL/L
AST SERPL-CCNC: 15 U/L
BILIRUB SERPL-MCNC: 0.3 MG/DL
BUN SERPL-MCNC: 19 MG/DL
CALCIUM SERPL-MCNC: 9.6 MG/DL
CHLORIDE SERPL-SCNC: 101 MMOL/L
CHOLEST SERPL-MCNC: 194 MG/DL
CO2 SERPL-SCNC: 24 MMOL/L
CREAT SERPL-MCNC: 0.62 MG/DL
EGFR: 86 ML/MIN/1.73M2
ESTIMATED AVERAGE GLUCOSE: 117 MG/DL
GLUCOSE SERPL-MCNC: 97 MG/DL
HBA1C MFR BLD HPLC: 5.7 %
HDLC SERPL-MCNC: 62 MG/DL
LDLC SERPL CALC-MCNC: 115 MG/DL
NONHDLC SERPL-MCNC: 133 MG/DL
POTASSIUM SERPL-SCNC: 4.8 MMOL/L
PROT SERPL-MCNC: 7 G/DL
SODIUM SERPL-SCNC: 137 MMOL/L
TRIGL SERPL-MCNC: 92 MG/DL
TSH SERPL-ACNC: 3.45 UIU/ML
URATE SERPL-MCNC: 4.7 MG/DL

## 2023-06-23 NOTE — HISTORY OF PRESENT ILLNESS
[FreeTextEntry1] : Pt presents for followup on hypertension/hyperlipidemia/coronary artery disease. Patient is currently fasting for today's labs. Patient is currently on lisinopril/isradipine/bystolic/hydralazine for her hypertension, managing her hyperlipidemia dietarily and on ASA for coronary artery disease.\par -following with N/S for brain lesion/meningioma\par -got covid vaccines X5\par -Seeing chiropractor for back, may want referral for PT in the future\par \par

## 2023-06-23 NOTE — ASSESSMENT
[FreeTextEntry1] : Venipuncture done in our office, Labs sent out and further recommendations will be made based on lab results. Patient advised to continue present medications with diet/exercise and specialist followup. Patient will return to the office in nov for CP\par \par \par Shingrix d/w pt, +got covid vaccines X5\par Mammogram-3/2023\par bone density =6/2022\par Colonoscopy was 4/2023= no need for follow up\par Sigmoidoscopy 11/2018\par  specialists which include..\par 1. cardiology-Dr. Strong\par 2. pulmonary-Dr. Andrea\par 3. dermatology-Dr. Del Valle\par 4. gynecology-Dr. Vaca\par 5. ophthalmology-Site MD\par 6..podiatry prn-Dr. Thomas\par 7. Orthopedic-Dr. Moreno\par 8.GI-Dr. Cobb\par 9.Chiro+\par 10.N/S-Dr. Shah/Dr. Viveros\par hepatitis C screening=4/2018\par cath 9/2019=nonobstructive CAD\par stress test 11/2022\par brain MRI via N/S

## 2023-06-27 ENCOUNTER — RX RENEWAL (OUTPATIENT)
Age: 88
End: 2023-06-27

## 2023-08-10 ENCOUNTER — RX RENEWAL (OUTPATIENT)
Age: 88
End: 2023-08-10

## 2023-09-03 ENCOUNTER — RX RENEWAL (OUTPATIENT)
Age: 88
End: 2023-09-03

## 2023-09-06 ENCOUNTER — APPOINTMENT (OUTPATIENT)
Dept: GASTROENTEROLOGY | Facility: CLINIC | Age: 88
End: 2023-09-06
Payer: MEDICARE

## 2023-09-06 VITALS
HEART RATE: 70 BPM | SYSTOLIC BLOOD PRESSURE: 120 MMHG | DIASTOLIC BLOOD PRESSURE: 75 MMHG | HEIGHT: 62 IN | RESPIRATION RATE: 16 BRPM | BODY MASS INDEX: 31.28 KG/M2 | WEIGHT: 170 LBS | OXYGEN SATURATION: 98 %

## 2023-09-06 DIAGNOSIS — K57.30 DIVERTICULOSIS OF LARGE INTESTINE W/OUT PERFORATION OR ABSCESS W/OUT BLEEDING: ICD-10-CM

## 2023-09-06 DIAGNOSIS — K52.9 NONINFECTIVE GASTROENTERITIS AND COLITIS, UNSPECIFIED: ICD-10-CM

## 2023-09-06 PROCEDURE — 99213 OFFICE O/P EST LOW 20 MIN: CPT

## 2023-09-06 NOTE — ASSESSMENT
[FreeTextEntry1] : Lower GI symptoms including urgency, diarrhea.  No evidence of any colitis on the colonoscopy.  Severe diverticulosis of the left side.  Continue the probiotic.  Recommended to take IBgard.  She is paying special attention on her diet.  She is not eating any spicy food.  She is on low fiber diet.  Follow-up with us in 6 months.

## 2023-09-06 NOTE — PHYSICAL EXAM

## 2023-09-06 NOTE — HISTORY OF PRESENT ILLNESS
[FreeTextEntry1] : The patient had a colonoscopy.  She continues to have diarrhea.  This was significant.  Troublesome for her.  There was a concern that this patient has elevated 5-HIAA level.  She has been evaluated by cardiology and has been cleared.  The patient has looked into her diet.  She has eliminated the broccoli, [place] sprouts and other gas-forming vegetables and she has seen a significant difference in her abdominal discomfort.  She still has 2 positive bowel movement in the morning with some urgency but there is no bleeding.  There is no other complaints.  She is not on any other medications.  She is taking probiotics twice daily.

## 2023-10-04 ENCOUNTER — APPOINTMENT (OUTPATIENT)
Dept: PULMONOLOGY | Facility: CLINIC | Age: 88
End: 2023-10-04
Payer: MEDICARE

## 2023-10-04 VITALS
WEIGHT: 170 LBS | DIASTOLIC BLOOD PRESSURE: 60 MMHG | RESPIRATION RATE: 16 BRPM | OXYGEN SATURATION: 97 % | SYSTOLIC BLOOD PRESSURE: 142 MMHG | HEIGHT: 62 IN | HEART RATE: 62 BPM | BODY MASS INDEX: 31.28 KG/M2

## 2023-10-04 PROCEDURE — 99214 OFFICE O/P EST MOD 30 MIN: CPT

## 2023-11-29 ENCOUNTER — APPOINTMENT (OUTPATIENT)
Dept: PULMONOLOGY | Facility: CLINIC | Age: 88
End: 2023-11-29
Payer: MEDICARE

## 2023-11-29 VITALS
RESPIRATION RATE: 16 BRPM | HEART RATE: 63 BPM | OXYGEN SATURATION: 98 % | SYSTOLIC BLOOD PRESSURE: 134 MMHG | BODY MASS INDEX: 31.28 KG/M2 | DIASTOLIC BLOOD PRESSURE: 66 MMHG | WEIGHT: 170 LBS | HEIGHT: 62 IN

## 2023-11-29 DIAGNOSIS — G47.00 INSOMNIA, UNSPECIFIED: ICD-10-CM

## 2023-11-29 PROCEDURE — 99214 OFFICE O/P EST MOD 30 MIN: CPT

## 2023-11-30 LAB
RAPID RVP RESULT: NOT DETECTED
SARS-COV-2 RNA PNL RESP NAA+PROBE: NOT DETECTED

## 2023-12-12 ENCOUNTER — APPOINTMENT (OUTPATIENT)
Dept: INTERNAL MEDICINE | Facility: CLINIC | Age: 88
End: 2023-12-12
Payer: MEDICARE

## 2023-12-12 VITALS
RESPIRATION RATE: 14 BRPM | DIASTOLIC BLOOD PRESSURE: 82 MMHG | HEART RATE: 63 BPM | HEIGHT: 62 IN | WEIGHT: 168 LBS | BODY MASS INDEX: 30.91 KG/M2 | OXYGEN SATURATION: 96 % | SYSTOLIC BLOOD PRESSURE: 140 MMHG

## 2023-12-12 DIAGNOSIS — I25.10 ATHEROSCLEROTIC HEART DISEASE OF NATIVE CORONARY ARTERY W/OUT ANGINA PECTORIS: ICD-10-CM

## 2023-12-12 DIAGNOSIS — M85.80 OTHER SPECIFIED DISORDERS OF BONE DENSITY AND STRUCTURE, UNSPECIFIED SITE: ICD-10-CM

## 2023-12-12 DIAGNOSIS — K58.9 IRRITABLE BOWEL SYNDROME W/OUT DIARRHEA: ICD-10-CM

## 2023-12-12 PROCEDURE — G0439: CPT

## 2023-12-12 PROCEDURE — 36415 COLL VENOUS BLD VENIPUNCTURE: CPT

## 2023-12-13 LAB
25(OH)D3 SERPL-MCNC: 57.7 NG/ML
ALBUMIN SERPL ELPH-MCNC: 4.5 G/DL
ALP BLD-CCNC: 77 U/L
ALT SERPL-CCNC: 22 U/L
ANION GAP SERPL CALC-SCNC: 14 MMOL/L
APPEARANCE: CLEAR
AST SERPL-CCNC: 15 U/L
BACTERIA: NEGATIVE /HPF
BASOPHILS # BLD AUTO: 0.08 K/UL
BASOPHILS NFR BLD AUTO: 0.9 %
BILIRUB SERPL-MCNC: 0.3 MG/DL
BILIRUBIN URINE: NEGATIVE
BLOOD URINE: NEGATIVE
BUN SERPL-MCNC: 22 MG/DL
CALCIUM SERPL-MCNC: 9.5 MG/DL
CAST: 0 /LPF
CHLORIDE SERPL-SCNC: 98 MMOL/L
CHOLEST SERPL-MCNC: 217 MG/DL
CO2 SERPL-SCNC: 23 MMOL/L
COLOR: YELLOW
CREAT SERPL-MCNC: 0.66 MG/DL
EGFR: 84 ML/MIN/1.73M2
EOSINOPHIL # BLD AUTO: 0.19 K/UL
EOSINOPHIL NFR BLD AUTO: 2 %
EPITHELIAL CELLS: 1 /HPF
ESTIMATED AVERAGE GLUCOSE: 120 MG/DL
GLUCOSE QUALITATIVE U: NEGATIVE MG/DL
GLUCOSE SERPL-MCNC: 96 MG/DL
HBA1C MFR BLD HPLC: 5.8 %
HCT VFR BLD CALC: 41.3 %
HDLC SERPL-MCNC: 71 MG/DL
HGB BLD-MCNC: 13.7 G/DL
IMM GRANULOCYTES NFR BLD AUTO: 1.6 %
KETONES URINE: NEGATIVE MG/DL
LDLC SERPL CALC-MCNC: 127 MG/DL
LEUKOCYTE ESTERASE URINE: ABNORMAL
LYMPHOCYTES # BLD AUTO: 2.16 K/UL
LYMPHOCYTES NFR BLD AUTO: 23.2 %
MAN DIFF?: NORMAL
MCHC RBC-ENTMCNC: 29 PG
MCHC RBC-ENTMCNC: 33.2 GM/DL
MCV RBC AUTO: 87.5 FL
MICROSCOPIC-UA: NORMAL
MONOCYTES # BLD AUTO: 0.68 K/UL
MONOCYTES NFR BLD AUTO: 7.3 %
NEUTROPHILS # BLD AUTO: 6.06 K/UL
NEUTROPHILS NFR BLD AUTO: 65 %
NITRITE URINE: NEGATIVE
NONHDLC SERPL-MCNC: 146 MG/DL
PH URINE: 5.5
PLATELET # BLD AUTO: 311 K/UL
POTASSIUM SERPL-SCNC: 4.8 MMOL/L
PROT SERPL-MCNC: 7 G/DL
PROTEIN URINE: NEGATIVE MG/DL
RBC # BLD: 4.72 M/UL
RBC # FLD: 14.6 %
RED BLOOD CELLS URINE: 1 /HPF
SODIUM SERPL-SCNC: 135 MMOL/L
SPECIFIC GRAVITY URINE: 1.02
TRIGL SERPL-MCNC: 107 MG/DL
UROBILINOGEN URINE: 0.2 MG/DL
WBC # FLD AUTO: 9.32 K/UL
WHITE BLOOD CELLS URINE: 2 /HPF

## 2023-12-16 ENCOUNTER — APPOINTMENT (OUTPATIENT)
Dept: MRI IMAGING | Facility: CLINIC | Age: 88
End: 2023-12-16
Payer: MEDICARE

## 2023-12-16 ENCOUNTER — OUTPATIENT (OUTPATIENT)
Dept: OUTPATIENT SERVICES | Facility: HOSPITAL | Age: 88
LOS: 1 days | End: 2023-12-16

## 2023-12-16 DIAGNOSIS — D32.9 BENIGN NEOPLASM OF MENINGES, UNSPECIFIED: ICD-10-CM

## 2023-12-16 PROCEDURE — 70553 MRI BRAIN STEM W/O & W/DYE: CPT | Mod: 26

## 2023-12-18 ENCOUNTER — NON-APPOINTMENT (OUTPATIENT)
Age: 88
End: 2023-12-18

## 2023-12-28 ENCOUNTER — APPOINTMENT (OUTPATIENT)
Dept: NEUROSURGERY | Facility: CLINIC | Age: 88
End: 2023-12-28
Payer: MEDICARE

## 2023-12-28 VITALS
TEMPERATURE: 98.2 F | DIASTOLIC BLOOD PRESSURE: 85 MMHG | SYSTOLIC BLOOD PRESSURE: 153 MMHG | HEIGHT: 62 IN | WEIGHT: 165 LBS | OXYGEN SATURATION: 94 % | HEART RATE: 74 BPM | BODY MASS INDEX: 30.36 KG/M2

## 2023-12-28 DIAGNOSIS — D32.9 BENIGN NEOPLASM OF MENINGES, UNSPECIFIED: ICD-10-CM

## 2023-12-28 PROCEDURE — 99214 OFFICE O/P EST MOD 30 MIN: CPT

## 2023-12-28 NOTE — DATA REVIEWED
[de-identified] : EXAM: MR BRAIN WAW IC\par  \par  \par  PROCEDURE DATE: 09/09/2021\par  \par  \par  \par  INTERPRETATION: INDICATIONS: Brain lesion of right asrah and falx. Follow-up, continued headache and dizziness with vertigo\par  \par  TECHNIQUE: Multiplanar imaging was performed using T1 weighted, T2 weighted and FLAIR sequences. Diffusion weighted and SWI images were also obtained. Following intravenous gadolinium, multiplanar T1 weighted images were performed. 7.5 cc Gadavist were administered. 0 cc were discarded.\par  \par  COMPARISON EXAMINATION: 4/8/2021\par  \par  FINDINGS:\par  VENTRICLES AND SULCI: Age-appropriate involutional changes as seen on the prior study..\par  INTRA-AXIAL: At the level of the sarah evidence of a lesion that has low signal T2 with lower signal T1 without surrounding edema or mass effect may represent a focus of prior hemorrhage versus a cavernous malformation. No associated enhancement. Second focus in the right lateral temporal parietal region also may represent a second cavernous malformation versus a prior hemorrhage possibly on the basis of amyloid as a consideration or hypertension. 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

## 2023-12-28 NOTE — ASSESSMENT
[FreeTextEntry1] : Ms. Rosa presents with above history and imaging. She is neurologically intact. I have personally reviewed her MRI head w/wo contrast when compared to 4/2022 does not reveal any significant change. I believe it is unlikely the cause of her vertigo.  She will obtain an MRI brain w/wo contrast to further characterize and surveil the 7mm brain lesion and incidental falx meningioma in 1 year. 12/2024  Continue BP medications as ordered to maintained BP <140/90. Continue statin therapy with an LDL goal of < 70 for secondary stroke prevention. f/u after imaging. Patient has been given an opportunity to ask and have their questions answered to their satisfaction. Patient knows to call the office if there are any new or worsening symptoms.   I, Dr. Baljeet Viveros, personally performed the evaluation and management (E/M) services for this established patient who presents today. That E/M includes conducting the clinically appropriate interval history &/or exam and establishing a continued plan of care. Today, my ROWDY, Jessica Gutierrez, was here to observe my evaluation and management service for this patient and follow the plan of care established by me going forward.

## 2024-01-03 ENCOUNTER — APPOINTMENT (OUTPATIENT)
Dept: PULMONOLOGY | Facility: CLINIC | Age: 89
End: 2024-01-03
Payer: MEDICARE

## 2024-01-03 VITALS
HEART RATE: 65 BPM | SYSTOLIC BLOOD PRESSURE: 134 MMHG | DIASTOLIC BLOOD PRESSURE: 84 MMHG | RESPIRATION RATE: 16 BRPM | OXYGEN SATURATION: 98 %

## 2024-01-03 VITALS — WEIGHT: 169 LBS | BODY MASS INDEX: 30.32 KG/M2 | HEIGHT: 62.5 IN

## 2024-01-03 DIAGNOSIS — G47.33 OBSTRUCTIVE SLEEP APNEA (ADULT) (PEDIATRIC): ICD-10-CM

## 2024-01-03 DIAGNOSIS — R05.9 COUGH, UNSPECIFIED: ICD-10-CM

## 2024-01-03 DIAGNOSIS — Z86.16 PERSONAL HISTORY OF COVID-19: ICD-10-CM

## 2024-01-03 DIAGNOSIS — J45.909 UNSPECIFIED ASTHMA, UNCOMPLICATED: ICD-10-CM

## 2024-01-03 PROCEDURE — 94010 BREATHING CAPACITY TEST: CPT

## 2024-01-03 PROCEDURE — 99214 OFFICE O/P EST MOD 30 MIN: CPT | Mod: 25

## 2024-01-03 RX ORDER — FLUTICASONE PROPIONATE AND SALMETEROL 250; 50 UG/1; UG/1
250-50 POWDER RESPIRATORY (INHALATION)
Qty: 3 | Refills: 3 | Status: ACTIVE | COMMUNITY
Start: 2022-08-26 | End: 1900-01-01

## 2024-01-03 NOTE — PROCEDURE
[FreeTextEntry1] : PFTs 7/25/18 - essentially normal and were near baseline. PFTs 9/12/19 - essentially normal with a mildly reduced diffusion capacity which corrected for alveolar volume; overall her lung function was without change. Spirometry 10/21/19 - essentially normal. PFTs 12/8/20 - essentially normal. PFTs 1/6/22 - essentially normal. PFTs 6/2/23 - Mildly reduced FVC and normal FEV1 without obstruction and near normal lung volumes with mildly reduced DLCO which corrected for alveolar volume; overall, slightly reduced from prior. [place] 1/3/23 - normal and improved.

## 2024-01-03 NOTE — CONSULT LETTER
[Dear  ___] : Dear  [unfilled], [Consult Letter:] : I had the pleasure of evaluating your patient, [unfilled]. [Please see my note below.] : Please see my note below. [Consult Closing:] : Thank you very much for allowing me to participate in the care of this patient.  If you have any questions, please do not hesitate to contact me. [Sincerely,] : Sincerely, [DrValeria  ___] : Dr. RODRÍGUEZ [DrValeria ___] : Dr. RODRÍGUEZ [FreeTextEntry3] : Nikos Andrea MD, FCCP, D. ABSM\par  Pulmonary and Sleep Medicine\par  [facility] [place]\par

## 2024-01-03 NOTE — HISTORY OF PRESENT ILLNESS
[Wheezing] : wheezing [Dyspnea on Exertion] : dyspnea on exertion [Non-Productive Cough] : non-productive cough [Inhaled Short-Acting Beta-2 Agonists] : inhaled short-acting beta-2 agonists [Inhaled Long-Acting Beta-2 Agonists] : inhaled long-acting beta-2 agonists [Inhaled Corticosteroids] : inhaled corticosteroids [Good Compliance] : good compliance with treatment [Good Tolerance] : good tolerance of treatment [Good Symptom Control] : good symptom control [Excess Weight] : excess weight [Currently Experiencing] : The patient is currently experiencing symptoms. [Dyspnea] : dyspnea [Low Calorie Diet] : low calorie diet [Fair Compliance] : fair compliance with treatment [Fair Tolerance] : fair tolerance of treatment [Poor Symptom Control] : poor symptom control [Hypertension] : hypertension [High] : high [Low Calorie] : low calorie [Well Balanced Diet] : well balanced meals [Follow-Up - Routine Clinic] : a routine clinic follow-up of [Excessive Daytime Sleepiness] : excessive daytime sleepiness [Unrefreshing Sleep] : unrefreshing sleep [Sleepy When Sedentary] : sleepy when sedentary [None] : The patient is not currently being treated for this problem [TextBox_4] : The patient presents for evaluation for her upcoming catheterization. She c/o increased SOBOE despite no change in her lung function and had cardiac w/u which was not diagnostic. Pt was + for Covid on 10/9/21 and had the Monoclonal Ab infusion when she felt URI symptoms, fevers, H/a, body aches, cough but now with resolution of symptoms. Pt had HST for poor sleep and difficult to control HTN, especially in AM. Pt with mild JOSE but does not want to proceed with therapy. The patient understands the risks of untreated JOSE including heart disease, strokes, hypertension, pulmonary hypertension, and motor vehicle accidents as well as the need for treatment and weight loss.  Pt reports one week h/o nasal congestion and cough since Christmas. She completed abx and steroids with little improvement.

## 2024-01-03 NOTE — RESULTS/DATA
[TextEntry] : Pre-op CXR from 7/21/17 was clear. CXR from 10/21/21 was clear. Home PSG from 12/15/21 revealed mild JOSE with an AHI of 7.2. CXR from 4/4/23 was clear - reviewed results with pt previously.

## 2024-01-03 NOTE — PHYSICAL EXAM
[No Acute Distress] : no acute distress [Well Nourished] : well nourished [Well Developed] : well developed [Normal Appearance] : normal appearance [Supple] : supple [Normal Rate/Rhythm] : normal rate/rhythm [Normal S1, S2] : normal s1, s2 [No Murmurs] : no murmurs [No Resp Distress] : no resp distress [No Acc Muscle Use] : no acc muscle use [Normal Rhythm and Effort] : normal rhythm and effort [Clear to Auscultation Bilaterally] : clear to auscultation bilaterally [No Abnormalities] : no abnormalities [Benign] : benign [Not Tender] : not tender [Soft] : soft [No Clubbing] : no clubbing [1+ Pitting] : 1+ pitting [No Focal Deficits] : no focal deficits [Oriented x3] : oriented x3 [TextBox_2] : Pt is obese [TextBox_105] : trace edema

## 2024-01-04 ENCOUNTER — OUTPATIENT (OUTPATIENT)
Dept: OUTPATIENT SERVICES | Facility: HOSPITAL | Age: 89
LOS: 1 days | End: 2024-01-04
Payer: MEDICARE

## 2024-01-04 ENCOUNTER — APPOINTMENT (OUTPATIENT)
Dept: RADIOLOGY | Facility: CLINIC | Age: 89
End: 2024-01-04
Payer: MEDICARE

## 2024-01-04 DIAGNOSIS — R05.9 COUGH, UNSPECIFIED: ICD-10-CM

## 2024-01-04 PROCEDURE — 71046 X-RAY EXAM CHEST 2 VIEWS: CPT | Mod: 26

## 2024-01-04 PROCEDURE — 71046 X-RAY EXAM CHEST 2 VIEWS: CPT

## 2024-01-16 ENCOUNTER — APPOINTMENT (OUTPATIENT)
Dept: PULMONOLOGY | Facility: CLINIC | Age: 89
End: 2024-01-16

## 2024-02-26 ENCOUNTER — RX RENEWAL (OUTPATIENT)
Age: 89
End: 2024-02-26

## 2024-02-26 RX ORDER — HYDRALAZINE HYDROCHLORIDE 25 MG/1
25 TABLET ORAL TWICE DAILY
Qty: 180 | Refills: 2 | Status: ACTIVE | COMMUNITY
Start: 1900-01-01 | End: 1900-01-01

## 2024-02-29 ENCOUNTER — NON-APPOINTMENT (OUTPATIENT)
Age: 89
End: 2024-02-29

## 2024-03-04 LAB
BACTERIA STL CULT: NORMAL
GI PCR PANEL: NOT DETECTED

## 2024-03-07 LAB
C DIFF TOXIN B QL PCR REFLEX: NORMAL
GDH ANTIGEN: NOT DETECTED
TOXIN A AND B: NOT DETECTED

## 2024-03-27 ENCOUNTER — APPOINTMENT (OUTPATIENT)
Dept: INTERNAL MEDICINE | Facility: CLINIC | Age: 89
End: 2024-03-27
Payer: MEDICARE

## 2024-03-27 VITALS
RESPIRATION RATE: 13 BRPM | HEART RATE: 68 BPM | DIASTOLIC BLOOD PRESSURE: 80 MMHG | BODY MASS INDEX: 29.43 KG/M2 | SYSTOLIC BLOOD PRESSURE: 125 MMHG | WEIGHT: 164 LBS | HEIGHT: 62.5 IN | OXYGEN SATURATION: 94 %

## 2024-03-27 DIAGNOSIS — E66.9 OBESITY, UNSPECIFIED: ICD-10-CM

## 2024-03-27 DIAGNOSIS — Z79.899 OTHER LONG TERM (CURRENT) DRUG THERAPY: ICD-10-CM

## 2024-03-27 DIAGNOSIS — L98.9 DISORDER OF THE SKIN AND SUBCUTANEOUS TISSUE, UNSPECIFIED: ICD-10-CM

## 2024-03-27 DIAGNOSIS — R73.03 PREDIABETES.: ICD-10-CM

## 2024-03-27 PROCEDURE — G2211 COMPLEX E/M VISIT ADD ON: CPT

## 2024-03-27 PROCEDURE — 36415 COLL VENOUS BLD VENIPUNCTURE: CPT

## 2024-03-27 PROCEDURE — 99214 OFFICE O/P EST MOD 30 MIN: CPT

## 2024-03-27 RX ORDER — AZITHROMYCIN 250 MG/1
250 TABLET, FILM COATED ORAL
Qty: 1 | Refills: 0 | Status: DISCONTINUED | COMMUNITY
Start: 2023-11-29 | End: 2024-03-27

## 2024-03-27 RX ORDER — PREDNISONE 10 MG/1
10 TABLET ORAL
Qty: 50 | Refills: 0 | Status: DISCONTINUED | COMMUNITY
Start: 2023-11-29 | End: 2024-03-27

## 2024-03-27 NOTE — ASSESSMENT
[FreeTextEntry1] : Dermatology evaluation .Venipuncture done in our office, Labs sent out and further recommendations will be made based on lab results. Patient advised to continue present medications with diet/exercise and specialist followup. Patient will return to the office in 3-4months  Dr. Calero was present in office building while I examined patient   vaccines are UTD/+got covid vaccines  Mammogram-3/2023=rx given bone density =6/2022 Colonoscopy was 4/2023= no need for follow up Sigmoidoscopy 11/2018  specialists which include.. 1. cardiology-Dr. Strong 2. pulmonary-Dr. Andrea 3. dermatology-Dr. Del Valle 4. gynecology-Dr. Vaca 5. ophthalmology-Site MD 6..podiatry prn-Dr. Thomas 7. Orthopedic-Dr. Moreno 8.GI-Dr. Cobb 9.Chiro+ 10.Rheum=Dr. Borges 10.N/S-Dr. Shah/Dr. Viveros hepatitis C screening=4/2018 stress test 11/2022 cath 9/2019=nonobstructive CAD brain MRI via N/S=12/2023

## 2024-03-27 NOTE — PHYSICAL EXAM
[General Appearance - In No Acute Distress] : in no acute distress [] : no respiratory distress [Heart Rate And Rhythm] : heart rate was normal and rhythm regular [Respiration, Rhythm And Depth] : normal respiratory rhythm and effort [Auscultation Breath Sounds / Voice Sounds] : lungs were clear to auscultation bilaterally [Affect] : the affect was normal [Mood] : the mood was normal [de-identified] : + Irregular raised lesion, approximately pea-sized top of right scalp with scab overlying it

## 2024-03-27 NOTE — HISTORY OF PRESENT ILLNESS
[FreeTextEntry1] : Pt presents for followup on hypertension/hyperlipidemia/coronary artery disease. Patient is currently fasting for today's labs. Patient is currently on lisinopril/isradipine/bystolic/hydralazine for her hypertension, managing her hyperlipidemia dietarily and on ASA for coronary artery disease. -got covid vaccines  -Still dealing with chronic arthritic pain, following with hematology and taking Tylenol daily -Complaining of scalp lesion that has been there for 1-2 years, occasionally bleeding when her hairbrush gets caught on it   [de-identified] : L ankle injury [FreeTextEntry6] : 18 y/o male with L ankle injury sustained while playing soccer today.  Twisted L ankle while running. Unable to stand/bear weight immediately after event. Still unable to bear weight currently due to pain. Localized pain to lateral aspect of ankle. Pain currently 7/10, higher when he tries to walk.

## 2024-03-28 LAB
ALBUMIN SERPL ELPH-MCNC: 4.6 G/DL
ALP BLD-CCNC: 76 U/L
ALT SERPL-CCNC: 30 U/L
ANION GAP SERPL CALC-SCNC: 13 MMOL/L
AST SERPL-CCNC: 21 U/L
BASOPHILS # BLD AUTO: 0.08 K/UL
BASOPHILS NFR BLD AUTO: 1.3 %
BILIRUB SERPL-MCNC: 0.3 MG/DL
BUN SERPL-MCNC: 16 MG/DL
CALCIUM SERPL-MCNC: 10 MG/DL
CHLORIDE SERPL-SCNC: 100 MMOL/L
CHOLEST SERPL-MCNC: 216 MG/DL
CO2 SERPL-SCNC: 22 MMOL/L
CREAT SERPL-MCNC: 0.6 MG/DL
EGFR: 86 ML/MIN/1.73M2
EOSINOPHIL # BLD AUTO: 0.16 K/UL
EOSINOPHIL NFR BLD AUTO: 2.5 %
ESTIMATED AVERAGE GLUCOSE: 108 MG/DL
GLUCOSE SERPL-MCNC: 107 MG/DL
HBA1C MFR BLD HPLC: 5.4 %
HCT VFR BLD CALC: 41.6 %
HDLC SERPL-MCNC: 63 MG/DL
HGB BLD-MCNC: 13.7 G/DL
IMM GRANULOCYTES NFR BLD AUTO: 1 %
LDLC SERPL CALC-MCNC: 131 MG/DL
LYMPHOCYTES # BLD AUTO: 1.48 K/UL
LYMPHOCYTES NFR BLD AUTO: 23.5 %
MAN DIFF?: NORMAL
MCHC RBC-ENTMCNC: 29 PG
MCHC RBC-ENTMCNC: 32.9 GM/DL
MCV RBC AUTO: 87.9 FL
MONOCYTES # BLD AUTO: 0.55 K/UL
MONOCYTES NFR BLD AUTO: 8.7 %
NEUTROPHILS # BLD AUTO: 3.97 K/UL
NEUTROPHILS NFR BLD AUTO: 63 %
NONHDLC SERPL-MCNC: 154 MG/DL
PLATELET # BLD AUTO: 352 K/UL
POTASSIUM SERPL-SCNC: 4.8 MMOL/L
PROT SERPL-MCNC: 7.1 G/DL
RBC # BLD: 4.73 M/UL
RBC # FLD: 13.8 %
SODIUM SERPL-SCNC: 136 MMOL/L
TRIGL SERPL-MCNC: 132 MG/DL
TSH SERPL-ACNC: 2.98 UIU/ML
URATE SERPL-MCNC: 4.6 MG/DL
WBC # FLD AUTO: 6.3 K/UL

## 2024-04-08 ENCOUNTER — APPOINTMENT (OUTPATIENT)
Dept: PULMONOLOGY | Facility: CLINIC | Age: 89
End: 2024-04-08

## 2024-05-06 ENCOUNTER — APPOINTMENT (OUTPATIENT)
Dept: RADIOLOGY | Facility: CLINIC | Age: 89
End: 2024-05-06

## 2024-05-06 ENCOUNTER — OUTPATIENT (OUTPATIENT)
Dept: OUTPATIENT SERVICES | Facility: HOSPITAL | Age: 89
LOS: 1 days | End: 2024-05-06
Payer: MEDICARE

## 2024-05-06 DIAGNOSIS — Z00.00 ENCOUNTER FOR GENERAL ADULT MEDICAL EXAMINATION WITHOUT ABNORMAL FINDINGS: ICD-10-CM

## 2024-05-06 PROCEDURE — 73502 X-RAY EXAM HIP UNI 2-3 VIEWS: CPT

## 2024-05-06 PROCEDURE — 73502 X-RAY EXAM HIP UNI 2-3 VIEWS: CPT | Mod: 26,LT

## 2024-05-16 ENCOUNTER — APPOINTMENT (OUTPATIENT)
Dept: CARDIOLOGY | Facility: CLINIC | Age: 89
End: 2024-05-16
Payer: MEDICARE

## 2024-05-16 ENCOUNTER — NON-APPOINTMENT (OUTPATIENT)
Age: 89
End: 2024-05-16

## 2024-05-16 VITALS
OXYGEN SATURATION: 96 % | WEIGHT: 170 LBS | DIASTOLIC BLOOD PRESSURE: 70 MMHG | SYSTOLIC BLOOD PRESSURE: 130 MMHG | HEIGHT: 62.5 IN | BODY MASS INDEX: 30.5 KG/M2 | HEART RATE: 62 BPM

## 2024-05-16 DIAGNOSIS — I34.0 NONRHEUMATIC MITRAL (VALVE) INSUFFICIENCY: ICD-10-CM

## 2024-05-16 DIAGNOSIS — E78.00 PURE HYPERCHOLESTEROLEMIA, UNSPECIFIED: ICD-10-CM

## 2024-05-16 DIAGNOSIS — R55 SYNCOPE AND COLLAPSE: ICD-10-CM

## 2024-05-16 DIAGNOSIS — M10.9 GOUT, UNSPECIFIED: ICD-10-CM

## 2024-05-16 DIAGNOSIS — R07.89 OTHER CHEST PAIN: ICD-10-CM

## 2024-05-16 DIAGNOSIS — Z00.00 ENCOUNTER FOR GENERAL ADULT MEDICAL EXAMINATION W/OUT ABNORMAL FINDINGS: ICD-10-CM

## 2024-05-16 DIAGNOSIS — I10 ESSENTIAL (PRIMARY) HYPERTENSION: ICD-10-CM

## 2024-05-16 DIAGNOSIS — R42 DIZZINESS AND GIDDINESS: ICD-10-CM

## 2024-05-16 DIAGNOSIS — R06.02 SHORTNESS OF BREATH: ICD-10-CM

## 2024-05-16 DIAGNOSIS — I25.10 ATHEROSCLEROTIC HEART DISEASE OF NATIVE CORONARY ARTERY W/OUT ANGINA PECTORIS: ICD-10-CM

## 2024-05-16 PROCEDURE — 93000 ELECTROCARDIOGRAM COMPLETE: CPT

## 2024-05-16 PROCEDURE — 99215 OFFICE O/P EST HI 40 MIN: CPT

## 2024-05-16 PROCEDURE — G2211 COMPLEX E/M VISIT ADD ON: CPT

## 2024-05-16 RX ORDER — NAPROXEN 375 MG/1
375 TABLET, DELAYED RELEASE ORAL
Qty: 30 | Refills: 0 | Status: ACTIVE | COMMUNITY
Start: 2024-05-16 | End: 1900-01-01

## 2024-05-16 RX ORDER — COLCHICINE 0.6 MG/1
0.6 TABLET ORAL
Qty: 30 | Refills: 2 | Status: ACTIVE | COMMUNITY
Start: 2024-05-16 | End: 1900-01-01

## 2024-05-16 NOTE — HISTORY OF PRESENT ILLNESS
[FreeTextEntry1] : labile blood pressure. orthostatic hypotension   HPI for today: :  5 16 2024:  she has a right ankle pain for th elast few days.  adn swelling. she does have history of gout in the past.   and she was  given colchicine. she did have gout in the past 5 years 3 times.  she did go to arthrtisi doctor and she got colchicine for 3 days.  adn she ran out of meds.  and that did help the pain.  but then it came back .  because she ran out of prescription. xray showed swelling. no fracture.  no chest pain . no dyspnea on exertion .    old note:  she still has colitis.  no chest pain . no dyspnea on exertion . no palpitionts.   no dizziness,. no syncope. as   as old note: she has a lot of pain. right hip pain.   she is going to a chiropractor.  it hurts on the right leg.  no ches tpain. no dyspnea.  no syncoope. no palptiations.    old note: This is a 86 year old woman with history of labile blood pressure and orthostatic hypotension. she does have dyspnea on exertion and when she exerts her self too much she gets shortness of breathe . no chest pain./ no syncope. no palpations  she did have a near syncope epsiodes .  she takes all the meds in the am.  and when she takes all tehmeds together she feel dizzy in the evening. she is also feeling insomnia. cannot sleep at night. she is takign ylenolol PM and melatonin. she has anxiety.  she started on sleep medicine trazodone.  when she took the meds. she felt better and  good sleep.

## 2024-05-16 NOTE — DISCUSSION/SUMMARY
[Patient] : the patient [Risks] : risks [Benefits] : benefits [Alternatives] : alternatives [With Me] : with me [___ Year(s)] : in [unfilled] year(s) [FreeTextEntry1] : This is a 86 year old woman with hsitory of labile hypertension, orthostatic hypotension, here with dizziness and insomnia   1)   right ankle wswellign abnd pain.  Colchicine 0.3 BID.  and uric cehck.  and avouid red meats and shell fish.  tyelnol for pain.  ESR adn CRP check.  Naproxen as needed for significant pain  isntructed to avoid nsads . f/u with Dr. collazo . 2) labile blood pressure : controled. ct current meds.  transthoracic echocardiogram  3) hypertension  ct current.   repeat echo   4) colitis:  No animal milk.  No glutein. avoid bread in general. No meat.  Will order and review ECG for the above mentioned diagnosis/condition/symptoms az a [EKG obtained to assist in diagnosis and management of assessed problem(s)] : EKG obtained to assist in diagnosis and management of assessed problem(s)

## 2024-05-16 NOTE — REVIEW OF SYSTEMS
[SOB] : no shortness of breath [Dyspnea on exertion] : not dyspnea during exertion [Chest Discomfort] : no chest discomfort [Lower Ext Edema] : no extremity edema [Leg Claudication] : no intermittent leg claudication [Palpitations] : no palpitations [Orthopnea] : no orthopnea [PND] : no PND [Syncope] : no syncope [Negative] : Heme/Lymph [FreeTextEntry5] : + dizziness

## 2024-05-30 ENCOUNTER — APPOINTMENT (OUTPATIENT)
Dept: CARDIOLOGY | Facility: CLINIC | Age: 89
End: 2024-05-30
Payer: MEDICARE

## 2024-05-30 PROCEDURE — 93306 TTE W/DOPPLER COMPLETE: CPT

## 2024-06-04 LAB
CRP SERPL-MCNC: 5 MG/L
ERYTHROCYTE [SEDIMENTATION RATE] IN BLOOD BY WESTERGREN METHOD: 11 MM/HR
NT-PROBNP SERPL-MCNC: 168 PG/ML
URATE SERPL-MCNC: 4.5 MG/DL
URATE UR-MCNC: 34.9 MG/DL

## 2024-06-21 ENCOUNTER — RX RENEWAL (OUTPATIENT)
Age: 89
End: 2024-06-21

## 2024-06-21 RX ORDER — PANTOPRAZOLE 20 MG/1
20 TABLET, DELAYED RELEASE ORAL
Qty: 90 | Refills: 1 | Status: ACTIVE | COMMUNITY
Start: 2022-11-03 | End: 1900-01-01

## 2024-07-02 ENCOUNTER — OFFICE (OUTPATIENT)
Dept: URBAN - METROPOLITAN AREA CLINIC 115 | Facility: CLINIC | Age: 89
Setting detail: OPHTHALMOLOGY
End: 2024-07-02
Payer: MEDICARE

## 2024-07-02 ENCOUNTER — NON-APPOINTMENT (OUTPATIENT)
Age: 89
End: 2024-07-02

## 2024-07-02 DIAGNOSIS — H35.431: ICD-10-CM

## 2024-07-02 DIAGNOSIS — H35.033: ICD-10-CM

## 2024-07-02 DIAGNOSIS — H40.013: ICD-10-CM

## 2024-07-02 DIAGNOSIS — H01.001: ICD-10-CM

## 2024-07-02 DIAGNOSIS — H01.004: ICD-10-CM

## 2024-07-02 DIAGNOSIS — H35.3121: ICD-10-CM

## 2024-07-02 DIAGNOSIS — Z96.1: ICD-10-CM

## 2024-07-02 DIAGNOSIS — H43.813: ICD-10-CM

## 2024-07-02 PROCEDURE — 92014 COMPRE OPH EXAM EST PT 1/>: CPT | Performed by: OPHTHALMOLOGY

## 2024-07-02 PROCEDURE — 92133 CPTRZD OPH DX IMG PST SGM ON: CPT | Performed by: OPHTHALMOLOGY

## 2024-07-02 ASSESSMENT — CONFRONTATIONAL VISUAL FIELD TEST (CVF)
OD_FINDINGS: FULL
OS_FINDINGS: FULL

## 2024-07-02 ASSESSMENT — LID EXAM ASSESSMENTS
OD_BLEPHARITIS: RUL T
OS_BLEPHARITIS: LUL T

## 2024-07-08 ENCOUNTER — APPOINTMENT (OUTPATIENT)
Dept: GASTROENTEROLOGY | Facility: CLINIC | Age: 89
End: 2024-07-08
Payer: MEDICARE

## 2024-07-08 VITALS
OXYGEN SATURATION: 98 % | BODY MASS INDEX: 30.86 KG/M2 | HEART RATE: 70 BPM | DIASTOLIC BLOOD PRESSURE: 80 MMHG | WEIGHT: 172 LBS | RESPIRATION RATE: 16 BRPM | HEIGHT: 62.5 IN | SYSTOLIC BLOOD PRESSURE: 130 MMHG

## 2024-07-08 DIAGNOSIS — K57.30 DIVERTICULOSIS OF LARGE INTESTINE W/OUT PERFORATION OR ABSCESS W/OUT BLEEDING: ICD-10-CM

## 2024-07-08 DIAGNOSIS — R14.2 ERUCTATION: ICD-10-CM

## 2024-07-08 DIAGNOSIS — R13.19 OTHER DYSPHAGIA: ICD-10-CM

## 2024-07-08 DIAGNOSIS — R19.7 DIARRHEA, UNSPECIFIED: ICD-10-CM

## 2024-07-08 PROCEDURE — G2211 COMPLEX E/M VISIT ADD ON: CPT

## 2024-07-08 PROCEDURE — 99214 OFFICE O/P EST MOD 30 MIN: CPT

## 2024-07-16 NOTE — ED ADULT NURSE NOTE - CHIEF COMPLAINT
Addended by: KEITH MARCELINO on: 7/15/2024 09:23 PM     Modules accepted: Orders     The patient is a 86y Female complaining of syncope.

## 2024-07-31 ENCOUNTER — APPOINTMENT (OUTPATIENT)
Dept: INTERNAL MEDICINE | Facility: CLINIC | Age: 89
End: 2024-07-31
Payer: MEDICARE

## 2024-07-31 VITALS
HEART RATE: 63 BPM | BODY MASS INDEX: 29.79 KG/M2 | DIASTOLIC BLOOD PRESSURE: 80 MMHG | OXYGEN SATURATION: 96 % | SYSTOLIC BLOOD PRESSURE: 125 MMHG | HEIGHT: 62.5 IN | RESPIRATION RATE: 13 BRPM | WEIGHT: 166 LBS

## 2024-07-31 DIAGNOSIS — I10 ESSENTIAL (PRIMARY) HYPERTENSION: ICD-10-CM

## 2024-07-31 DIAGNOSIS — I25.10 ATHEROSCLEROTIC HEART DISEASE OF NATIVE CORONARY ARTERY W/OUT ANGINA PECTORIS: ICD-10-CM

## 2024-07-31 DIAGNOSIS — R73.03 PREDIABETES.: ICD-10-CM

## 2024-07-31 DIAGNOSIS — E66.9 OBESITY, UNSPECIFIED: ICD-10-CM

## 2024-07-31 DIAGNOSIS — Z79.899 OTHER LONG TERM (CURRENT) DRUG THERAPY: ICD-10-CM

## 2024-07-31 DIAGNOSIS — E78.00 PURE HYPERCHOLESTEROLEMIA, UNSPECIFIED: ICD-10-CM

## 2024-07-31 PROCEDURE — 99214 OFFICE O/P EST MOD 30 MIN: CPT

## 2024-07-31 PROCEDURE — 36415 COLL VENOUS BLD VENIPUNCTURE: CPT

## 2024-07-31 PROCEDURE — G2211 COMPLEX E/M VISIT ADD ON: CPT

## 2024-07-31 NOTE — HISTORY OF PRESENT ILLNESS
[FreeTextEntry1] : Pt presents for followup on hypertension/hyperlipidemia/coronary artery disease. Patient is currently fasting for today's labs. Patient is currently on lisinopril/isradipine/bystolic/hydralazine for her hypertension, managing her hyperlipidemia dietarily and on ASA for coronary artery disease. -got covid vaccines

## 2024-07-31 NOTE — ASSESSMENT
[FreeTextEntry1] : Venipuncture done in our office, Labs sent out and further recommendations will be made based on lab results. Patient advised to continue present medications with diet/exercise and specialist followup. Patient will return to the office in dec for CP   TDAP d/w pt/+got covid vaccines, +got RSV vax Mammogram-5/2024 bone density = 10/2023 via rheumatology Colonoscopy was 4/2023= no need for follow up Sigmoidoscopy 11/2018  specialists which include.. 1. cardiology-Dr. Strong 2. pulmonary-Dr. Andrea 3. dermatology-Dr. Del Valle 4. gynecology-Dr. Vaca 5. ophthalmology-Site MD 6..podiatry prn-Dr. Thomas 7. Orthopedic-Dr. Moreno 8.GI-Dr. Cobb 9.Chiro+ 10.Rheum=Dr. Borges 10.N/S-Dr. Shah/Dr. Viveros hepatitis C screening=4/2018 stress test 11/2022 cath 9/2019=nonobstructive CAD brain MRI via N/S=12/2023.

## 2024-08-01 LAB
ALBUMIN SERPL ELPH-MCNC: 4.4 G/DL
ALP BLD-CCNC: 76 U/L
ALT SERPL-CCNC: 18 U/L
ANION GAP SERPL CALC-SCNC: 18 MMOL/L
AST SERPL-CCNC: 19 U/L
BASOPHILS # BLD AUTO: 0.08 K/UL
BASOPHILS NFR BLD AUTO: 1.3 %
BILIRUB SERPL-MCNC: 0.2 MG/DL
BUN SERPL-MCNC: 18 MG/DL
CALCIUM SERPL-MCNC: 9.2 MG/DL
CHLORIDE SERPL-SCNC: 100 MMOL/L
CHOLEST SERPL-MCNC: 195 MG/DL
CO2 SERPL-SCNC: 18 MMOL/L
CREAT SERPL-MCNC: 0.63 MG/DL
EGFR: 85 ML/MIN/1.73M2
EOSINOPHIL # BLD AUTO: 0.18 K/UL
EOSINOPHIL NFR BLD AUTO: 2.9 %
ESTIMATED AVERAGE GLUCOSE: 117 MG/DL
GLUCOSE SERPL-MCNC: 102 MG/DL
HBA1C MFR BLD HPLC: 5.7 %
HCT VFR BLD CALC: 41.4 %
HDLC SERPL-MCNC: 59 MG/DL
HGB BLD-MCNC: 12.9 G/DL
IMM GRANULOCYTES NFR BLD AUTO: 0.6 %
LDLC SERPL CALC-MCNC: 119 MG/DL
LYMPHOCYTES # BLD AUTO: 1.2 K/UL
LYMPHOCYTES NFR BLD AUTO: 19.1 %
MAN DIFF?: NORMAL
MCHC RBC-ENTMCNC: 28.1 PG
MCHC RBC-ENTMCNC: 31.2 GM/DL
MCV RBC AUTO: 90.2 FL
MONOCYTES # BLD AUTO: 0.51 K/UL
MONOCYTES NFR BLD AUTO: 8.1 %
NEUTROPHILS # BLD AUTO: 4.27 K/UL
NEUTROPHILS NFR BLD AUTO: 68 %
NONHDLC SERPL-MCNC: 137 MG/DL
PLATELET # BLD AUTO: 325 K/UL
POTASSIUM SERPL-SCNC: 4.9 MMOL/L
PROT SERPL-MCNC: 6.8 G/DL
RBC # BLD: 4.59 M/UL
RBC # FLD: 14.3 %
SODIUM SERPL-SCNC: 137 MMOL/L
TRIGL SERPL-MCNC: 97 MG/DL
TSH SERPL-ACNC: 3.73 UIU/ML
URATE SERPL-MCNC: 4 MG/DL
WBC # FLD AUTO: 6.28 K/UL

## 2024-08-10 ENCOUNTER — RX RENEWAL (OUTPATIENT)
Age: 89
End: 2024-08-10

## 2024-10-15 ENCOUNTER — NON-APPOINTMENT (OUTPATIENT)
Age: 89
End: 2024-10-15

## 2024-11-13 ENCOUNTER — RX RENEWAL (OUTPATIENT)
Age: 89
End: 2024-11-13

## 2024-12-04 ENCOUNTER — APPOINTMENT (OUTPATIENT)
Dept: INTERNAL MEDICINE | Facility: CLINIC | Age: 88
End: 2024-12-04
Payer: MEDICARE

## 2024-12-04 VITALS
SYSTOLIC BLOOD PRESSURE: 138 MMHG | RESPIRATION RATE: 13 BRPM | HEART RATE: 64 BPM | DIASTOLIC BLOOD PRESSURE: 88 MMHG | HEIGHT: 62.5 IN | OXYGEN SATURATION: 98 % | BODY MASS INDEX: 29.96 KG/M2 | WEIGHT: 167 LBS

## 2024-12-04 DIAGNOSIS — K58.9 IRRITABLE BOWEL SYNDROME, UNSPECIFIED: ICD-10-CM

## 2024-12-04 DIAGNOSIS — I10 ESSENTIAL (PRIMARY) HYPERTENSION: ICD-10-CM

## 2024-12-04 DIAGNOSIS — Z79.899 OTHER LONG TERM (CURRENT) DRUG THERAPY: ICD-10-CM

## 2024-12-04 DIAGNOSIS — M85.80 OTHER SPECIFIED DISORDERS OF BONE DENSITY AND STRUCTURE, UNSPECIFIED SITE: ICD-10-CM

## 2024-12-04 DIAGNOSIS — J45.909 UNSPECIFIED ASTHMA, UNCOMPLICATED: ICD-10-CM

## 2024-12-04 DIAGNOSIS — I25.10 ATHEROSCLEROTIC HEART DISEASE OF NATIVE CORONARY ARTERY W/OUT ANGINA PECTORIS: ICD-10-CM

## 2024-12-04 DIAGNOSIS — E78.00 PURE HYPERCHOLESTEROLEMIA, UNSPECIFIED: ICD-10-CM

## 2024-12-04 DIAGNOSIS — M19.90 UNSPECIFIED OSTEOARTHRITIS, UNSPECIFIED SITE: ICD-10-CM

## 2024-12-04 DIAGNOSIS — R73.03 PREDIABETES.: ICD-10-CM

## 2024-12-04 DIAGNOSIS — D32.9 BENIGN NEOPLASM OF MENINGES, UNSPECIFIED: ICD-10-CM

## 2024-12-04 DIAGNOSIS — M10.9 GOUT, UNSPECIFIED: ICD-10-CM

## 2024-12-04 DIAGNOSIS — Z00.00 ENCOUNTER FOR GENERAL ADULT MEDICAL EXAMINATION W/OUT ABNORMAL FINDINGS: ICD-10-CM

## 2024-12-04 PROCEDURE — 36415 COLL VENOUS BLD VENIPUNCTURE: CPT

## 2024-12-04 PROCEDURE — G0439: CPT

## 2024-12-06 LAB
25(OH)D3 SERPL-MCNC: 62.7 NG/ML
ALBUMIN SERPL ELPH-MCNC: 4.7 G/DL
ALP BLD-CCNC: 81 U/L
ALT SERPL-CCNC: 21 U/L
ANION GAP SERPL CALC-SCNC: 18 MMOL/L
APPEARANCE: CLEAR
AST SERPL-CCNC: 22 U/L
BACTERIA: NEGATIVE /HPF
BASOPHILS # BLD AUTO: 0.1 K/UL
BASOPHILS NFR BLD AUTO: 1.7 %
BILIRUB SERPL-MCNC: <0.2 MG/DL
BILIRUBIN URINE: NEGATIVE
BLOOD URINE: NEGATIVE
BUN SERPL-MCNC: 20 MG/DL
CALCIUM SERPL-MCNC: 9.6 MG/DL
CAST: 0 /LPF
CHLORIDE SERPL-SCNC: 95 MMOL/L
CHOLEST SERPL-MCNC: 216 MG/DL
CO2 SERPL-SCNC: 20 MMOL/L
COLOR: YELLOW
CREAT SERPL-MCNC: 0.63 MG/DL
EGFR: 85 ML/MIN/1.73M2
EOSINOPHIL # BLD AUTO: 0.17 K/UL
EOSINOPHIL NFR BLD AUTO: 2.9 %
EPITHELIAL CELLS: 1 /HPF
ESTIMATED AVERAGE GLUCOSE: 114 MG/DL
GLUCOSE QUALITATIVE U: NEGATIVE MG/DL
GLUCOSE SERPL-MCNC: 98 MG/DL
HBA1C MFR BLD HPLC: 5.6 %
HCT VFR BLD CALC: 46.2 %
HDLC SERPL-MCNC: 66 MG/DL
HGB BLD-MCNC: 14.7 G/DL
IMM GRANULOCYTES NFR BLD AUTO: 0.7 %
KETONES URINE: NEGATIVE MG/DL
LDLC SERPL CALC-MCNC: 131 MG/DL
LEUKOCYTE ESTERASE URINE: ABNORMAL
LYMPHOCYTES # BLD AUTO: 1.41 K/UL
LYMPHOCYTES NFR BLD AUTO: 23.7 %
MAGNESIUM SERPL-MCNC: 2.6 MG/DL
MAN DIFF?: NORMAL
MCHC RBC-ENTMCNC: 29.1 PG
MCHC RBC-ENTMCNC: 31.8 G/DL
MCV RBC AUTO: 91.5 FL
MICROSCOPIC-UA: NORMAL
MONOCYTES # BLD AUTO: 0.51 K/UL
MONOCYTES NFR BLD AUTO: 8.6 %
NEUTROPHILS # BLD AUTO: 3.71 K/UL
NEUTROPHILS NFR BLD AUTO: 62.4 %
NITRITE URINE: NEGATIVE
NONHDLC SERPL-MCNC: 150 MG/DL
PH URINE: 5.5
PLATELET # BLD AUTO: 330 K/UL
POTASSIUM SERPL-SCNC: 5.2 MMOL/L
PROT SERPL-MCNC: 7.5 G/DL
PROTEIN URINE: NEGATIVE MG/DL
RBC # BLD: 5.05 M/UL
RBC # FLD: 15 %
RED BLOOD CELLS URINE: 0 /HPF
SODIUM SERPL-SCNC: 134 MMOL/L
SPECIFIC GRAVITY URINE: 1.02
TRIGL SERPL-MCNC: 107 MG/DL
URATE SERPL-MCNC: 4.5 MG/DL
UROBILINOGEN URINE: 0.2 MG/DL
VIT B12 SERPL-MCNC: 611 PG/ML
WBC # FLD AUTO: 5.94 K/UL
WHITE BLOOD CELLS URINE: 2 /HPF

## 2025-01-03 ENCOUNTER — RX RENEWAL (OUTPATIENT)
Age: 89
End: 2025-01-03

## 2025-01-06 ENCOUNTER — APPOINTMENT (OUTPATIENT)
Dept: GASTROENTEROLOGY | Facility: CLINIC | Age: 89
End: 2025-01-06

## 2025-01-06 ENCOUNTER — NON-APPOINTMENT (OUTPATIENT)
Age: 89
End: 2025-01-06

## 2025-01-06 DIAGNOSIS — K57.30 DIVERTICULOSIS OF LARGE INTESTINE W/OUT PERFORATION OR ABSCESS W/OUT BLEEDING: ICD-10-CM

## 2025-01-06 DIAGNOSIS — K52.9 NONINFECTIVE GASTROENTERITIS AND COLITIS, UNSPECIFIED: ICD-10-CM

## 2025-01-06 PROCEDURE — 99212 OFFICE O/P EST SF 10 MIN: CPT | Mod: 93

## 2025-01-08 ENCOUNTER — APPOINTMENT (OUTPATIENT)
Dept: CARDIOLOGY | Facility: CLINIC | Age: 89
End: 2025-01-08
Payer: MEDICARE

## 2025-01-08 VITALS
WEIGHT: 165 LBS | HEIGHT: 62.5 IN | HEART RATE: 65 BPM | DIASTOLIC BLOOD PRESSURE: 72 MMHG | BODY MASS INDEX: 29.6 KG/M2 | OXYGEN SATURATION: 95 % | SYSTOLIC BLOOD PRESSURE: 144 MMHG

## 2025-01-08 VITALS — HEART RATE: 65 BPM | SYSTOLIC BLOOD PRESSURE: 148 MMHG | OXYGEN SATURATION: 94 % | DIASTOLIC BLOOD PRESSURE: 76 MMHG

## 2025-01-08 VITALS — SYSTOLIC BLOOD PRESSURE: 140 MMHG | OXYGEN SATURATION: 93 % | DIASTOLIC BLOOD PRESSURE: 68 MMHG | HEART RATE: 54 BPM

## 2025-01-08 VITALS — SYSTOLIC BLOOD PRESSURE: 146 MMHG | DIASTOLIC BLOOD PRESSURE: 70 MMHG | OXYGEN SATURATION: 93 % | HEART RATE: 61 BPM

## 2025-01-08 DIAGNOSIS — I25.10 ATHEROSCLEROTIC HEART DISEASE OF NATIVE CORONARY ARTERY W/OUT ANGINA PECTORIS: ICD-10-CM

## 2025-01-08 DIAGNOSIS — I95.1 ORTHOSTATIC HYPOTENSION: ICD-10-CM

## 2025-01-08 DIAGNOSIS — R55 SYNCOPE AND COLLAPSE: ICD-10-CM

## 2025-01-08 PROCEDURE — 99214 OFFICE O/P EST MOD 30 MIN: CPT

## 2025-01-08 PROCEDURE — 93000 ELECTROCARDIOGRAM COMPLETE: CPT

## 2025-01-09 ENCOUNTER — APPOINTMENT (OUTPATIENT)
Dept: CARDIOLOGY | Facility: CLINIC | Age: 89
End: 2025-01-09

## 2025-01-15 ENCOUNTER — APPOINTMENT (OUTPATIENT)
Dept: CARDIOLOGY | Facility: CLINIC | Age: 89
End: 2025-01-15
Payer: MEDICARE

## 2025-01-15 PROCEDURE — 93308 TTE F-UP OR LMTD: CPT

## 2025-01-21 ENCOUNTER — APPOINTMENT (OUTPATIENT)
Dept: GASTROENTEROLOGY | Facility: CLINIC | Age: 89
End: 2025-01-21
Payer: MEDICARE

## 2025-01-21 ENCOUNTER — NON-APPOINTMENT (OUTPATIENT)
Age: 89
End: 2025-01-21

## 2025-01-21 VITALS
SYSTOLIC BLOOD PRESSURE: 161 MMHG | WEIGHT: 172 LBS | DIASTOLIC BLOOD PRESSURE: 81 MMHG | HEIGHT: 62.5 IN | HEART RATE: 66 BPM | BODY MASS INDEX: 30.86 KG/M2 | OXYGEN SATURATION: 97 % | RESPIRATION RATE: 14 BRPM | TEMPERATURE: 97.3 F

## 2025-01-21 DIAGNOSIS — Z09 ENCOUNTER FOR FOLLOW-UP EXAMINATION AFTER COMPLETED TREATMENT FOR CONDITIONS OTHER THAN MALIGNANT NEOPLASM: ICD-10-CM

## 2025-01-21 DIAGNOSIS — K52.9 NONINFECTIVE GASTROENTERITIS AND COLITIS, UNSPECIFIED: ICD-10-CM

## 2025-01-21 DIAGNOSIS — K63.5 POLYP OF COLON: ICD-10-CM

## 2025-01-21 PROCEDURE — G2211 COMPLEX E/M VISIT ADD ON: CPT

## 2025-01-21 PROCEDURE — 99214 OFFICE O/P EST MOD 30 MIN: CPT

## 2025-01-21 RX ORDER — BACILLUS COAGULANS/INULIN 1B-250 MG
CAPSULE ORAL
Refills: 0 | Status: ACTIVE | COMMUNITY

## 2025-01-21 RX ORDER — CALCIUM CITRATE/VITAMIN D3 315MG-6.25
TABLET ORAL
Refills: 0 | Status: ACTIVE | COMMUNITY

## 2025-01-29 ENCOUNTER — OUTPATIENT (OUTPATIENT)
Dept: OUTPATIENT SERVICES | Facility: HOSPITAL | Age: 89
LOS: 1 days | End: 2025-01-29
Payer: MEDICARE

## 2025-01-29 DIAGNOSIS — R13.19 OTHER DYSPHAGIA: ICD-10-CM

## 2025-01-29 PROCEDURE — 74220 X-RAY XM ESOPHAGUS 1CNTRST: CPT | Mod: 26

## 2025-01-29 PROCEDURE — 74220 X-RAY XM ESOPHAGUS 1CNTRST: CPT

## 2025-02-06 ENCOUNTER — RX RENEWAL (OUTPATIENT)
Age: 89
End: 2025-02-06

## 2025-03-06 ENCOUNTER — APPOINTMENT (OUTPATIENT)
Dept: INTERNAL MEDICINE | Facility: CLINIC | Age: 89
End: 2025-03-06
Payer: MEDICARE

## 2025-03-06 VITALS
HEART RATE: 71 BPM | BODY MASS INDEX: 30.25 KG/M2 | SYSTOLIC BLOOD PRESSURE: 120 MMHG | OXYGEN SATURATION: 97 % | DIASTOLIC BLOOD PRESSURE: 78 MMHG | RESPIRATION RATE: 13 BRPM | WEIGHT: 168.6 LBS | HEIGHT: 62.5 IN

## 2025-03-06 DIAGNOSIS — R73.03 PREDIABETES.: ICD-10-CM

## 2025-03-06 DIAGNOSIS — I25.10 ATHEROSCLEROTIC HEART DISEASE OF NATIVE CORONARY ARTERY W/OUT ANGINA PECTORIS: ICD-10-CM

## 2025-03-06 DIAGNOSIS — Z79.899 OTHER LONG TERM (CURRENT) DRUG THERAPY: ICD-10-CM

## 2025-03-06 DIAGNOSIS — I10 ESSENTIAL (PRIMARY) HYPERTENSION: ICD-10-CM

## 2025-03-06 DIAGNOSIS — E78.00 PURE HYPERCHOLESTEROLEMIA, UNSPECIFIED: ICD-10-CM

## 2025-03-06 DIAGNOSIS — E66.9 OBESITY, UNSPECIFIED: ICD-10-CM

## 2025-03-06 DIAGNOSIS — Z02.89 ENCOUNTER FOR OTHER ADMINISTRATIVE EXAMINATIONS: ICD-10-CM

## 2025-03-06 PROCEDURE — 99214 OFFICE O/P EST MOD 30 MIN: CPT

## 2025-03-06 PROCEDURE — G2211 COMPLEX E/M VISIT ADD ON: CPT

## 2025-03-06 PROCEDURE — 36415 COLL VENOUS BLD VENIPUNCTURE: CPT

## 2025-03-07 LAB
ALBUMIN SERPL ELPH-MCNC: 4.5 G/DL
ALP BLD-CCNC: 80 U/L
ALT SERPL-CCNC: 20 U/L
ANION GAP SERPL CALC-SCNC: 16 MMOL/L
AST SERPL-CCNC: 17 U/L
BASOPHILS # BLD AUTO: 0.07 K/UL
BASOPHILS NFR BLD AUTO: 1.2 %
BILIRUB SERPL-MCNC: 0.4 MG/DL
BUN SERPL-MCNC: 20 MG/DL
CALCIUM SERPL-MCNC: 9.3 MG/DL
CHLORIDE SERPL-SCNC: 102 MMOL/L
CHOLEST SERPL-MCNC: 198 MG/DL
CO2 SERPL-SCNC: 21 MMOL/L
CREAT SERPL-MCNC: 0.67 MG/DL
EGFRCR SERPLBLD CKD-EPI 2021: 84 ML/MIN/1.73M2
EOSINOPHIL # BLD AUTO: 0.19 K/UL
EOSINOPHIL NFR BLD AUTO: 3.3 %
ESTIMATED AVERAGE GLUCOSE: 117 MG/DL
GLUCOSE SERPL-MCNC: 102 MG/DL
HBA1C MFR BLD HPLC: 5.7 %
HCT VFR BLD CALC: 41.9 %
HDLC SERPL-MCNC: 60 MG/DL
HGB BLD-MCNC: 13.9 G/DL
IMM GRANULOCYTES NFR BLD AUTO: 1 %
LDLC SERPL CALC-MCNC: 124 MG/DL
LYMPHOCYTES # BLD AUTO: 1.29 K/UL
LYMPHOCYTES NFR BLD AUTO: 22.1 %
MAN DIFF?: NORMAL
MCHC RBC-ENTMCNC: 29 PG
MCHC RBC-ENTMCNC: 33.2 G/DL
MCV RBC AUTO: 87.3 FL
MONOCYTES # BLD AUTO: 0.53 K/UL
MONOCYTES NFR BLD AUTO: 9.1 %
NEUTROPHILS # BLD AUTO: 3.7 K/UL
NEUTROPHILS NFR BLD AUTO: 63.3 %
NONHDLC SERPL-MCNC: 138 MG/DL
PLATELET # BLD AUTO: 365 K/UL
POTASSIUM SERPL-SCNC: 4.7 MMOL/L
PROT SERPL-MCNC: 6.9 G/DL
RBC # BLD: 4.8 M/UL
RBC # FLD: 14.4 %
SODIUM SERPL-SCNC: 138 MMOL/L
TRIGL SERPL-MCNC: 79 MG/DL
TSH SERPL-ACNC: 2.89 UIU/ML
URATE SERPL-MCNC: 5.1 MG/DL
WBC # FLD AUTO: 5.84 K/UL

## 2025-03-24 ENCOUNTER — APPOINTMENT (OUTPATIENT)
Dept: INTERNAL MEDICINE | Facility: CLINIC | Age: 89
End: 2025-03-24
Payer: MEDICARE

## 2025-03-24 VITALS
HEART RATE: 64 BPM | SYSTOLIC BLOOD PRESSURE: 130 MMHG | DIASTOLIC BLOOD PRESSURE: 80 MMHG | BODY MASS INDEX: 30.06 KG/M2 | WEIGHT: 167 LBS | RESPIRATION RATE: 14 BRPM

## 2025-03-24 DIAGNOSIS — R50.9 FEVER, UNSPECIFIED: ICD-10-CM

## 2025-03-24 DIAGNOSIS — R53.81 OTHER MALAISE: ICD-10-CM

## 2025-03-24 DIAGNOSIS — R68.83 CHILLS (WITHOUT FEVER): ICD-10-CM

## 2025-03-24 DIAGNOSIS — Z11.59 ENCOUNTER FOR SCREENING FOR OTHER VIRAL DISEASES: ICD-10-CM

## 2025-03-24 DIAGNOSIS — R53.83 OTHER MALAISE: ICD-10-CM

## 2025-03-24 PROCEDURE — 36415 COLL VENOUS BLD VENIPUNCTURE: CPT

## 2025-03-24 PROCEDURE — 99213 OFFICE O/P EST LOW 20 MIN: CPT

## 2025-03-24 PROCEDURE — G2211 COMPLEX E/M VISIT ADD ON: CPT

## 2025-03-26 LAB
ALBUMIN SERPL ELPH-MCNC: 4.5 G/DL
ALP BLD-CCNC: 81 U/L
ALT SERPL-CCNC: 22 U/L
ANION GAP SERPL CALC-SCNC: 14 MMOL/L
AST SERPL-CCNC: 17 U/L
BASOPHILS # BLD AUTO: 0.06 K/UL
BASOPHILS NFR BLD AUTO: 0.9 %
BILIRUB SERPL-MCNC: 0.2 MG/DL
BUN SERPL-MCNC: 20 MG/DL
CALCIUM SERPL-MCNC: 9.5 MG/DL
CHLORIDE SERPL-SCNC: 98 MMOL/L
CO2 SERPL-SCNC: 22 MMOL/L
CREAT SERPL-MCNC: 1.16 MG/DL
EGFRCR SERPLBLD CKD-EPI 2021: 45 ML/MIN/1.73M2
EOSINOPHIL # BLD AUTO: 0.07 K/UL
EOSINOPHIL NFR BLD AUTO: 1.1 %
GLUCOSE SERPL-MCNC: 114 MG/DL
HCT VFR BLD CALC: 42.3 %
HGB BLD-MCNC: 13.7 G/DL
IMM GRANULOCYTES NFR BLD AUTO: 0.9 %
LYMPHOCYTES # BLD AUTO: 1.03 K/UL
LYMPHOCYTES NFR BLD AUTO: 16 %
MAN DIFF?: NORMAL
MCHC RBC-ENTMCNC: 29 PG
MCHC RBC-ENTMCNC: 32.4 G/DL
MCV RBC AUTO: 89.6 FL
MONOCYTES # BLD AUTO: 0.47 K/UL
MONOCYTES NFR BLD AUTO: 7.3 %
NEUTROPHILS # BLD AUTO: 4.74 K/UL
NEUTROPHILS NFR BLD AUTO: 73.8 %
PLATELET # BLD AUTO: 400 K/UL
POTASSIUM SERPL-SCNC: 5.3 MMOL/L
PROT SERPL-MCNC: 6.7 G/DL
RBC # BLD: 4.72 M/UL
RBC # FLD: 14.6 %
RESP PATH DNA+RNA PNL NPH NAA+NON-PROBE: NOT DETECTED
SARS-COV-2 RNA RESP QL NAA+PROBE: NOT DETECTED
SODIUM SERPL-SCNC: 134 MMOL/L
WBC # FLD AUTO: 6.43 K/UL

## 2025-03-28 ENCOUNTER — EMERGENCY (EMERGENCY)
Facility: HOSPITAL | Age: 89
LOS: 1 days | Discharge: HOME | End: 2025-03-28
Attending: EMERGENCY MEDICINE
Payer: MEDICARE

## 2025-03-28 VITALS
DIASTOLIC BLOOD PRESSURE: 79 MMHG | OXYGEN SATURATION: 97 % | RESPIRATION RATE: 17 BRPM | HEART RATE: 75 BPM | WEIGHT: 171.3 LBS | SYSTOLIC BLOOD PRESSURE: 149 MMHG | TEMPERATURE: 98 F

## 2025-03-28 VITALS
HEART RATE: 62 BPM | TEMPERATURE: 98 F | OXYGEN SATURATION: 97 % | DIASTOLIC BLOOD PRESSURE: 78 MMHG | RESPIRATION RATE: 16 BRPM | SYSTOLIC BLOOD PRESSURE: 155 MMHG

## 2025-03-28 LAB
ALBUMIN SERPL ELPH-MCNC: 4 G/DL — SIGNIFICANT CHANGE UP (ref 3.3–5.2)
ALP SERPL-CCNC: 75 U/L — SIGNIFICANT CHANGE UP (ref 40–120)
ALT FLD-CCNC: 20 U/L — SIGNIFICANT CHANGE UP
ANION GAP SERPL CALC-SCNC: 11 MMOL/L — SIGNIFICANT CHANGE UP (ref 5–17)
ANION GAP SERPL CALC-SCNC: 12 MMOL/L — SIGNIFICANT CHANGE UP (ref 5–17)
APPEARANCE UR: CLEAR — SIGNIFICANT CHANGE UP
AST SERPL-CCNC: 20 U/L — SIGNIFICANT CHANGE UP
BASOPHILS # BLD AUTO: 0.07 K/UL — SIGNIFICANT CHANGE UP (ref 0–0.2)
BASOPHILS NFR BLD AUTO: 1.3 % — SIGNIFICANT CHANGE UP (ref 0–2)
BILIRUB SERPL-MCNC: <0.2 MG/DL — LOW (ref 0.4–2)
BILIRUB UR-MCNC: NEGATIVE — SIGNIFICANT CHANGE UP
BUN SERPL-MCNC: 14.2 MG/DL — SIGNIFICANT CHANGE UP (ref 8–20)
BUN SERPL-MCNC: 17 MG/DL — SIGNIFICANT CHANGE UP (ref 8–20)
CALCIUM SERPL-MCNC: 8.7 MG/DL — SIGNIFICANT CHANGE UP (ref 8.4–10.5)
CALCIUM SERPL-MCNC: 8.8 MG/DL — SIGNIFICANT CHANGE UP (ref 8.4–10.5)
CAST: 0 /LPF — SIGNIFICANT CHANGE UP (ref 0–4)
CHLORIDE SERPL-SCNC: 94 MMOL/L — LOW (ref 96–108)
CHLORIDE SERPL-SCNC: 96 MMOL/L — SIGNIFICANT CHANGE UP (ref 96–108)
CO2 SERPL-SCNC: 22 MMOL/L — SIGNIFICANT CHANGE UP (ref 22–29)
CO2 SERPL-SCNC: 24 MMOL/L — SIGNIFICANT CHANGE UP (ref 22–29)
COLOR SPEC: YELLOW — SIGNIFICANT CHANGE UP
CREAT SERPL-MCNC: 0.73 MG/DL — SIGNIFICANT CHANGE UP (ref 0.5–1.3)
CREAT SERPL-MCNC: 0.89 MG/DL — SIGNIFICANT CHANGE UP (ref 0.5–1.3)
DIFF PNL FLD: NEGATIVE — SIGNIFICANT CHANGE UP
EGFR: 62 ML/MIN/1.73M2 — SIGNIFICANT CHANGE UP
EGFR: 62 ML/MIN/1.73M2 — SIGNIFICANT CHANGE UP
EGFR: 79 ML/MIN/1.73M2 — SIGNIFICANT CHANGE UP
EOSINOPHIL # BLD AUTO: 0.06 K/UL — SIGNIFICANT CHANGE UP (ref 0–0.5)
EOSINOPHIL NFR BLD AUTO: 1.1 % — SIGNIFICANT CHANGE UP (ref 0–6)
GLUCOSE SERPL-MCNC: 110 MG/DL — HIGH (ref 70–99)
GLUCOSE SERPL-MCNC: 93 MG/DL — SIGNIFICANT CHANGE UP (ref 70–99)
GLUCOSE UR QL: NEGATIVE MG/DL — SIGNIFICANT CHANGE UP
HCT VFR BLD CALC: 39 % — SIGNIFICANT CHANGE UP (ref 34.5–45)
HGB BLD-MCNC: 13.1 G/DL — SIGNIFICANT CHANGE UP (ref 11.5–15.5)
IMM GRANULOCYTES # BLD AUTO: 0.07 K/UL — SIGNIFICANT CHANGE UP (ref 0–0.07)
IMM GRANULOCYTES NFR BLD AUTO: 1.3 % — HIGH (ref 0–0.9)
KETONES UR-MCNC: NEGATIVE MG/DL — SIGNIFICANT CHANGE UP
LEUKOCYTE ESTERASE UR-ACNC: ABNORMAL
LIDOCAIN IGE QN: 28 U/L — SIGNIFICANT CHANGE UP (ref 22–51)
LYMPHOCYTES # BLD AUTO: 0.65 K/UL — LOW (ref 1–3.3)
LYMPHOCYTES NFR BLD AUTO: 12 % — LOW (ref 13–44)
MCHC RBC-ENTMCNC: 29.1 PG — SIGNIFICANT CHANGE UP (ref 27–34)
MCHC RBC-ENTMCNC: 33.6 G/DL — SIGNIFICANT CHANGE UP (ref 32–36)
MCV RBC AUTO: 86.7 FL — SIGNIFICANT CHANGE UP (ref 80–100)
MONOCYTES # BLD AUTO: 0.7 K/UL — SIGNIFICANT CHANGE UP (ref 0–0.9)
NEUTROPHILS # BLD AUTO: 3.86 K/UL — SIGNIFICANT CHANGE UP (ref 1.8–7.4)
NEUTROPHILS NFR BLD AUTO: 71.4 % — SIGNIFICANT CHANGE UP (ref 43–77)
NITRITE UR-MCNC: NEGATIVE — SIGNIFICANT CHANGE UP
NRBC # BLD AUTO: 0 K/UL — SIGNIFICANT CHANGE UP (ref 0–0)
NRBC # FLD: 0 K/UL — SIGNIFICANT CHANGE UP (ref 0–0)
NRBC BLD AUTO-RTO: 0 /100 WBCS — SIGNIFICANT CHANGE UP (ref 0–0)
PH UR: 6 — SIGNIFICANT CHANGE UP (ref 5–8)
PLATELET # BLD AUTO: 346 K/UL — SIGNIFICANT CHANGE UP (ref 150–400)
PMV BLD: 9 FL — SIGNIFICANT CHANGE UP (ref 7–13)
POTASSIUM SERPL-MCNC: 4.9 MMOL/L — SIGNIFICANT CHANGE UP (ref 3.5–5.3)
POTASSIUM SERPL-MCNC: 5 MMOL/L — SIGNIFICANT CHANGE UP (ref 3.5–5.3)
POTASSIUM SERPL-SCNC: 4.9 MMOL/L — SIGNIFICANT CHANGE UP (ref 3.5–5.3)
POTASSIUM SERPL-SCNC: 5 MMOL/L — SIGNIFICANT CHANGE UP (ref 3.5–5.3)
PROT SERPL-MCNC: 6.4 G/DL — LOW (ref 6.6–8.7)
PROT UR-MCNC: NEGATIVE MG/DL — SIGNIFICANT CHANGE UP
RBC # BLD: 4.5 M/UL — SIGNIFICANT CHANGE UP (ref 3.8–5.2)
RBC # FLD: 14 % — SIGNIFICANT CHANGE UP (ref 10.3–14.5)
RBC CASTS # UR COMP ASSIST: 0 /HPF — SIGNIFICANT CHANGE UP (ref 0–4)
SODIUM SERPL-SCNC: 128 MMOL/L — LOW (ref 135–145)
SODIUM SERPL-SCNC: 131 MMOL/L — LOW (ref 135–145)
SP GR SPEC: 1.01 — SIGNIFICANT CHANGE UP (ref 1–1.03)
SQUAMOUS # UR AUTO: 0 /HPF — SIGNIFICANT CHANGE UP (ref 0–5)
TROPONIN T, HIGH SENSITIVITY RESULT: 14 NG/L — SIGNIFICANT CHANGE UP (ref 0–51)
TROPONIN T, HIGH SENSITIVITY RESULT: 15 NG/L — SIGNIFICANT CHANGE UP (ref 0–51)
UROBILINOGEN FLD QL: 0.2 MG/DL — SIGNIFICANT CHANGE UP (ref 0.2–1)
WBC # BLD: 5.41 K/UL — SIGNIFICANT CHANGE UP (ref 3.8–10.5)
WBC # FLD AUTO: 5.41 K/UL — SIGNIFICANT CHANGE UP (ref 3.8–10.5)
WBC UR QL: 3 /HPF — SIGNIFICANT CHANGE UP (ref 0–5)

## 2025-03-28 PROCEDURE — 99285 EMERGENCY DEPT VISIT HI MDM: CPT | Mod: 25

## 2025-03-28 PROCEDURE — 74177 CT ABD & PELVIS W/CONTRAST: CPT | Mod: 26

## 2025-03-28 PROCEDURE — 85025 COMPLETE CBC W/AUTO DIFF WBC: CPT

## 2025-03-28 PROCEDURE — 93010 ELECTROCARDIOGRAM REPORT: CPT

## 2025-03-28 PROCEDURE — 96374 THER/PROPH/DIAG INJ IV PUSH: CPT | Mod: XU

## 2025-03-28 PROCEDURE — 80048 BASIC METABOLIC PNL TOTAL CA: CPT

## 2025-03-28 PROCEDURE — 74177 CT ABD & PELVIS W/CONTRAST: CPT | Mod: MC

## 2025-03-28 PROCEDURE — 36415 COLL VENOUS BLD VENIPUNCTURE: CPT

## 2025-03-28 PROCEDURE — 83605 ASSAY OF LACTIC ACID: CPT

## 2025-03-28 PROCEDURE — 84484 ASSAY OF TROPONIN QUANT: CPT

## 2025-03-28 PROCEDURE — 80053 COMPREHEN METABOLIC PANEL: CPT

## 2025-03-28 PROCEDURE — 83690 ASSAY OF LIPASE: CPT

## 2025-03-28 PROCEDURE — 81001 URINALYSIS AUTO W/SCOPE: CPT

## 2025-03-28 PROCEDURE — 93005 ELECTROCARDIOGRAM TRACING: CPT

## 2025-03-28 PROCEDURE — 99285 EMERGENCY DEPT VISIT HI MDM: CPT

## 2025-03-28 RX ORDER — ACETAMINOPHEN 500 MG/5ML
1000 LIQUID (ML) ORAL ONCE
Refills: 0 | Status: COMPLETED | OUTPATIENT
Start: 2025-03-28 | End: 2025-03-28

## 2025-03-28 RX ORDER — SODIUM CHLORIDE 9 G/1000ML
1000 INJECTION, SOLUTION INTRAVENOUS ONCE
Refills: 0 | Status: COMPLETED | OUTPATIENT
Start: 2025-03-28 | End: 2025-03-28

## 2025-03-28 RX ADMIN — Medication 1000 MILLIGRAM(S): at 10:01

## 2025-03-28 RX ADMIN — Medication 400 MILLIGRAM(S): at 09:03

## 2025-03-28 RX ADMIN — SODIUM CHLORIDE 1000 MILLILITER(S): 9 INJECTION, SOLUTION INTRAVENOUS at 09:03

## 2025-03-28 NOTE — ED ADULT NURSE NOTE - OBJECTIVE STATEMENT
Pt presents to ED c/o chest discomfort, chills and urinary burning. Pt has UTI and finished bactrim. Connected to cardiac monitor-NSR on room air. AOx3, alert and calm. Airway patent RR even unlabored. Abdomen soft. Awaiting CT. Pt presents to ED c/o chest discomfort, chills. Pt has UTI and finished bactrim. Connected to cardiac monitor-NSR on room air. AOx3, alert and calm. Airway patent RR even unlabored. Abdomen soft. Awaiting CT.

## 2025-03-28 NOTE — ED ADULT TRIAGE NOTE - CHIEF COMPLAINT QUOTE
pt presents c/o soreness under sternum, excessive belching, chills for a few weeks. also went to UC for chills for a few weeks, diagnosed w/UTI & placed on bactrim.

## 2025-03-28 NOTE — ED PROVIDER NOTE - OBJECTIVE STATEMENT
89 year old female with PMh HTN, HLD, colitis, asthma presents with abd pain and nausea. Pt 89 year old female with PMh HTN, HLD, colitis, asthma presents with abd pain and nausea. Pt reports she has had 2 weeks of urinary urgency. She went to urgent care, diagnosed with UTI and started on bactrim. She reports that she has had 5 days of epigastric intermittent discomfort and nausea. pain is not related to PO intake or exertion. no SOB or vomiting but pt does endorse diarrhea.

## 2025-03-28 NOTE — ED PROVIDER NOTE - PATIENT PORTAL LINK FT
You can access the FollowMyHealth Patient Portal offered by [facility] by registering at the following website: [email or web address]. By joining [employer]’s FollowMyHealth portal, you will also be able to view your health information using other applications (apps) compatible with our system.

## 2025-03-28 NOTE — ED PROVIDER NOTE - CLINICAL SUMMARY MEDICAL DECISION MAKING FREE TEXT BOX
Pt states that she feels much better. Abd is soft and non-tender. She never had chest pain and her Sx were locate din the epigastric. trop neg x 2. CT neg. no UTI. After hydration feels better and Na improved, stable for dc

## 2025-03-28 NOTE — ED ADULT NURSE NOTE - NSFALLCONCLUSION_ED_ALL_ED
24-Jun-2019 16:18 27-Jun-2019 14:20 24-Jun-2019 12:30 24-Jun-2019 15:15 24-Jun-2019 19:52 Universal Safety Interventions

## 2025-03-28 NOTE — ED PROVIDER NOTE - CARDIAC, MLM
Date of Service: 03/27/2017    Neurologic Office Followup    Followup for this patient seen in the past for ongoing care of Parkinson disease.  Since her last visit here, the patient overall has been stable, though she has had some complaints of transient feelings of lightheadedness, particularly when she bends down or looks up.  She does not describe a sense of true rotational vertigo, but rather states she feels somewhat lightheaded and a bit unsteady.  She has not fallen.    The patient was recently hospitalized for \"flu and pneumonia\".    PHYSICAL EXAMINATION:  MENTAL STATUS:  The patient is pleasant, awake, alert and cooperative.  Speech and language parameters appear normal to limited bedside assessment.  Cranial nerve examination is unremarkable.  Optic discs are not well seen.  Extraocular movements are full.  Pupils are equal, round and reactive at approximately 3 mm bilaterally.  Muscles of mastication are intact.  There is no facial weakness.  Shoulder shrug is symmetric.  The tongue protrudes in the midline.  Motor examination reveals a hint of cogwheeling at the elbows and wrists.  Left side appears to be more affected than the right.  An intermittent pill-rolling tremor is noted in both upper extremities, left side greater than right.  I appreciate no tremor or cogwheeling in the lower extremities.  Power is judged to be 5/5 in the upper and lower extremities proximally and distally.  Reflexes are 1+ throughout.  The patient walks well.  The tremor in the upper extremities is accentuated on ambulation.    IMPRESSION:  1.  Parkinson disease, appears stable, though with the patient reporting some transient feelings of lightheadedness.  2.  Medication management.    PLAN:  The patient will reduce her Pramipexole dose to 0.5 mg 1 tablet 4 times daily, this is down from 1-1/2 tablets 4 times daily.  All other medications will remain unchanged.    The patient will contact this office in 1 week's time for  followup regarding her response to the above.  Further recommendations would be offered thereafter as appropriate.    A copy of this report is sent to Ori Saba MD for review.      Dictated By: Samir Beltre MD  Signing Provider: Samir Beltre MD    SV/so ([id number])  DD: 03/27/2017 13:59:59 TD: 03/28/2017 09:11:32    Copy Sent To:     cc: Ori Saba MD   Normal rate, regular rhythm.  Heart sounds S1, S2.  No murmurs, rubs or gallops.

## 2025-03-31 LAB — BACTERIA BLD CULT: NORMAL

## 2025-04-23 ENCOUNTER — APPOINTMENT (OUTPATIENT)
Dept: GASTROENTEROLOGY | Facility: CLINIC | Age: 89
End: 2025-04-23
Payer: MEDICARE

## 2025-04-23 VITALS
BODY MASS INDEX: 29.79 KG/M2 | SYSTOLIC BLOOD PRESSURE: 137 MMHG | TEMPERATURE: 97.2 F | HEIGHT: 62.5 IN | DIASTOLIC BLOOD PRESSURE: 85 MMHG | OXYGEN SATURATION: 97 % | HEART RATE: 79 BPM | WEIGHT: 166 LBS | RESPIRATION RATE: 14 BRPM

## 2025-04-23 DIAGNOSIS — R19.7 DIARRHEA, UNSPECIFIED: ICD-10-CM

## 2025-04-23 DIAGNOSIS — Z09 ENCOUNTER FOR FOLLOW-UP EXAMINATION AFTER COMPLETED TREATMENT FOR CONDITIONS OTHER THAN MALIGNANT NEOPLASM: ICD-10-CM

## 2025-04-23 DIAGNOSIS — K63.89 OTHER SPECIFIED DISEASES OF INTESTINE: ICD-10-CM

## 2025-04-23 PROCEDURE — 99214 OFFICE O/P EST MOD 30 MIN: CPT

## 2025-04-24 ENCOUNTER — NON-APPOINTMENT (OUTPATIENT)
Age: 89
End: 2025-04-24

## 2025-04-25 ENCOUNTER — NON-APPOINTMENT (OUTPATIENT)
Age: 89
End: 2025-04-25

## 2025-04-25 LAB
DEPRECATED O AND P PREP STL: NORMAL
GI PCR PANEL: NOT DETECTED

## 2025-04-26 ENCOUNTER — NON-APPOINTMENT (OUTPATIENT)
Age: 89
End: 2025-04-26

## 2025-04-28 ENCOUNTER — NON-APPOINTMENT (OUTPATIENT)
Age: 89
End: 2025-04-28

## 2025-04-28 LAB — BACTERIA STL CULT: NORMAL

## 2025-05-01 LAB — PANCREATIC ELASTASE, FECAL: 774 CD:794062645

## 2025-05-02 ENCOUNTER — RX RENEWAL (OUTPATIENT)
Age: 89
End: 2025-05-02

## 2025-05-21 ENCOUNTER — APPOINTMENT (OUTPATIENT)
Dept: CARDIOLOGY | Facility: CLINIC | Age: 89
End: 2025-05-21

## 2025-05-21 ENCOUNTER — NON-APPOINTMENT (OUTPATIENT)
Age: 89
End: 2025-05-21

## 2025-05-21 VITALS
SYSTOLIC BLOOD PRESSURE: 138 MMHG | OXYGEN SATURATION: 95 % | HEIGHT: 62.5 IN | DIASTOLIC BLOOD PRESSURE: 76 MMHG | WEIGHT: 165 LBS | BODY MASS INDEX: 29.6 KG/M2 | HEART RATE: 71 BPM

## 2025-05-21 DIAGNOSIS — Z00.00 ENCOUNTER FOR GENERAL ADULT MEDICAL EXAMINATION W/OUT ABNORMAL FINDINGS: ICD-10-CM

## 2025-05-21 DIAGNOSIS — E78.00 PURE HYPERCHOLESTEROLEMIA, UNSPECIFIED: ICD-10-CM

## 2025-05-21 DIAGNOSIS — R07.89 OTHER CHEST PAIN: ICD-10-CM

## 2025-05-21 DIAGNOSIS — I25.10 ATHEROSCLEROTIC HEART DISEASE OF NATIVE CORONARY ARTERY W/OUT ANGINA PECTORIS: ICD-10-CM

## 2025-05-21 DIAGNOSIS — I10 ESSENTIAL (PRIMARY) HYPERTENSION: ICD-10-CM

## 2025-05-21 DIAGNOSIS — G47.33 OBSTRUCTIVE SLEEP APNEA (ADULT) (PEDIATRIC): ICD-10-CM

## 2025-05-21 DIAGNOSIS — K57.30 DIVERTICULOSIS OF LARGE INTESTINE W/OUT PERFORATION OR ABSCESS W/OUT BLEEDING: ICD-10-CM

## 2025-05-21 PROCEDURE — 93000 ELECTROCARDIOGRAM COMPLETE: CPT

## 2025-05-21 PROCEDURE — 99215 OFFICE O/P EST HI 40 MIN: CPT | Mod: 25

## 2025-05-28 ENCOUNTER — APPOINTMENT (OUTPATIENT)
Dept: GASTROENTEROLOGY | Facility: CLINIC | Age: 89
End: 2025-05-28

## 2025-06-02 ENCOUNTER — APPOINTMENT (OUTPATIENT)
Dept: PULMONOLOGY | Facility: CLINIC | Age: 89
End: 2025-06-02
Payer: MEDICARE

## 2025-06-02 VITALS
SYSTOLIC BLOOD PRESSURE: 139 MMHG | RESPIRATION RATE: 16 BRPM | BODY MASS INDEX: 29.6 KG/M2 | HEIGHT: 62.5 IN | DIASTOLIC BLOOD PRESSURE: 80 MMHG | OXYGEN SATURATION: 96 % | HEART RATE: 69 BPM | WEIGHT: 165 LBS

## 2025-06-02 DIAGNOSIS — R06.02 SHORTNESS OF BREATH: ICD-10-CM

## 2025-06-02 DIAGNOSIS — E66.3 OVERWEIGHT: ICD-10-CM

## 2025-06-02 DIAGNOSIS — G47.33 OBSTRUCTIVE SLEEP APNEA (ADULT) (PEDIATRIC): ICD-10-CM

## 2025-06-02 DIAGNOSIS — J45.909 UNSPECIFIED ASTHMA, UNCOMPLICATED: ICD-10-CM

## 2025-06-02 DIAGNOSIS — G47.00 INSOMNIA, UNSPECIFIED: ICD-10-CM

## 2025-06-02 DIAGNOSIS — E66.9 OBESITY, UNSPECIFIED: ICD-10-CM

## 2025-06-02 DIAGNOSIS — Z86.16 PERSONAL HISTORY OF COVID-19: ICD-10-CM

## 2025-06-02 PROCEDURE — 99214 OFFICE O/P EST MOD 30 MIN: CPT

## 2025-06-02 PROCEDURE — G2211 COMPLEX E/M VISIT ADD ON: CPT

## 2025-06-03 ENCOUNTER — RX RENEWAL (OUTPATIENT)
Age: 89
End: 2025-06-03

## 2025-06-13 ENCOUNTER — EMERGENCY (EMERGENCY)
Facility: HOSPITAL | Age: 89
LOS: 1 days | End: 2025-06-13
Attending: EMERGENCY MEDICINE
Payer: MEDICARE

## 2025-06-13 VITALS
OXYGEN SATURATION: 96 % | HEART RATE: 67 BPM | RESPIRATION RATE: 18 BRPM | TEMPERATURE: 98 F | SYSTOLIC BLOOD PRESSURE: 189 MMHG | DIASTOLIC BLOOD PRESSURE: 79 MMHG

## 2025-06-13 VITALS
TEMPERATURE: 98 F | RESPIRATION RATE: 16 BRPM | DIASTOLIC BLOOD PRESSURE: 72 MMHG | HEIGHT: 63 IN | WEIGHT: 162.92 LBS | OXYGEN SATURATION: 96 % | SYSTOLIC BLOOD PRESSURE: 137 MMHG | HEART RATE: 70 BPM

## 2025-06-13 LAB
ALBUMIN SERPL ELPH-MCNC: 3.7 G/DL — SIGNIFICANT CHANGE UP (ref 3.3–5.2)
ALP SERPL-CCNC: 76 U/L — SIGNIFICANT CHANGE UP (ref 40–120)
ALT FLD-CCNC: 20 U/L — SIGNIFICANT CHANGE UP
ANION GAP SERPL CALC-SCNC: 11 MMOL/L — SIGNIFICANT CHANGE UP (ref 5–17)
APPEARANCE UR: CLEAR — SIGNIFICANT CHANGE UP
AST SERPL-CCNC: 20 U/L — SIGNIFICANT CHANGE UP
BASOPHILS # BLD AUTO: 0.07 K/UL — SIGNIFICANT CHANGE UP (ref 0–0.2)
BASOPHILS NFR BLD AUTO: 1 % — SIGNIFICANT CHANGE UP (ref 0–2)
BILIRUB SERPL-MCNC: 0.2 MG/DL — LOW (ref 0.4–2)
BILIRUB UR-MCNC: NEGATIVE — SIGNIFICANT CHANGE UP
BUN SERPL-MCNC: 15.8 MG/DL — SIGNIFICANT CHANGE UP (ref 8–20)
CALCIUM SERPL-MCNC: 8.9 MG/DL — SIGNIFICANT CHANGE UP (ref 8.4–10.5)
CHLORIDE SERPL-SCNC: 98 MMOL/L — SIGNIFICANT CHANGE UP (ref 96–108)
CO2 SERPL-SCNC: 23 MMOL/L — SIGNIFICANT CHANGE UP (ref 22–29)
COLOR SPEC: YELLOW — SIGNIFICANT CHANGE UP
CREAT SERPL-MCNC: 0.52 MG/DL — SIGNIFICANT CHANGE UP (ref 0.5–1.3)
DIFF PNL FLD: NEGATIVE — SIGNIFICANT CHANGE UP
EGFR: 89 ML/MIN/1.73M2 — SIGNIFICANT CHANGE UP
EGFR: 89 ML/MIN/1.73M2 — SIGNIFICANT CHANGE UP
EOSINOPHIL # BLD AUTO: 0.03 K/UL — SIGNIFICANT CHANGE UP (ref 0–0.5)
EOSINOPHIL NFR BLD AUTO: 0.4 % — SIGNIFICANT CHANGE UP (ref 0–6)
GLUCOSE SERPL-MCNC: 94 MG/DL — SIGNIFICANT CHANGE UP (ref 70–99)
GLUCOSE UR QL: NEGATIVE MG/DL — SIGNIFICANT CHANGE UP
HCT VFR BLD CALC: 37.4 % — SIGNIFICANT CHANGE UP (ref 34.5–45)
HGB BLD-MCNC: 12.8 G/DL — SIGNIFICANT CHANGE UP (ref 11.5–15.5)
IMM GRANULOCYTES # BLD AUTO: 0.1 K/UL — HIGH (ref 0–0.07)
IMM GRANULOCYTES NFR BLD AUTO: 1.5 % — HIGH (ref 0–0.9)
KETONES UR QL: NEGATIVE MG/DL — SIGNIFICANT CHANGE UP
LEUKOCYTE ESTERASE UR-ACNC: NEGATIVE — SIGNIFICANT CHANGE UP
LIDOCAIN IGE QN: 31 U/L — SIGNIFICANT CHANGE UP (ref 22–51)
LYMPHOCYTES # BLD AUTO: 1 K/UL — SIGNIFICANT CHANGE UP (ref 1–3.3)
LYMPHOCYTES NFR BLD AUTO: 14.7 % — SIGNIFICANT CHANGE UP (ref 13–44)
MCHC RBC-ENTMCNC: 29.4 PG — SIGNIFICANT CHANGE UP (ref 27–34)
MCHC RBC-ENTMCNC: 34.2 G/DL — SIGNIFICANT CHANGE UP (ref 32–36)
MCV RBC AUTO: 86 FL — SIGNIFICANT CHANGE UP (ref 80–100)
MONOCYTES # BLD AUTO: 0.59 K/UL — SIGNIFICANT CHANGE UP (ref 0–0.9)
MONOCYTES NFR BLD AUTO: 8.7 % — SIGNIFICANT CHANGE UP (ref 2–14)
NEUTROPHILS # BLD AUTO: 5 K/UL — SIGNIFICANT CHANGE UP (ref 1.8–7.4)
NEUTROPHILS NFR BLD AUTO: 73.7 % — SIGNIFICANT CHANGE UP (ref 43–77)
NITRITE UR-MCNC: NEGATIVE — SIGNIFICANT CHANGE UP
NRBC # BLD AUTO: 0 K/UL — SIGNIFICANT CHANGE UP (ref 0–0)
NRBC # FLD: 0 K/UL — SIGNIFICANT CHANGE UP (ref 0–0)
NRBC BLD AUTO-RTO: 0 /100 WBCS — SIGNIFICANT CHANGE UP (ref 0–0)
PH UR: 7 — SIGNIFICANT CHANGE UP (ref 5–8)
PLATELET # BLD AUTO: 326 K/UL — SIGNIFICANT CHANGE UP (ref 150–400)
PMV BLD: 8.7 FL — SIGNIFICANT CHANGE UP (ref 7–13)
POTASSIUM SERPL-MCNC: 4.4 MMOL/L — SIGNIFICANT CHANGE UP (ref 3.5–5.3)
POTASSIUM SERPL-SCNC: 4.4 MMOL/L — SIGNIFICANT CHANGE UP (ref 3.5–5.3)
PROT SERPL-MCNC: 6.4 G/DL — LOW (ref 6.6–8.7)
PROT UR-MCNC: NEGATIVE MG/DL — SIGNIFICANT CHANGE UP
RBC # BLD: 4.35 M/UL — SIGNIFICANT CHANGE UP (ref 3.8–5.2)
RBC # FLD: 13.6 % — SIGNIFICANT CHANGE UP (ref 10.3–14.5)
SODIUM SERPL-SCNC: 132 MMOL/L — LOW (ref 135–145)
SP GR SPEC: 1.01 — SIGNIFICANT CHANGE UP (ref 1–1.03)
TROPONIN T, HIGH SENSITIVITY RESULT: 13 NG/L — SIGNIFICANT CHANGE UP (ref 0–51)
TSH SERPL-MCNC: 2.06 UIU/ML — SIGNIFICANT CHANGE UP (ref 0.27–4.2)
UROBILINOGEN FLD QL: 0.2 MG/DL — SIGNIFICANT CHANGE UP (ref 0.2–1)
WBC # BLD: 6.79 K/UL — SIGNIFICANT CHANGE UP (ref 3.8–10.5)
WBC # FLD AUTO: 6.79 K/UL — SIGNIFICANT CHANGE UP (ref 3.8–10.5)

## 2025-06-13 PROCEDURE — 99285 EMERGENCY DEPT VISIT HI MDM: CPT | Mod: 25

## 2025-06-13 PROCEDURE — 93005 ELECTROCARDIOGRAM TRACING: CPT

## 2025-06-13 PROCEDURE — 74177 CT ABD & PELVIS W/CONTRAST: CPT

## 2025-06-13 PROCEDURE — 99285 EMERGENCY DEPT VISIT HI MDM: CPT

## 2025-06-13 PROCEDURE — 82962 GLUCOSE BLOOD TEST: CPT

## 2025-06-13 PROCEDURE — 93010 ELECTROCARDIOGRAM REPORT: CPT

## 2025-06-13 PROCEDURE — 36415 COLL VENOUS BLD VENIPUNCTURE: CPT

## 2025-06-13 PROCEDURE — 74177 CT ABD & PELVIS W/CONTRAST: CPT | Mod: 26

## 2025-06-13 PROCEDURE — 80053 COMPREHEN METABOLIC PANEL: CPT

## 2025-06-13 PROCEDURE — 96374 THER/PROPH/DIAG INJ IV PUSH: CPT | Mod: XU

## 2025-06-13 PROCEDURE — 85025 COMPLETE CBC W/AUTO DIFF WBC: CPT

## 2025-06-13 PROCEDURE — 81003 URINALYSIS AUTO W/O SCOPE: CPT

## 2025-06-13 PROCEDURE — 36410 VNPNXR 3YR/> PHY/QHP DX/THER: CPT

## 2025-06-13 PROCEDURE — 84484 ASSAY OF TROPONIN QUANT: CPT

## 2025-06-13 PROCEDURE — 84443 ASSAY THYROID STIM HORMONE: CPT

## 2025-06-13 PROCEDURE — 83690 ASSAY OF LIPASE: CPT

## 2025-06-13 RX ORDER — MAGNESIUM, ALUMINUM HYDROXIDE 200-200 MG
30 TABLET,CHEWABLE ORAL ONCE
Refills: 0 | Status: COMPLETED | OUTPATIENT
Start: 2025-06-13 | End: 2025-06-13

## 2025-06-13 RX ORDER — ONDANSETRON HCL/PF 4 MG/2 ML
4 VIAL (ML) INJECTION ONCE
Refills: 0 | Status: ACTIVE | OUTPATIENT
Start: 2025-06-13

## 2025-06-13 RX ADMIN — Medication 30 MILLILITER(S): at 11:03

## 2025-06-13 RX ADMIN — Medication 1000 MILLILITER(S): at 11:03

## 2025-06-13 RX ADMIN — Medication 20 MILLIGRAM(S): at 11:03

## 2025-06-13 NOTE — ED ADULT NURSE NOTE - NSFALLHARMRISKINTERV_ED_ALL_ED
Assistance OOB with selected safe patient handling equipment if applicable/Assistance with ambulation/Communicate risk of Fall with Harm to all staff, patient, and family/Monitor gait and stability/Orthostatic vital signs/Provide patient with walking aids/Provide visual cue: red socks, yellow wristband, yellow gown, etc/Reinforce activity limits and safety measures with patient and family/Bed in lowest position, wheels locked, appropriate side rails in place/Call bell, personal items and telephone in reach/Instruct patient to call for assistance before getting out of bed/chair/stretcher/Non-slip footwear applied when patient is off stretcher/[place] to call system/Physically safe environment - no spills, clutter or unnecessary equipment/Purposeful Proactive Rounding/Room/bathroom lighting operational, light cord in reach

## 2025-06-13 NOTE — ED PROVIDER NOTE - ATTENDING CONTRIBUTION TO CARE
I personally saw the patient with the resident, and completed the key components of the history and physical exam. I then discussed the management plan with the resident.    89 y/o F with PMH HTN, HLD, asthma, GERD, IBS s/p partial vagotomy presents for an episode of nausea/vomiting/diarrhea and syncope 5 days ago, still feels "off." She notes similar symptoms in the past, feels dehydrated currently. Has appt with GI in 5 days.    I agree with exam as documented.    No events on tele, troponin flat, CT negative for acute pathology, patient improved with IV hydration, stable for discharge.

## 2025-06-13 NOTE — ED PROVIDER NOTE - CLINICAL SUMMARY MEDICAL DECISION MAKING FREE TEXT BOX
Patient is an 88yo F with PMHx of HTN, HLD, asthma, GERD, hiatal hernia, IBS s/p partial vagotomy and antrectomy vs pyloroplasty? (patient said "new hole made in my stomach") who presents to the ED complaining of an episode of nausea, vomiting, diarrhea and syncope on Sunday. EKG unchanged from prior. Will hydrate, check labs, and treat symptomatically.

## 2025-06-13 NOTE — ED PROVIDER NOTE - PROGRESS NOTE DETAILS
Feeling better, CT negative for acute pathology, labs grossly normal, trop neg x2, stable for discharge. Has appropriate follow up (is scheduled to see GI, cardiology, and neurology next week).

## 2025-06-13 NOTE — ED ADULT TRIAGE NOTE - AVIAN FLU SYMPTOMS
Onset: this morning    Location / description: This am awoke with body aches that was resolved with Ibuprofen. Developed fever of 101.6 at 1800 tonight.  Also states she was bitten by some insect last night on left pointer finger.  States center is black and filled with fluid. Advised to go to ED now.     Pain Scale (1 - 10), 10 highest: not very painful    What improves/worsens symptoms: nothing    Symptom specific medications: see list    LMP : Patient's last menstrual period was 10/03/2017.  Are you pregnant or breast feedin weeks PP, +breast feeding    Recent Care: 18  OBGYN    Reason for Disposition  • [1] Fever AND [2] red area    Protocols used: INSECT BITE-A-AH       No

## 2025-06-13 NOTE — ED ADULT TRIAGE NOTE - CHIEF COMPLAINT QUOTE
C/O abdominal pain, and vomiting since Sunday. Pt states she had an episode of syncope on Sunday. Pt states she has been dizzy since Sunday. Pt states she been trying to get an appointment with her gastroenterologist for symptoms. Hx of htn.

## 2025-06-13 NOTE — ED PROVIDER NOTE - NSFOLLOWUPINSTRUCTIONS_ED_ALL_ED_FT
- Follow up with your cardiologist, neurologist, and gastroenterologist.     Vasovagal syncope (say "ueu-bzd-[id number]-lexi [id number]")is sudden dizziness or fainting that can be set off by things such as pain, stress, fear, or trauma. You may sweat or feel light-headed, sick to your stomach, or tingly. The problem causes the heart rate to slow and the blood vessels to widen, or dilate, for a short time. When this happens, blood pools in the lower body, and less blood goes to the brain.    - Drink plenty of fluids to prevent dehydration. If you have kidney, heart, or liver disease and have to limit fluids, talk with your doctor before you increase your fluid intake.  - Try to avoid things that you think may set off vasovagal syncope.  - Talk to your doctor about any medicines you take. Some medicines may increase the chance of this condition occurring.  - If you feel symptoms, lie down with your legs raised. Talk to your doctor about what to do if your symptoms come back.  - Call 911 and/or go directly to the Emergency Room if you have chest pain or pressure, shortness of breath, a fast or irregular heart beat, or any other new or concerning symptoms.

## 2025-06-13 NOTE — ED ADULT NURSE NOTE - NSFALLDEVICES_ED_ALL_ED
Cane Ear Star Wedge Flap Text: The defect edges were debeveled with a #15 blade scalpel.  Given the location of the defect and the proximity to free margins (helical rim) an ear star wedge flap was deemed most appropriate.  Using a sterile surgical marker, the appropriate flap was drawn incorporating the defect and placing the expected incisions between the helical rim and antihelix where possible.  The area thus outlined was incised through and through with a #15 scalpel blade.

## 2025-06-13 NOTE — ED PROVIDER NOTE - PATIENT PORTAL LINK FT
You can access the FollowMyHealth Patient Portal offered by [facility] by registering at the following website: [email or web address]. By joining [employer]’s FollowMyHealth portal, you will also be able to view your health information using other applications (apps) compatible with our system.

## 2025-06-13 NOTE — ED PROVIDER NOTE - NS ED MD DISPO DISCHARGE
Home Cimzia Counseling:  I discussed with the patient the risks of Cimzia including but not limited to immunosuppression, allergic reactions and infections.  The patient understands that monitoring is required including a PPD at baseline and must alert us or the primary physician if symptoms of infection or other concerning signs are noted.

## 2025-06-13 NOTE — ED ADULT NURSE NOTE - OBJECTIVE STATEMENT
Pt is a 89y F, AOx4, c/o syncopal episode accompanied by diaphoresis, n/v/d, and dizziness. Pt accompanied by daughter. Pt states sx began around 0800 this morning, states that she has had about 9 episodes like this over the last several years. Pt has hx of benign brain mass, has MRI annually. Pt denies chest pain, SOB, abd pain, back pain, constipation and dysuria. Resp even and unlabored. Pt remains on tele monitor in NSR w/. Bed locked and in lowest position.

## 2025-06-13 NOTE — ED PROVIDER NOTE - OBJECTIVE STATEMENT
Patient is an 88yo F with PMHx of HTN, HLD, asthma, GERD, hiatal hernia, IBS s/p partial vagotomy and antrectomy vs pyloroplasty? (patient said "new hole made in my stomach") who presents to the ED complaining of an episode of nausea, vomiting, diarrhea and syncope on Sunday. States she has had 10 such episodes in the past year and follows with a gastroenterologist. Since then has felt generalized weakness, lightheadedness, and feels "washed out." Has apt with GI NP on Tuesday.

## 2025-06-16 ENCOUNTER — APPOINTMENT (OUTPATIENT)
Dept: GASTROENTEROLOGY | Facility: CLINIC | Age: 89
End: 2025-06-16
Payer: MEDICARE

## 2025-06-16 VITALS
RESPIRATION RATE: 14 BRPM | HEART RATE: 74 BPM | DIASTOLIC BLOOD PRESSURE: 96 MMHG | OXYGEN SATURATION: 97 % | TEMPERATURE: 97.2 F | WEIGHT: 166 LBS | SYSTOLIC BLOOD PRESSURE: 145 MMHG | HEIGHT: 62.5 IN | BODY MASS INDEX: 29.79 KG/M2

## 2025-06-16 PROCEDURE — 99214 OFFICE O/P EST MOD 30 MIN: CPT

## 2025-06-17 ENCOUNTER — NON-APPOINTMENT (OUTPATIENT)
Age: 89
End: 2025-06-17

## 2025-06-17 ENCOUNTER — OUTPATIENT (OUTPATIENT)
Dept: OUTPATIENT SERVICES | Facility: HOSPITAL | Age: 89
LOS: 1 days | End: 2025-06-17
Payer: MEDICARE

## 2025-06-17 ENCOUNTER — APPOINTMENT (OUTPATIENT)
Dept: MRI IMAGING | Facility: CLINIC | Age: 89
End: 2025-06-17
Payer: MEDICARE

## 2025-06-17 DIAGNOSIS — D32.9 BENIGN NEOPLASM OF MENINGES, UNSPECIFIED: ICD-10-CM

## 2025-06-17 PROCEDURE — A9585: CPT

## 2025-06-17 PROCEDURE — 70553 MRI BRAIN STEM W/O & W/DYE: CPT | Mod: 26

## 2025-06-17 PROCEDURE — 70553 MRI BRAIN STEM W/O & W/DYE: CPT

## 2025-06-21 ENCOUNTER — NON-APPOINTMENT (OUTPATIENT)
Age: 89
End: 2025-06-21

## 2025-06-23 ENCOUNTER — APPOINTMENT (OUTPATIENT)
Dept: CARDIOLOGY | Facility: CLINIC | Age: 89
End: 2025-06-23
Payer: MEDICARE

## 2025-06-23 PROCEDURE — 78452 HT MUSCLE IMAGE SPECT MULT: CPT

## 2025-06-23 PROCEDURE — 93015 CV STRESS TEST SUPVJ I&R: CPT

## 2025-06-23 PROCEDURE — A9502: CPT

## 2025-06-24 ENCOUNTER — APPOINTMENT (OUTPATIENT)
Dept: NEUROSURGERY | Facility: CLINIC | Age: 89
End: 2025-06-24

## 2025-06-24 VITALS
DIASTOLIC BLOOD PRESSURE: 84 MMHG | WEIGHT: 165 LBS | SYSTOLIC BLOOD PRESSURE: 147 MMHG | HEIGHT: 63 IN | OXYGEN SATURATION: 97 % | TEMPERATURE: 98.6 F | HEART RATE: 65 BPM | BODY MASS INDEX: 29.23 KG/M2

## 2025-06-24 PROCEDURE — 99214 OFFICE O/P EST MOD 30 MIN: CPT

## 2025-07-14 ENCOUNTER — APPOINTMENT (OUTPATIENT)
Dept: INTERNAL MEDICINE | Facility: CLINIC | Age: 89
End: 2025-07-14
Payer: MEDICARE

## 2025-07-14 VITALS
SYSTOLIC BLOOD PRESSURE: 124 MMHG | RESPIRATION RATE: 14 BRPM | HEART RATE: 74 BPM | BODY MASS INDEX: 30.12 KG/M2 | WEIGHT: 170 LBS | DIASTOLIC BLOOD PRESSURE: 75 MMHG | HEIGHT: 63 IN | OXYGEN SATURATION: 98 %

## 2025-07-14 PROBLEM — R05.9 COMPLAINING OF COUGH: Status: ACTIVE | Noted: 2025-07-14

## 2025-07-14 PROCEDURE — 99214 OFFICE O/P EST MOD 30 MIN: CPT

## 2025-07-14 PROCEDURE — 36415 COLL VENOUS BLD VENIPUNCTURE: CPT

## 2025-07-14 PROCEDURE — G2211 COMPLEX E/M VISIT ADD ON: CPT

## 2025-07-14 RX ORDER — METHYLPREDNISOLONE 4 MG/1
4 TABLET ORAL
Qty: 1 | Refills: 0 | Status: ACTIVE | COMMUNITY
Start: 2025-07-14 | End: 1900-01-01

## 2025-07-15 LAB
ALBUMIN SERPL ELPH-MCNC: 4.3 G/DL
ALP BLD-CCNC: 94 U/L
ALT SERPL-CCNC: 29 U/L
ANION GAP SERPL CALC-SCNC: 15 MMOL/L
AST SERPL-CCNC: 23 U/L
BASOPHILS # BLD AUTO: 0.09 K/UL
BASOPHILS NFR BLD AUTO: 0.9 %
BILIRUB SERPL-MCNC: 0.2 MG/DL
BUN SERPL-MCNC: 23 MG/DL
CALCIUM SERPL-MCNC: 9.5 MG/DL
CHLORIDE SERPL-SCNC: 100 MMOL/L
CHOLEST SERPL-MCNC: 189 MG/DL
CO2 SERPL-SCNC: 22 MMOL/L
CREAT SERPL-MCNC: 0.52 MG/DL
EGFRCR SERPLBLD CKD-EPI 2021: 89 ML/MIN/1.73M2
EOSINOPHIL # BLD AUTO: 0.07 K/UL
EOSINOPHIL NFR BLD AUTO: 0.7 %
ESTIMATED AVERAGE GLUCOSE: 117 MG/DL
GLUCOSE SERPL-MCNC: 105 MG/DL
HBA1C MFR BLD HPLC: 5.7 %
HCT VFR BLD CALC: 42 %
HDLC SERPL-MCNC: 64 MG/DL
HGB BLD-MCNC: 13 G/DL
IMM GRANULOCYTES NFR BLD AUTO: 2.2 %
LDLC SERPL-MCNC: 110 MG/DL
LYMPHOCYTES # BLD AUTO: 1.09 K/UL
LYMPHOCYTES NFR BLD AUTO: 10.5 %
MAN DIFF?: NORMAL
MCHC RBC-ENTMCNC: 28.5 PG
MCHC RBC-ENTMCNC: 31 G/DL
MCV RBC AUTO: 92.1 FL
MONOCYTES # BLD AUTO: 0.61 K/UL
MONOCYTES NFR BLD AUTO: 5.9 %
NEUTROPHILS # BLD AUTO: 8.33 K/UL
NEUTROPHILS NFR BLD AUTO: 79.8 %
NONHDLC SERPL-MCNC: 125 MG/DL
PLATELET # BLD AUTO: 390 K/UL
POTASSIUM SERPL-SCNC: 4.6 MMOL/L
PROT SERPL-MCNC: 7.1 G/DL
RBC # BLD: 4.56 M/UL
RBC # FLD: 14.3 %
SODIUM SERPL-SCNC: 137 MMOL/L
TRIGL SERPL-MCNC: 87 MG/DL
TSH SERPL-ACNC: 1.83 UIU/ML
URATE SERPL-MCNC: 3.5 MG/DL
WBC # FLD AUTO: 10.42 K/UL

## 2025-07-23 ENCOUNTER — NON-APPOINTMENT (OUTPATIENT)
Age: 89
End: 2025-07-23

## 2025-07-23 ENCOUNTER — OFFICE (OUTPATIENT)
Dept: URBAN - METROPOLITAN AREA CLINIC 115 | Facility: CLINIC | Age: OVER 89
Setting detail: OPHTHALMOLOGY
End: 2025-07-23
Payer: MEDICARE

## 2025-07-23 DIAGNOSIS — Z96.1: ICD-10-CM

## 2025-07-23 DIAGNOSIS — H35.033: ICD-10-CM

## 2025-07-23 DIAGNOSIS — H01.004: ICD-10-CM

## 2025-07-23 DIAGNOSIS — H35.431: ICD-10-CM

## 2025-07-23 DIAGNOSIS — H40.013: ICD-10-CM

## 2025-07-23 DIAGNOSIS — H01.001: ICD-10-CM

## 2025-07-23 DIAGNOSIS — H43.813: ICD-10-CM

## 2025-07-23 DIAGNOSIS — H35.3121: ICD-10-CM

## 2025-07-23 PROCEDURE — 92133 CPTRZD OPH DX IMG PST SGM ON: CPT | Performed by: OPHTHALMOLOGY

## 2025-07-23 PROCEDURE — 92014 COMPRE OPH EXAM EST PT 1/>: CPT | Performed by: OPHTHALMOLOGY

## 2025-07-23 ASSESSMENT — REFRACTION_CURRENTRX
OD_ADD: +2.50
OD_SPHERE: -1.00
OD_VPRISM_DIRECTION: BF
OD_VPRISM_DIRECTION: BF
OD_OVR_VA: 20/
OD_CYLINDER: -1.00
OS_OVR_VA: 20/
OS_CYLINDER: -0.50
OS_SPHERE: -1.25
OD_AXIS: 099
OS_VPRISM_DIRECTION: BF
OD_AXIS: 105
OS_ADD: +2.75
OD_SPHERE: -0.75
OS_ADD: +2.50
OD_ADD: +2.75
OD_CYLINDER: -1.25
OS_SPHERE: -1.25
OS_VPRISM_DIRECTION: BF
OS_OVR_VA: 20/
OD_OVR_VA: 20/
OS_CYLINDER: -0.50
OS_AXIS: 080
OS_AXIS: 086

## 2025-07-23 ASSESSMENT — VISUAL ACUITY
OS_BCVA: 20/25
OD_BCVA: 20/20

## 2025-07-23 ASSESSMENT — KERATOMETRY: METHOD_AUTO_MANUAL: MANUAL

## 2025-07-23 ASSESSMENT — REFRACTION_MANIFEST
OS_CYLINDER: -0.50
OD_VA1: 20/25
OD_ADD: +2.50
OS_SPHERE: -1.25
OS_ADD: +2.50
OS_AXIS: 075
OS_VA1: 20/20
OD_AXIS: 105
OD_SPHERE: -1.00
OD_CYLINDER: -1.00

## 2025-07-23 ASSESSMENT — REFRACTION_AUTOREFRACTION
OS_AXIS: 091
OS_CYLINDER: -1.25
OD_SPHERE: -0.50
OD_AXIS: 121
OS_SPHERE: -0.75
OD_CYLINDER: -1.75

## 2025-07-23 ASSESSMENT — CONFRONTATIONAL VISUAL FIELD TEST (CVF)
OS_FINDINGS: FULL
OD_FINDINGS: FULL

## 2025-07-23 ASSESSMENT — TONOMETRY
OS_IOP_MMHG: 21
OD_IOP_MMHG: 20

## 2025-07-23 ASSESSMENT — LID EXAM ASSESSMENTS
OD_BLEPHARITIS: RUL T
OS_BLEPHARITIS: LUL T

## 2025-07-23 ASSESSMENT — PACHYMETRY
OS_CT_CORRECTION: -4
OD_CT_UM: 599
OD_CT_CORRECTION: -4
OS_CT_UM: 590

## 2025-07-24 DIAGNOSIS — M79.641 PAIN IN RIGHT HAND: ICD-10-CM

## 2025-07-25 ENCOUNTER — APPOINTMENT (OUTPATIENT)
Dept: ORTHOPEDIC SURGERY | Facility: CLINIC | Age: 89
End: 2025-07-25
Payer: MEDICARE

## 2025-07-25 VITALS — BODY MASS INDEX: 30.12 KG/M2 | HEIGHT: 63 IN | WEIGHT: 170 LBS

## 2025-07-25 VITALS
SYSTOLIC BLOOD PRESSURE: 139 MMHG | WEIGHT: 170 LBS | BODY MASS INDEX: 30.12 KG/M2 | HEIGHT: 63 IN | DIASTOLIC BLOOD PRESSURE: 76 MMHG

## 2025-07-25 DIAGNOSIS — M15.1 HEBERDEN'S NODES (WITH ARTHROPATHY): ICD-10-CM

## 2025-07-25 DIAGNOSIS — M79.644 PAIN IN RIGHT FINGER(S): ICD-10-CM

## 2025-07-25 DIAGNOSIS — M79.89 OTHER SPECIFIED SOFT TISSUE DISORDERS: ICD-10-CM

## 2025-07-25 DIAGNOSIS — M65.341 TRIGGER FINGER, RIGHT RING FINGER: ICD-10-CM

## 2025-07-25 PROCEDURE — 20600 DRAIN/INJ JOINT/BURSA W/O US: CPT | Mod: RT

## 2025-07-25 PROCEDURE — 73130 X-RAY EXAM OF HAND: CPT | Mod: 50

## 2025-07-25 PROCEDURE — 20550 NJX 1 TENDON SHEATH/LIGAMENT: CPT | Mod: 59,RT

## 2025-07-25 PROCEDURE — 99204 OFFICE O/P NEW MOD 45 MIN: CPT | Mod: 25

## 2025-07-31 ENCOUNTER — RX RENEWAL (OUTPATIENT)
Age: 89
End: 2025-07-31

## 2025-08-04 ENCOUNTER — RX RENEWAL (OUTPATIENT)
Age: 89
End: 2025-08-04

## 2025-08-21 ENCOUNTER — APPOINTMENT (OUTPATIENT)
Dept: GASTROENTEROLOGY | Facility: CLINIC | Age: 89
End: 2025-08-21

## 2025-08-26 ENCOUNTER — APPOINTMENT (OUTPATIENT)
Dept: ORTHOPEDIC SURGERY | Facility: CLINIC | Age: 89
End: 2025-08-26
Payer: MEDICARE

## 2025-08-26 DIAGNOSIS — M25.462 EFFUSION, RIGHT KNEE: ICD-10-CM

## 2025-08-26 DIAGNOSIS — M17.0 BILATERAL PRIMARY OSTEOARTHRITIS OF KNEE: ICD-10-CM

## 2025-08-26 DIAGNOSIS — M25.461 EFFUSION, RIGHT KNEE: ICD-10-CM

## 2025-08-26 PROCEDURE — 99214 OFFICE O/P EST MOD 30 MIN: CPT | Mod: 25

## 2025-08-26 PROCEDURE — 20610 DRAIN/INJ JOINT/BURSA W/O US: CPT | Mod: 50

## 2025-09-05 ENCOUNTER — APPOINTMENT (OUTPATIENT)
Dept: GASTROENTEROLOGY | Facility: CLINIC | Age: 89
End: 2025-09-05

## 2025-09-05 VITALS
RESPIRATION RATE: 14 BRPM | OXYGEN SATURATION: 98 % | SYSTOLIC BLOOD PRESSURE: 142 MMHG | DIASTOLIC BLOOD PRESSURE: 90 MMHG | HEART RATE: 72 BPM | HEIGHT: 63 IN | TEMPERATURE: 97.3 F | WEIGHT: 168 LBS | BODY MASS INDEX: 29.77 KG/M2

## 2025-09-05 DIAGNOSIS — Z71.9 COUNSELING, UNSPECIFIED: ICD-10-CM

## 2025-09-05 PROCEDURE — G2211 COMPLEX E/M VISIT ADD ON: CPT

## 2025-09-05 PROCEDURE — 99213 OFFICE O/P EST LOW 20 MIN: CPT

## 2025-09-06 ENCOUNTER — RX RENEWAL (OUTPATIENT)
Age: 89
End: 2025-09-06

## (undated) DEVICE — POLY TRAP ETRAP

## (undated) DEVICE — STERIS DEFENDO 3-PIECE KIT (AIR/WATER, SUCTION & BIOPSY VALVES)

## (undated) DEVICE — FORCEP RADIAL JAW 4 240CM DISP